# Patient Record
Sex: FEMALE | Race: BLACK OR AFRICAN AMERICAN
[De-identification: names, ages, dates, MRNs, and addresses within clinical notes are randomized per-mention and may not be internally consistent; named-entity substitution may affect disease eponyms.]

---

## 2017-01-17 ENCOUNTER — HOSPITAL ENCOUNTER (OUTPATIENT)
Dept: HOSPITAL 58 - ED | Age: 72
Setting detail: OBSERVATION
LOS: 1 days | Discharge: HOME | End: 2017-01-18
Attending: INTERNAL MEDICINE | Admitting: INTERNAL MEDICINE

## 2017-01-17 VITALS — BODY MASS INDEX: 31 KG/M2

## 2017-01-17 DIAGNOSIS — I10: ICD-10-CM

## 2017-01-17 DIAGNOSIS — I49.3: Primary | ICD-10-CM

## 2017-01-17 DIAGNOSIS — R06.02: ICD-10-CM

## 2017-01-17 DIAGNOSIS — E87.6: ICD-10-CM

## 2017-01-17 DIAGNOSIS — I42.0: ICD-10-CM

## 2017-01-17 DIAGNOSIS — J44.9: ICD-10-CM

## 2017-01-17 DIAGNOSIS — Z95.0: ICD-10-CM

## 2017-01-17 DIAGNOSIS — Z79.899: ICD-10-CM

## 2017-01-17 DIAGNOSIS — Z95.810: ICD-10-CM

## 2017-01-17 DIAGNOSIS — Z79.01: ICD-10-CM

## 2017-01-17 DIAGNOSIS — I25.10: ICD-10-CM

## 2017-01-17 DIAGNOSIS — I50.9: ICD-10-CM

## 2017-01-17 DIAGNOSIS — R06.09: ICD-10-CM

## 2017-01-17 LAB
ALBUMIN SERPL-MCNC: 4.1 G/DL (ref 3.4–5)
ALBUMIN/GLOB SERPL: 1.05 {RATIO}
ALP SERPL-CCNC: 62 U/L (ref 53–141)
ALT SERPL-CCNC: 19 U/L (ref 12–78)
ANION GAP SERPL CALC-SCNC: 16.4 MMOL/L
AST SERPL-CCNC: 25 U/L (ref 15–37)
BASOPHILS # BLD AUTO: 0 K/UL (ref 0–0.2)
BASOPHILS NFR BLD AUTO: 0.3 % (ref 0–3)
BILIRUB SERPL-MCNC: 1.01 MG/DL (ref 0–1.2)
BUN SERPL-MCNC: 16 MG/DL (ref 7–18)
BUN/CREAT SERPL: 17.97
CALCIUM SERPL-MCNC: 9.5 MG/DL (ref 8.2–10.2)
CHLORIDE SERPL-SCNC: 103 MMOL/L (ref 98–107)
CK SERPL-CCNC: 60 U/L
CO2 BLD-SCNC: 26 MMOL/L (ref 23–31)
CREAT SERPL-MCNC: 0.89 MG/DL (ref 0.6–1.3)
EOSINOPHIL # BLD AUTO: 0.1 K/UL (ref 0–0.7)
EOSINOPHIL NFR BLD AUTO: 0.7 % (ref 0–7)
GFR SERPLBLD BASED ON 1.73 SQ M-ARVRAT: 76 ML/MIN
GLOBULIN SER CALC-MCNC: 3.9 G/L
GLUCOSE SERPL-MCNC: 105 MG/DL (ref 82–115)
HCT VFR BLD AUTO: 35.9 % (ref 37–47)
HGB BLD-MCNC: 12.2 G/DL (ref 12–16)
IMM GRANULOCYTES NFR BLD AUTO: 0.3 % (ref 0–5)
LYMPHOCYTES # SPEC AUTO: 1 K/UL (ref 0.6–3.4)
LYMPHOCYTES NFR BLD AUTO: 9.9 % (ref 10–50)
MAGNESIUM SERPL-MCNC: 2.2 MG/DL (ref 1.7–2.2)
MCH RBC QN: 28.2 PG (ref 27–31)
MCHC RBC AUTO-ENTMCNC: 34 G/DL (ref 31.8–35.4)
MCV RBC: 82.9 FL (ref 81–99)
MONOCYTES # BLD AUTO: 0.4 K/UL (ref 0.4–2)
MONOCYTES NFR BLD AUTO: 3.9 % (ref 0–10)
NEUTROPHILS # BLD AUTO: 8.6 K/UL (ref 2–6.9)
NEUTROPHILS NFR BLD AUTO: 84.9 %
PLATELET # BLD AUTO: 259 10^3/UL (ref 140–440)
POTASSIUM SERPL-SCNC: 3.4 MMOL/L (ref 3.5–5.1)
PROT SERPL-MCNC: 8 G/DL (ref 5.8–8.1)
RBC # BLD AUTO: 4.33 10^6/UL (ref 4.2–5.4)
SODIUM SERPL-SCNC: 142 MMOL/L (ref 136–145)
TROPONIN I SERPL-MCNC: 0.07 NG/ML (ref 0–0.4)
WBC # BLD AUTO: 10.13 K/UL (ref 4.6–10.2)

## 2017-01-17 PROCEDURE — 96361 HYDRATE IV INFUSION ADD-ON: CPT

## 2017-01-17 PROCEDURE — 99284 EMERGENCY DEPT VISIT MOD MDM: CPT

## 2017-01-17 PROCEDURE — 93010 ELECTROCARDIOGRAM REPORT: CPT

## 2017-01-17 PROCEDURE — 36415 COLL VENOUS BLD VENIPUNCTURE: CPT

## 2017-01-17 PROCEDURE — 84484 ASSAY OF TROPONIN QUANT: CPT

## 2017-01-17 PROCEDURE — 80053 COMPREHEN METABOLIC PANEL: CPT

## 2017-01-17 PROCEDURE — 97802 MEDICAL NUTRITION INDIV IN: CPT

## 2017-01-17 PROCEDURE — 96372 THER/PROPH/DIAG INJ SC/IM: CPT

## 2017-01-17 PROCEDURE — 83880 ASSAY OF NATRIURETIC PEPTIDE: CPT

## 2017-01-17 PROCEDURE — 80048 BASIC METABOLIC PNL TOTAL CA: CPT

## 2017-01-17 PROCEDURE — 83735 ASSAY OF MAGNESIUM: CPT

## 2017-01-17 PROCEDURE — 82550 ASSAY OF CK (CPK): CPT

## 2017-01-17 PROCEDURE — 93005 ELECTROCARDIOGRAM TRACING: CPT

## 2017-01-17 PROCEDURE — 84132 ASSAY OF SERUM POTASSIUM: CPT

## 2017-01-17 PROCEDURE — 85025 COMPLETE CBC W/AUTO DIFF WBC: CPT

## 2017-01-17 PROCEDURE — 85379 FIBRIN DEGRADATION QUANT: CPT

## 2017-01-17 NOTE — ED.PDOC
General


ED Provider: 


Dr. ZENIA VOGT





Chief Complaint: Respiratory Complaint


Stated Complaint: Patient is a 71 year old femal who has a Pacemaker/ 

difibrilaltor who states that she  having some wheezing 3 days ago with some 

nausea and diaphoresis. She also  received a call from [place] today and was 

told her defibrilator fired on Saturday at 4 in the morning. when she was 

having these symptoms.  Pt stated she feels SOA when she walks, but no other 

symptoms.


Time Seen by Physician: 19:06


Mode of Arrival: Walk-In


Information Source: Patient


Exam Limitations: No limitations


Primary Care Provider: 


MARY WANG





Nursing and Triage Documentation Reviewed and Agree: Yes





Cardiovascular Complaint Exam





- Chest Pain Complaint/Exam


Onset: Sudden


Duration: 1 hour 


Symptoms Are: Resolved


Timing: Intermittent


Associated Signs and Symptoms: Reports: Diaphoresis, Nausea, Short of air (with 

waking occationally ).  Denies: Vomiting, Fever, Palpitations, Cough


Related Surgical History: Reports: Pacemaker


AMI/ACS Risk Factors: Reports: Hypertension


TAD Risk Factors: Reports: Hypertension


Prior Care for this Complaint: No


Recent Stress Test: No


Recent Echo/LV Function: No


JVD Present: No


Subcutaneous Emphysema Present: No


Diminshed Breath Sounds: No


Reproducible Chest Wall Pain: No


Bilateral Pulses Present: No


Unequal Pulses Noted: No


If Risk Factors for AMI/ACS Consider: EKG, Cardiac Enzymes


Differential Diagnoses: Other


Quality Indicator For Non-Traumatic Chest Pain/Syncope: EKG Performed





Review of Systems





- Review Of Systems


Constitutional: Reports: No symptoms


Eyes: Reports: No symptoms


Ears, Nose, Mouth, Throat: Reports: No symptoms


Respiratory: Reports: Short of air, Wheezing


Cardiac: Reports: No symptoms


GI: Reports: Nausea (3 days ago )


: Reports: No symptoms


Musculoskeletal: Reports: No symptoms


Skin: Reports: No symptoms


Neurological: Reports: No symptoms


Endocrine: Reports: No symptoms, Other (diaphroresis on 3 days ago )


Hematologic/Lymphatic: Reports: No symptoms


All Other Systems: Reviewed and Negative





Past Medical History





- Past Medical History


Endocrine: Reports: None


Cardiovascular: Reports: Hypertension


Respiratory: Reports: None


Hematological: Reports: Anemia


Gastrointestinal: Reports: GERD


Genitourinary: Reports: None


Neuro/Psych: Reports: TIA


Musculoskeletal: Reports: Arthritis


Cancer: Reports: None


Last Menstrual Period: N/A





- Surgical History


General Surgical History: Reports: Pacemaker (defibrilltor. )





- Family History


Family History: Reports: Heart





- Social History


Smoking Status: Former smoker


Hx Substance Use: No


Alcohol Screening: None





- Immunizations


Tetanus Shot up to Date: Yes





Physical Exam





- Physical Exam


Appearance: Well-appearing, No pain distress, Well-nourished


Eyes: KAYLA, EOMI, Conjunctiva clear


ENT: Oropharynx normal


Neck: Supple


Respiratory: Airway patent, Breath sounds clear, Breath sounds equal, 

Respirations nonlabored


Cardiovascular: RRR, Pulses normal, No rub, No murmur


GI/: Soft, Nontender, No masses, Bowel sounds normal, No Organomegaly


Musculoskeletal: Normal strength, ROM intact, No edema, No calf tenderness


Skin: Warm, Dry, Normal color


Neurological: Sensation intact, Motor intact, Reflexes intact, Cranial nerves 

intact, Alert, Oriented


Psychiatric: Affect appropriate, Mood appropriate





Interpretation





- Cardiac Monitor


Time of Cardiac Monitor Interpretation: 19:00


Ectopy: PVCs





- EKG Interpretation


Time of EKG #1: 19:16


Rate: Normal


Rhythm: Sinus


Ectopy: PVCs


Axis: Left


Interpretation: Multiple PVCS. 





Physician Notification





- Case Discussed


Physician Notified: Ana Rosa


Time of Notification: 21:15 (Ok to admits for observation. )





Critical Care Note





- Critical Care Note


Total Time (mins): 15





Course





- Course


Hematology/Chemistry: 


 01/17/17 19:14





 01/17/17 19:14


Orders, Labs, Meds: 


Lab Review











  01/17/17 01/17/17





  19:14 19:17


 


WBC  10.13 


 


RBC  4.33 


 


Hgb  12.2 


 


Hct  35.9 L 


 


MCV  82.9 


 


MCH  28.2 


 


MCHC  34.0 


 


RDW Coeff of Nori  15.6 H 


 


Plt Count  259 


 


Immature Gran % (Auto)  0.3 


 


Neut % (Auto)  84.9 


 


Lymph % (Auto)  9.9 L 


 


Mono % (Auto)  3.9 


 


Eos % (Auto)  0.7 


 


Baso % (Auto)  0.3 


 


Immature Gran # (Auto)  0.0 


 


Neut #  8.6 H 


 


Lymph #  1.0 


 


Mono #  0.4 


 


Eos #  0.1 


 


Baso #  0.0 


 


D-Dimer   0.72


 


Sodium  142 


 


Potassium  3.4 L 


 


Chloride  103 


 


Carbon Dioxide  26 


 


Anion Gap  16.4 


 


BUN  16 


 


Creatinine  0.89 


 


Estimated GFR (MDRD)  76.00 


 


BUN/Creatinine Ratio  17.97 


 


Glucose  105 


 


Calcium  9.5 


 


Magnesium  2.2 


 


Total Bilirubin  1.01 


 


AST  25 


 


ALT  19 


 


Alkaline Phosphatase  62 


 


Total Creatine Kinase  60 


 


Troponin I  0.0740 


 


B-Natriuretic Peptide  1452 H 


 


Total Protein  8.0 


 


Albumin  4.1 


 


Globulin  3.9 


 


Albumin/Globulin Ratio  1.05 








Orders











 Category Date Time Status


 


 PLACE PATIENT AS OBSERVATION .TO MEDSURG (MONITORED BED ADMISSION  01/17/17 21:

21 Active





 )   


 


 EKG-(ED ONLY) Stat CARDIO  01/17/17 19:06 Completed


 


 EKG-(IP & OP ONLY) Routine CARDIO  01/18/17 06:00 Ordered


 


 ACTIVITY .Up ad Abbie CARE  01/17/17 21:23 Active


 


 INTAKE & OUTPUT Q8HR CARE  01/17/17 21:22 Active


 


 TELEMETRY MONITORING TELE CARE  01/17/17 21:23 Active


 


 VITAL SIGNS Q8HR CARE  01/17/17 21:23 Active


 


 CARDIAC DIET DIETARY  01/17/17 Breakfast Ordered


 


 BASIC METABOLIC PANEL DAILY@0600 LAB  01/18/17 06:00 Ordered


 


 BASIC METABOLIC PANEL DAILY@0600 LAB  01/19/17 06:00 Ordered


 


 BASIC METABOLIC PANEL DAILY@0600 LAB  01/20/17 06:00 Ordered


 


 BASIC METABOLIC PANEL DAILY@0600 LAB  01/21/17 06:00 Ordered


 


 BASIC METABOLIC PANEL DAILY@0600 LAB  01/22/17 06:00 Ordered


 


 BASIC METABOLIC PANEL DAILY@0600 LAB  01/23/17 06:00 Ordered


 


 BASIC METABOLIC PANEL DAILY@0600 LAB  01/24/17 06:00 Ordered


 


 BASIC METABOLIC PANEL DAILY@0600 LAB  01/25/17 06:00 Ordered


 


 BASIC METABOLIC PANEL DAILY@0600 LAB  01/26/17 06:00 Ordered


 


 BASIC METABOLIC PANEL DAILY@0600 LAB  01/27/17 06:00 Ordered


 


 BASIC METABOLIC PANEL DAILY@0600 LAB  01/28/17 06:00 Ordered


 


 BASIC METABOLIC PANEL DAILY@0600 LAB  01/29/17 06:00 Ordered


 


 BASIC METABOLIC PANEL DAILY@0600 LAB  01/30/17 06:00 Ordered


 


 BASIC METABOLIC PANEL DAILY@0600 LAB  01/31/17 06:00 Ordered


 


 BASIC METABOLIC PANEL DAILY@0600 LAB  02/01/17 06:00 Ordered


 


 BASIC METABOLIC PANEL DAILY@0600 LAB  02/02/17 06:00 Ordered


 


 BASIC METABOLIC PANEL DAILY@0600 LAB  02/03/17 06:00 Ordered


 


 BASIC METABOLIC PANEL DAILY@0600 LAB  02/04/17 06:00 Ordered


 


 BASIC METABOLIC PANEL DAILY@0600 LAB  02/05/17 06:00 Ordered


 


 BASIC METABOLIC PANEL DAILY@0600 LAB  02/06/17 06:00 Ordered


 


 BNP [B-TYPE NATRIURETIC PEPTIDE] Stat LAB  01/17/17 19:14 Completed


 


 CBC W/ AUTO DIFF Stat LAB  01/17/17 19:14 Completed


 


 COMPREHENSIVE METABOLIC PANEL Stat LAB  01/17/17 19:14 Completed


 


 CREATINE KINASE Stat LAB  01/17/17 19:14 Completed


 


 D-DIMER Stat LAB  01/17/17 19:17 Completed


 


 MAGNESIUM Stat LAB  01/17/17 19:14 Completed


 


 TROPONIN I Stat LAB  01/17/17 19:14 Completed


 


 Albuterol Sulfate [Proair Hfa] MEDS  01/17/17 21:26 Ordered





 2 puff IH Q4-6H PRN   


 


 Clopidogrel Bisulfate [Plavix] MEDS  01/18/17 09:00 Ordered





 75 mg PO DAILY   


 


 Enoxaparin Sodium [Lovenox] MEDS  01/18/17 09:00 Ordered





 40 mg SUBCUT DAILY   


 


 Furosemide [Lasix Tab] MEDS  01/18/17 09:00 Ordered





 20 mg PO BID   


 


 L.acidoph,Paracasei, B.lactis [Probiotic] MEDS  01/18/17 09:00 Ordered





 1 cap PO DAILY   


 


 Metolazone [Zaroxolyn] MEDS  01/17/17 21:26 Ordered





 2.5 mg PO DAILY PRN   


 


 Multivits-Min/Iron/FA/Lutein [Centrum Silver Women MEDS  01/18/17 09:00 Ordered





 Tablet]   





 1 tab PO DAILY   


 


 Omega-3 Fatty Acids/Fish Oil [Fish Oil 1,000 mg Capsule MEDS  01/18/17 09:00 

Ordered





 ]   





 1 each PO DAILY   


 


 Ondansetron HCl/Pf [Zofran 4 mg/2 ml] MEDS  01/17/17 21:21 Ordered





 4 mg IVP Q6H PRN   


 


 Potassium Chloride [Potassium Chloride Premix Run] 20 MEDS  01/17/17 21:25 

Active





 meq   





 Premix 100 ml Water 1 bag   





 IV ONCE   


 


 Potassium Chloride [Potassium Chloride] MEDS  01/18/17 09:00 Ordered





 20 meq PO DAILY   


 


 Pravastatin Sodium [Pravachol] MEDS  01/18/17 09:00 Ordered





 20 mg PO DAILY   


 


 Prednisone MEDS  01/19/17 09:00 Ordered





 2.5 mg PO EVERY OTHER DAY   


 


 Sotalol HCl [Betapace] MEDS  01/18/17 09:00 Ordered





 40 mg PO BID   


 


 RESUSCITATION STATUS Routine OTHERS  01/17/17 21:21 Ordered


 


 CHEST, 2 VIEWS PA & LAT Stat RADS  01/17/17 20:11 Taken








Medications











Generic Name Dose Route Start Last Admin





  Trade Name Freq  PRN Reason Stop Dose Admin


 


Albuterol Sulfate  2 puff  01/17/17 21:26  





  Proair Hfa  IH   





  Q4-6H PRN   





  Shortness of breath    


 


Clopidogrel Bisulfate  75 mg  01/18/17 09:00  





  Plavix  PO   





  DAILY [facility]   


 


Enoxaparin Sodium  40 mg  01/18/17 09:00  





  Lovenox  SUBCUT   





  DAILY ANNIE   


 


Furosemide  20 mg  01/18/17 09:00  





  Lasix Tab  PO   





  BID ANNIE   


 


Potassium Chloride 20 meq/  100 mls @ 50 mls/hr  01/17/17 21:25  





  Sterile Water  IV  01/17/17 23:24  





  ONCE STA   


 


Metolazone  2.5 mg  01/17/17 21:26  





  Zaroxolyn  PO   





  DAILY PRN   





  Edema   


 


Non-Formulary Medication  1 cap  01/18/17 09:00  





  L.Acidoph,Paracasei, B.Lactis [Probiotic]  PO   





  DAILY ANNIE   


 


Non-Formulary Medication  1 tab  01/18/17 09:00  





  Multivits-Min/Iron/Fa/Lutein [Centrum Silver Women Tablet]  PO   





  DAILY ANNIE   


 


Non-Formulary Medication  1 each  01/18/17 09:00  





  Omega-3 Fatty Acids/Fish Oil [Fish Oil 1,000 Mg Capsule]  PO   





  DAILY ANNIE   


 


Non-Formulary Medication  20 meq  01/18/17 09:00  





  Potassium Chloride [Potassium Chloride]  PO   





  DAILY ANNIE   


 


Ondansetron HCl  4 mg  01/17/17 21:21  





  Zofran 4 Mg/2 Ml  IVP   





  Q6H PRN   





  Nausea / Vomiting   


 


Pravastatin Sodium  20 mg  01/18/17 09:00  





  Pravachol  PO   





  DAILY ANNIE   


 


Prednisone  2.5 mg  01/19/17 09:00  





  Prednisone  PO   





  EVERY OTHER DAY ANNIE   


 


Sotalol HCl  40 mg  01/18/17 09:00  





  Betapace  PO   





  BID [facility]   











Vital Signs: 


 











  Temp Pulse Resp BP Pulse Ox


 


 01/17/17 18:31  97.4 F L  81  20  116/71  94 L














GENNY Risk Score


GENNY Risk Score: 


Risk Score      Odds of death by 30D


      0                 0.1 (0.1-0.2)


      1                 0.3 (0.2-0.3)


      2                 0.4 (0.3-0.5)


      3                 0.7 (0.6-0.9)


      4                 1.2 (1.0-1.5)


      5                 2.2 (1.9-2.6)


      6                 3.0 (2.5-3.6)


      7                 4.8 (3.8-6.1)








Departure





- Departure


Time of Disposition: 21:31


Disposition: PLACED AS OBSERVATION


Discharge Problem: 


 Hypokalemia





Dyspnea


Qualifiers:


 Dyspnea type: dyspnea on exertion Qualifier Code: (R06.09) Other forms of 

dyspnea





Condition: Fair


Pt referred to PMD for follow-up: No (Admitted )


Allergies/Adverse Reactions: 


Allergies





aspirin Adverse Reaction (Verified 10/11/16 00:34)


 


cholecalciferol (vitamin D3) [From Vitamin D3] Adverse Reaction (Verified 10/11/

16 00:34)


 


ciprofloxacin Adverse Reaction (Verified 10/11/16 00:34)


 


codeine Adverse Reaction (Verified 10/11/16 00:34)


 


dexamethasone [From Decadron] Adverse Reaction (Verified 10/11/16 00:34)


 


dexamethasone sod phosphate [From Decadron] Adverse Reaction (Verified 10/11/16 

00:34)


 


ergocalciferol (vitamin D2) [From Vitamin D2] Adverse Reaction (Verified 10/11/

16 00:34)


 constipation


fluticasone propionate [From Advair Diskus] Adverse Reaction (Verified 10/11/16 

00:34)


 


hydrochlorothiazide Adverse Reaction (Verified 10/11/16 00:34)


 angioedema


salmeterol xinafoate [From Advair Diskus] Adverse Reaction (Verified 10/11/16 00

:34)


 


spironolactone Adverse Reaction (Verified 10/11/16 00:34)


 


Sulfa (Sulfonamide Antibiotics) Adverse Reaction (Verified 10/11/16 00:34)


 








Home Medications: 


Ambulatory Orders





Clopidogrel Bisulfate [Plavix] 75 mg PO DAILY 09/01/15 


Furosemide [Lasix] 20 mg PO BID 09/01/15 


Omega-3 Fatty Acids/Fish Oil [Fish Oil 1,000 mg Capsule] 1 each PO DAILY 09/01/

15 


Pravastatin Sodium [Pravachol] 20 mg PO DAILY 09/01/15 


Sotalol HCl [Sotalol] 40 mg PO BID 09/01/15 


Metolazone 2.5 mg PO DAILY PRN 10/11/16 


Prednisone 2.5 mg PO EVERY OTHER DAY 10/11/16 


Albuterol Sulfate [Proair Hfa] 2 puff INH Q4-6H PRN 01/17/17 


L.acidoph,Paracasei, B.lactis [Probiotic] 1 cap PO DAILY 01/17/17 


Multivits-Min/Iron/FA/Lutein [Centrum Silver Women Tablet] 1 tab PO DAILY 01/17/ 17 


Potassium Chloride 10 meq PO DAILY 01/17/17 








Disposition Discussed With: Patient, Family

## 2017-01-18 VITALS — SYSTOLIC BLOOD PRESSURE: 110 MMHG | DIASTOLIC BLOOD PRESSURE: 65 MMHG | TEMPERATURE: 98.3 F

## 2017-01-18 LAB
ANION GAP SERPL CALC-SCNC: 12.2 MMOL/L
BUN SERPL-MCNC: 17 MG/DL (ref 7–18)
BUN/CREAT SERPL: 17.89
CALCIUM SERPL-MCNC: 9 MG/DL (ref 8.2–10.2)
CHLORIDE SERPL-SCNC: 103 MMOL/L (ref 98–107)
CO2 BLD-SCNC: 29 MMOL/L (ref 23–31)
CREAT SERPL-MCNC: 0.95 MG/DL (ref 0.6–1.3)
GFR SERPLBLD BASED ON 1.73 SQ M-ARVRAT: 70 ML/MIN
GLUCOSE SERPL-MCNC: 108 MG/DL (ref 82–115)
POTASSIUM SERPL-SCNC: 3.2 MMOL/L (ref 3.5–5.1)
SODIUM SERPL-SCNC: 141 MMOL/L (ref 136–145)

## 2017-01-18 RX ADMIN — Medication SCH SYR: at 13:38

## 2017-01-18 RX ADMIN — Medication SCH SYR: at 04:40

## 2017-01-18 NOTE — DI
EXAM:  Chest, two views, 01/17/2017 

  

HISTORY:  Shortness of a air 

  

COMPARISON:  10/10/2016 

  

FINDINGS / IMPRESSION:  Cardiomediastinal contours appear enlarged.  Mediastinal contours appear sim
ilar to the prior study. 

  

Left-sided pacer device is in place. 

  

There is no focal pulmonary consolidation.  No pleural effusion or pneumothorax.

## 2017-01-18 NOTE — PCM.PROG
Attending Provider: 


ATTENDING PROVIDER:


Dr. BRENT WHEELER





DATE OF SERVICE:  01/18/17





SUBJECTIVE: 


The patient was admitted with palpitations. The patient has not had any 

palpitations since admission.  She states she gets minimal shortness of breath 

with exertion but this is normal for her.  The patient has no chest pain. No PND

, no orthopnea.  





REVIEW OF SYSTEMS:


CONSTITUTIONAL: No fever, no chills.


ENDOCRINE: No weight loss or weight gain.


HEENT: No sinus drainage, no sore throat.


CVS: No angina symptoms. No CHF symptoms.  No palpitations.  No atypical chest 

pain for CAD.  Shortness of breath on minimal exertion. No PND, no orthopnea. 


RESPIRATORY: No cough, no hemoptysis.


GI: No melena.  No abdominal pain.  No nausea, no vomiting.


: No hematuria.  No polyuria.


SKIN: No rash.  No wounds.


MUSCULOSKELETAL: No pain.


CNS: No blackout, no dizziness.  No headache.  No double vision.


PSYCHIATRIC: Not anxious; no depression.  No suicidal thoughts.  No homicidal 

thoughts.





PHYSICAL EXAMINATION:


GENERAL:  Sitting in bed in no distress.


VITAL SIGNS:  Temperature 97.7 F, Pulse 87, Respiratory Rate 16, /79, 

Pulse Ox 97%


HEENT:  Normocephalic, atraumatic.  Mucosa is dry, pallor positive.


NECK:  No JVP, no carotid bruit.  No lymphadenopathy.


CARDIAC:  S1, S2, no S3.  No murmur, gallop or regurgitation.


LUNGS:  Clear to auscultation.


ABDOMEN:  Soft, non-tender.  Bowel sounds active.  No rigidity, guarding or CVA 

tenderness.


EXTREMITIES:  No clubbing, cyanosis or edema.  


NEUROLOGIC: Awake, alert and oriented x3.


LYMPHATIC: No palpable lymph nodes


SKIN: Not dry.  Intact.


MUSCULOSKELETAL: No joint swelling.








LAB REVIEW:





 01/18/17 04:00 











01/18/17 04:00: Sodium 141, Potassium 3.2 L, Chloride 103, Carbon Dioxide 29, 

Anion Gap 12.2, BUN 17, Creatinine 0.95, Estimated GFR (MDRD) 70.00, BUN/

Creatinine Ratio 17.89, Glucose 108, Calcium 9.0





ASSESSMENT: 


1.  Palpitations 


2.  Hypokalemia


3.  CHF, stable


4.  AICD


5.  Hypertension


6.  Dyslipidemia





PLAN:


1.  Holter monitor


2.  Repeat potassium 60 mg p.o. and 20 mg IV by 2 p.m. one time dose (patient 

refuses the IV potassium) so will give 20 mg p.o.





Plan and coordination of the patient's care discussed in the presence of Case 

Manager and nurse.





CONDITION:  Stable











SCRIBED BY:


FANY SMITH  scribed while in presence of service performed by 

Dr. BRENT WHEELER on 01/18/17 (0805)

## 2017-01-19 ENCOUNTER — HOSPITAL ENCOUNTER (OUTPATIENT)
Dept: HOSPITAL 58 - CAR | Age: 72
Discharge: HOME | End: 2017-01-19
Attending: INTERNAL MEDICINE
Payer: COMMERCIAL

## 2017-01-19 VITALS — BODY MASS INDEX: 31 KG/M2

## 2017-01-19 DIAGNOSIS — R06.02: ICD-10-CM

## 2017-01-19 DIAGNOSIS — R00.2: Primary | ICD-10-CM

## 2017-01-20 NOTE — HOLTER
PATIENT INFORMATION AND COMMENTS



Indications:  PALPITATIONS, SOB



________________________________________________________________________________
__

Patient Medications:  PROAIR, PLAVIX, LOVENOX, ZAROXOLYN, K-DUR, PRAVACHOL, 
PREDNISONE, BETAPACE



________________________________________________________________________________
__

Pre-procedure Summary: 



Protocol:  Standard      Heart Rate         

Started: 1/18/17 0939       Minimum: 48/BPM   Weight: 181 LBS   

Ended: 1/19/17 0827      Maximum: 138/BPM   Height: 64"

Duration: 22 HRS 48 MIN      Average:  82/BPM

________________________________________________________________________________
_

INTERPRETATIONS/OBSERVATIONS:

1. BASIC RHYTHM: SINUS, RATE 50 /MINUTE, AVERAGE 80/MINUTE

2. FREQUENT PVC'S AND INFREQUENT PAC'S--3 TO 6 BEAT TACHYCARDIA NOTED

3. NO ST-T WAVE CHANGES FROM BASELINE

4. NO CORRELATION WITH ACTIVITY LOG

5. NO SUSTAINED TACHYCARDIA NOTED





MTDD

## 2017-01-20 NOTE — PN
DATE OF SERVICE:  01/18/17 AND 01/19/17 - SEEN BY DR. WANG



SUBJECTIVE:  

The patient was seen yesterday in the hospital 1/18/17. The patient was seen 
today during the time I performed her 2D 'M' Mode echo. 



DISCUSSION:

The patient had not been feeling good. She had some sweaty spells with 
shortness of breath, weakness. According to [facility] in [place], 
the patient was shocked and likely had V-fib the morning of Sunday. The patient 
was called and she was asked to be on Amiodarone. While she was in the hospital 
I saw her. She flatly refused to be on Amiodarone; under no circumstances she 
is going to use Amiodarone. She has read up on Amiodarone with side effects. I 
tried to convince her that a small dose of Amiodarone should be tried while she 
is in the hospital. I also explained to her that what she had was near death, 
which is V-fib and need to prevent this from happening even though the 
defibrillator may protect her. She is very intelligent. Two daughters were 
present in the room. The patient flatly refused. She was oriented to time, 
place and person. She doesn't have any symptoms of CHF. Otherwise, her 
cardiovascular status was stable while she was in the hospital. She wanted to 
go home and in fact, she made a statement that she is going home no matter 
what. She is feeling good so she was discharged by Dr. Oseguera.



This morning I saw the patient on 1/19/17, for 2D 'M' mode echo. The patient's 
ejection fraction is 25%. She was noted to have a few PVCs. Again, the patient 
was explained about the finding that she has dilated cardiomyopathy which makes 
her prone to arrhythmias and sudden death(V-fib is likely cause of death).  The 
patient had holter monitor put on which was removed and I have not read it yet. 
The patient was again advised to get on the medication which could help 
supraventricular tachyarrhythmias or V-tach, V-fib. The patient understands but 
she again declined. The daughter was present. She needs to be followed as an 
outpatient. The patient has declined to go to [facility] for 
followup. I strongly advised her to go to [facility] and discussed 
with them the same medication conditions she has a long with Amiodarone that 
they want her to be started on. The patient declined tht she is not going to go 
to [facility] and she doesn't want to discuss anything about any 
medication, that she is feeling fine. 



At the present time, she is up and about, has no symptoms of CHF. 



CONDITION: STABLE  



TIME SPENT:  More than 30 minutes. 



Plan and coordination of the patient's care discussed in the presence of nurse. 
YONATAN

## 2017-01-20 NOTE — ECHO2D
Date of Exam: 1/19/17    Ordering Physician: MARY WANG

Reason for Echo: PALPITATIONS, SOB, ETIOLOGY OF CARDIOMEGALY, CHF







M-Mode  Normal Adult Results LV Dimensions Normal Adult Results

 

AoV Opening excursions       >1.6  >1.6 LVEDD-base-     3.5-5.8  6.8

 

Ao root dimensions      2.0-3.7  2.5 LVESD-base-     3.1-4.6  

 

L. Atrium dimensions      1.9-3.8  4.6 Post. Wall thickness     0.8-1.1  1.2

 

IV septum (thickness)      0.7-1.2  1.2 Post. Wall excursion     0.72-1.3  0.4

 

Septal motion   0.4 Systolic motion   

 

R. Ventricular cavity     1.5-2.0  NORMAL LVEF       60%  28%

 

Paradoxical septal wall motion   NORMAL    



2-D :HYPOKINETIC LEFT VENTRICLE, NORMAL VALVES--ENLARGED LEFT VENTRICLE AND 
LEFT ATRIAL CAVITIES, TRACE TO MILD EFFUSION, NO THROMBUS





M-MODE:

MV:  NORMAL

AV:  NORMAL

TV:  NORMAL

PV:  

CHAMBER SIZE:  ENLARGED LEFT ATRIAL AND LEFT VENTRICLE CAVITIES

WALL MOTION:  HYPOKINETIC LEFT VENTRICLE

PERICARDIUM:  TRACE TO MILD PERICARDIAL EFFUSION



INTERPRETATION:  

1. BORDERLINE LEFT VENTRICULAR HYPERTROPHY WITH ENLARGED LET ATRIAL CAVITY

2. HYPOKINETIC LEFT VENTRICLE WITH LEFT VENTRICULAR EJECTION FRACTION 28%

3. TRACE TO MILD PERICARDIAL EFFUSION

4. ENLARGED LEFT ATRIAL CAVITY

MTDD

## 2017-01-20 NOTE — PN
DATE OF SERVICE:  01/17/17



CHIEF COMPLAINT:

Palpitations



SUBJECTIVE: 

The patient is a 71 year old female with history of dilated cardiomyopathy and 
AICD placement. The patient was having cough, congestion and shortness of 
breath but the patient felt like she had a shock for AICD and started having 
some palpitations so the patient got worried and came to the emergency room. 
The patient was seen by Dr. Savage and the evaluation showed the patient was 
mildly hypokalemic 3.4, BNP 1452 and her EKG showed lots of PVC's. Given her 
age and the multiple medical problems the patient was admitted for the 
observation for the palpitation and the hypokalemia. 



REVIEW OF SYSTEMS:  

CONSTITUTIONAL: No fever, no chills. Weakness and tiredness.  

HEENT:  Normal.

ENDOCRINE: No weight gain, no weight loss.

CVS:  No angina symptoms. No CHF symptoms. Palpitations.  No atypical chest 
pain for CAD.  Shortness of breath. No PND, no orthopnea. 

RESPIRATORY:  No cough, no hemoptysis. 

GI:  No nausea, no vomiting.  No abdominal pain. 

:  No hematuria. No polyuria. 

MUSCULOSKELETAL:.  No joint swelling. 

PSYCHIATRIC: Not anxious. No depression. No suicidal thoughts. No homicidal 
thoughts. 

SKIN: Intact. No rash. 



PHYSICAL EXAMINATION:  

V/S: Blood pressure 116/71, respiratory 20, heart rate 85 and saturation 95%.

HEENT: Normocephalic, atraumatic. Ears, eyes, nose and throat normal. Mucosa 
Dry. 

NECK:  Supple.  No JVD, no carotid bruit. No lymphadenopathy.

LUNGS: Decreased and clear to auscultation. No rales or rhonchi. 

HEART:  S1, S2 normal. No S3. No murmur, gallop or regurgitation. 

ABDOMEN: Soft, nontender. Bowel sounds active. No rigidity. No rebound or 
guarding. No CVA tenderness. 

EXTREMITIES: No clubbing, cyanosis or pedal edema. 

MUSCULOSKELETAL:  No joint swelling. 

NEUROLOGIC:  Awake, alert, oriented times three.  No focal deficit. 

LYMPHATIC: No lymph nodes palpable. 

SKIN: Intact.



LABS:

WBC 10.13, hgb 12.2, hct 35.9, plt count 259, D-dimer 0.72, sodium 142, 
potassium 3.4, chloride 103, bicarb 26, BUN 16, creatinine 0.89 and BNP 1452. 



ASSESSMENT: 

1.  Palpitations 

2.  Hypokalemia

3.  Acute on chronic CHF, ejection fraction 25 to 30

4.  AICD replacement 

5.  Coronary Artery Disease

6.  Congestive heart failure

7.  COPD

8.  Osteoarthritis

9.  Depression



PLAN:

1.  Admit patient to the observation

2.  20 IV potassium 

3.  Continue the home medications

4.  Lovenox for the DVT prophylaxis



Will follow the patient in daily rounds.  



TIME SPENT: More than 30 minutes
MTDD

## 2017-01-26 NOTE — SSS
DATE OF SERVICE: 01/18/17



REASON FOR CONSULTATION/ADMISSION: 

Hypokalemia and Palpitations 



HISTORY OF PRESENT ILLNESS:  

Three days ago with shortness of air, wheezing, nausea and diaphoresis. Call on 
01/16/17 from [place] that defibrillator  "fired" at 0400. When having 
symptoms. Shortness of air with activity. 



REVIEW OF SYSTEMS:

CONSTITUTIONAL:  No night sweats.  No fatigue, malaise, lethargy.  No fever or 
chills.

HEENT:  Eyes:  No visual changes.  No eye pain.  No eye discharge.  ENT:  No 
runny nose.  No epistaxis.  No sinus pain.  No sore throat.  No odynophagia.  
No ear pain.  No congestion.

RESPIRATORY:  No cough, no congestion.  No hemoptysis.  

CARDIOVASCULAR:  No angina symptoms. No CHF symptoms.  No atypical chest pain 
for CAD.  No palpitations. Shortness of breath with occasional wheezing.  

GASTROINTESTINAL:  No abdominal pain.  No nausea or diaphoresis currently, 
three days ago.  No diarrhea or constipation.  No hematemesis.  No 
hematochezia.  

GENITOURINARY:  No urgency.  No frequency.  No dysuria.  No hematuria.  No 
obstructive symptoms.  No discharge.  No pain.  No significant abnormal 
bleeding.

MUSCULOSKELETAL:  No musculoskeletal pain.  No joint swelling.  

NEUROLOGICAL:  Awake, alert, oriented to time, place and person. No headache. 
No neck pain. No syncope. No seizures.  No dizziness. 

PSYCHIATRIC:   Not anxious. No depression.  No suicidal thoughts. No homicidal 
thoughts. 

SKIN:  No rash.  No lesions.  No wounds.

ENDOCRINE:  No unexplained weight loss.  No weight gain. 

HEMATOLOGIC/LYMPHATIC:  No anemia.  No purpura.  No petechiae.  No prolonged or 
excessive bleeding.  No palpable lymph nodes. 



PAST HISTORY:

AICD/Pacemaker

Hypertension

Dyslipidemia

Congestive heart failure

COPD

Asthma

GERD

Osteoarthritis

Anemia 



PERSONAL/FAMILY HISTORY/SOCIAL HISTORY:

Previous smoker (stopped 45 years ago) No alcohol.  and lives alone. 
Independent with ADL's. Family history of Cancer. 



PHYSICAL EXAMINATION: 

GENERAL:  The patient is an  [place] female, age 71. Height 64in and 
weight 181 with BMI 31.1. Independent with ADL's.  No BME

VITAL SIGNS:  Temperature 98.1, pulse 87, respiratory 20, oxygen saturation 95% 
on room air and blood pressure 118/64.

HEENT:  Head normocephalic, atraumatic.  Eyes: Extraocular muscles are intact.  
Pupils are equal, round and reactive to light and accommodation.  Ears:  No 
lesions.  Nose appeared normal. Throat: No exudate or erythema.

NECK: Supple. No JVD, no carotid bruit.  No lymphadenopathy or thyromegaly. 

LUNGS: Bilaterally equal and Clear to auscultation.  Percussion note normal.  
Chest  symmetrical. 

HEART:  S1, S2, no S3. No murmurs.  No cyanosis or clubbing.  No ascites.  
Pulses: Dorsalis pedis and posterior tibial pulses +1 to +2 both sides. 

ABDOMEN:  Soft.  Nontender.  Bowel sounds active. No CVA tenderness. No mass 
felt. 

EXTREMITIES:  No edema.  Full range of motion of all extremities, equal. 

NEUROLOGIC:  No focal deficit. Cranial nerves II through XII are grossly 
intact.  No headache, no double vision or headache. Awake, alert and oriented 
times three. No motor deficit. 

SKIN: Not dry.  Intact.  Turgor - normal. 

LYMPHATIC:  No palpable lymph nodes/no lymphedema. 

MUSCULOSKELETAL:  Normal joints with no swelling. Muscle tone is normal.



Old/present records reviewed: Yes  Office records reviewed: Yes



EDUCATION CARRIED OUT:

No salt diet

Medication side effects. 



ALLERGIES: 

Aspirin

Cholecalciferol

Ciprofloxacin

Codeine

Dexamethasone

Dexamethasone Sod Phosphate

Ergocalciferol

Fluticasone propionate

Salmeterol Xinafoate

Spironolactone

Sulfa



MEDICATIONS:

Plavix

Lasix

Omega 3

Pravastatin

Sotalol

Metolazone

Prednisone

ProAir

Probiotic

MVI

KCL



LABS/EKG'S/X-RAY/ECHO/ABG:

WBC 10.13,Potassium 3.4, BNP 1452, EKG sinus with PVC's and Chest x-ray no 
focal consolidation. 



PROGRESS NOTES:  In chart. 



Case Discussed with Attending Physician: Yes

Case Discussed with Family: Yes



DIAGNOSES:

1.  Palpitations

2.  Hypokalemia

3.  Dilated Cardiomyopathy

4.  Ejection fraction 25%

5.  AICD/Pacemaker

6.  Dyslipidemia 

7.  COPD

8.  GERD



RECOMMENDATIONS/PLAN:

1.  Discharge Home

2.  Return 01/19/17 for Holter Monitor removal and echocardiogram

3.  Increase Potassium to 20mg daily 

4.  Continue all other medications

5.  Appointment 01/25/17 at 11am with Dr. Moser



TIME SPENT: More than 70 minutes. 
[facility]

## 2017-03-06 ENCOUNTER — HOSPITAL ENCOUNTER (OUTPATIENT)
Dept: HOSPITAL 58 - RAD | Age: 72
Discharge: HOME | End: 2017-03-06
Attending: INTERNAL MEDICINE

## 2017-03-06 VITALS — BODY MASS INDEX: 31 KG/M2

## 2017-03-06 DIAGNOSIS — J44.9: Primary | ICD-10-CM

## 2017-03-06 DIAGNOSIS — I50.9: ICD-10-CM

## 2017-03-06 DIAGNOSIS — J45.909: ICD-10-CM

## 2017-03-07 NOTE — DI
EXAM:  CHEST FRONTAL AND LATERAL VIEWS 

  

HISTORY:  Chronic obstructive pulmonary disease, shortness of breath. 

COMPARISON:  01/17/2017 

  

FINDINGS:  Stable cardiomegaly.  Pacemaker unit unchanged.  There is at least mild aortic atheroscle
rosis. No acute infiltrates are seen.  There is no consolidation, visible pleural fluid or pneumotho
rax. Bones reveal no acute fracture. 

  

IMPRESSION:   Cardiomegaly.  No acute cardiopulmonary process.

## 2018-01-01 ENCOUNTER — READMISSION MANAGEMENT (OUTPATIENT)
Dept: CALL CENTER | Facility: HOSPITAL | Age: 73
End: 2018-01-01

## 2018-01-01 ENCOUNTER — APPOINTMENT (OUTPATIENT)
Dept: GENERAL RADIOLOGY | Facility: HOSPITAL | Age: 73
End: 2018-01-01

## 2018-01-01 ENCOUNTER — HOSPITAL ENCOUNTER (INPATIENT)
Facility: HOSPITAL | Age: 73
LOS: 2 days | Discharge: HOME | End: 2018-07-28
Attending: SPECIALIST | Admitting: INTERNAL MEDICINE

## 2018-01-01 ENCOUNTER — HOSPITAL ENCOUNTER (OUTPATIENT)
Dept: GENERAL RADIOLOGY | Facility: HOSPITAL | Age: 73
Discharge: HOME | End: 2018-07-19
Admitting: SPECIALIST

## 2018-01-01 ENCOUNTER — OFFICE VISIT (OUTPATIENT)
Dept: CARDIOLOGY | Facility: CLINIC | Age: 73
End: 2018-01-01

## 2018-01-01 ENCOUNTER — TELEPHONE (OUTPATIENT)
Dept: CARDIOLOGY | Facility: CLINIC | Age: 73
End: 2018-01-01

## 2018-01-01 ENCOUNTER — ANESTHESIA EVENT (OUTPATIENT)
Dept: PERIOP | Facility: HOSPITAL | Age: 73
End: 2018-01-01

## 2018-01-01 ENCOUNTER — NURSE TRIAGE (OUTPATIENT)
Dept: CALL CENTER | Facility: HOSPITAL | Age: 73
End: 2018-01-01

## 2018-01-01 ENCOUNTER — ANESTHESIA (OUTPATIENT)
Dept: PERIOP | Facility: HOSPITAL | Age: 73
End: 2018-01-01

## 2018-01-01 ENCOUNTER — HOSPITAL ENCOUNTER (EMERGENCY)
Facility: HOSPITAL | Age: 73
Discharge: HOME | End: 2018-07-17
Attending: EMERGENCY MEDICINE | Admitting: EMERGENCY MEDICINE

## 2018-01-01 ENCOUNTER — APPOINTMENT (OUTPATIENT)
Dept: ULTRASOUND IMAGING | Facility: HOSPITAL | Age: 73
End: 2018-01-01

## 2018-01-01 ENCOUNTER — LAB (OUTPATIENT)
Dept: LAB | Facility: HOSPITAL | Age: 73
End: 2018-01-01
Attending: INTERNAL MEDICINE

## 2018-01-01 ENCOUNTER — APPOINTMENT (OUTPATIENT)
Dept: CT IMAGING | Facility: HOSPITAL | Age: 73
End: 2018-01-01

## 2018-01-01 ENCOUNTER — OFFICE VISIT (OUTPATIENT)
Dept: VASCULAR SURGERY | Facility: CLINIC | Age: 73
End: 2018-01-01

## 2018-01-01 ENCOUNTER — HOSPITAL ENCOUNTER (INPATIENT)
Facility: HOSPITAL | Age: 73
LOS: 1 days | Discharge: HOME | End: 2018-11-22
Attending: EMERGENCY MEDICINE | Admitting: INTERNAL MEDICINE

## 2018-01-01 ENCOUNTER — HOSPITAL ENCOUNTER (EMERGENCY)
Facility: HOSPITAL | Age: 73
Discharge: HOME | End: 2018-07-09
Attending: EMERGENCY MEDICINE | Admitting: EMERGENCY MEDICINE

## 2018-01-01 ENCOUNTER — APPOINTMENT (OUTPATIENT)
Dept: PREADMISSION TESTING | Facility: HOSPITAL | Age: 73
End: 2018-01-01

## 2018-01-01 ENCOUNTER — APPOINTMENT (OUTPATIENT)
Dept: INTERVENTIONAL RADIOLOGY/VASCULAR | Facility: HOSPITAL | Age: 73
End: 2018-01-01

## 2018-01-01 ENCOUNTER — HOSPITAL ENCOUNTER (EMERGENCY)
Facility: HOSPITAL | Age: 73
Discharge: HOME | End: 2018-09-08
Attending: EMERGENCY MEDICINE | Admitting: EMERGENCY MEDICINE

## 2018-01-01 ENCOUNTER — HOSPITAL ENCOUNTER (INPATIENT)
Facility: HOSPITAL | Age: 73
LOS: 11 days | Discharge: HOME | End: 2018-09-21
Attending: EMERGENCY MEDICINE | Admitting: FAMILY MEDICINE

## 2018-01-01 ENCOUNTER — HOSPITAL ENCOUNTER (INPATIENT)
Facility: HOSPITAL | Age: 73
LOS: 11 days | Discharge: HOME HEALTH | End: 2018-05-04
Attending: SPECIALIST | Admitting: FAMILY MEDICINE

## 2018-01-01 ENCOUNTER — HOSPITAL ENCOUNTER (EMERGENCY)
Facility: HOSPITAL | Age: 73
Discharge: HOME | End: 2018-12-02
Attending: EMERGENCY MEDICINE | Admitting: EMERGENCY MEDICINE

## 2018-01-01 ENCOUNTER — APPOINTMENT (OUTPATIENT)
Dept: CARDIOLOGY | Facility: HOSPITAL | Age: 73
End: 2018-01-01

## 2018-01-01 ENCOUNTER — HOSPITAL ENCOUNTER (INPATIENT)
Facility: HOSPITAL | Age: 73
LOS: 2 days | Discharge: HOME | End: 2018-06-28
Attending: EMERGENCY MEDICINE | Admitting: FAMILY MEDICINE

## 2018-01-01 VITALS
HEART RATE: 84 BPM | HEIGHT: 64 IN | DIASTOLIC BLOOD PRESSURE: 49 MMHG | BODY MASS INDEX: 30.56 KG/M2 | OXYGEN SATURATION: 96 % | WEIGHT: 179 LBS | SYSTOLIC BLOOD PRESSURE: 80 MMHG

## 2018-01-01 VITALS
OXYGEN SATURATION: 96 % | HEIGHT: 63 IN | TEMPERATURE: 97.5 F | HEART RATE: 98 BPM | RESPIRATION RATE: 18 BRPM | WEIGHT: 194 LBS | BODY MASS INDEX: 34.38 KG/M2 | SYSTOLIC BLOOD PRESSURE: 108 MMHG | DIASTOLIC BLOOD PRESSURE: 60 MMHG

## 2018-01-01 VITALS
RESPIRATION RATE: 20 BRPM | DIASTOLIC BLOOD PRESSURE: 62 MMHG | HEART RATE: 79 BPM | HEIGHT: 64 IN | SYSTOLIC BLOOD PRESSURE: 112 MMHG | OXYGEN SATURATION: 98 % | TEMPERATURE: 98.1 F | BODY MASS INDEX: 32.31 KG/M2 | WEIGHT: 189.25 LBS

## 2018-01-01 VITALS
TEMPERATURE: 97.7 F | HEART RATE: 73 BPM | DIASTOLIC BLOOD PRESSURE: 71 MMHG | OXYGEN SATURATION: 97 % | BODY MASS INDEX: 31.21 KG/M2 | RESPIRATION RATE: 20 BRPM | HEIGHT: 63 IN | SYSTOLIC BLOOD PRESSURE: 103 MMHG | WEIGHT: 176.13 LBS

## 2018-01-01 VITALS
OXYGEN SATURATION: 98 % | WEIGHT: 191 LBS | BODY MASS INDEX: 32.61 KG/M2 | SYSTOLIC BLOOD PRESSURE: 111 MMHG | DIASTOLIC BLOOD PRESSURE: 81 MMHG | RESPIRATION RATE: 29 BRPM | TEMPERATURE: 98.2 F | HEIGHT: 64 IN | HEART RATE: 89 BPM

## 2018-01-01 VITALS
OXYGEN SATURATION: 94 % | HEART RATE: 76 BPM | TEMPERATURE: 97 F | SYSTOLIC BLOOD PRESSURE: 116 MMHG | RESPIRATION RATE: 17 BRPM | DIASTOLIC BLOOD PRESSURE: 78 MMHG | HEIGHT: 63 IN | WEIGHT: 169.8 LBS | BODY MASS INDEX: 30.09 KG/M2

## 2018-01-01 VITALS
OXYGEN SATURATION: 94 % | TEMPERATURE: 97.9 F | SYSTOLIC BLOOD PRESSURE: 103 MMHG | HEART RATE: 88 BPM | HEIGHT: 64 IN | RESPIRATION RATE: 18 BRPM | DIASTOLIC BLOOD PRESSURE: 62 MMHG

## 2018-01-01 VITALS
TEMPERATURE: 97.8 F | HEIGHT: 64 IN | HEART RATE: 72 BPM | RESPIRATION RATE: 17 BRPM | WEIGHT: 183.2 LBS | OXYGEN SATURATION: 95 % | DIASTOLIC BLOOD PRESSURE: 56 MMHG | SYSTOLIC BLOOD PRESSURE: 99 MMHG | BODY MASS INDEX: 31.28 KG/M2

## 2018-01-01 VITALS
HEIGHT: 63 IN | SYSTOLIC BLOOD PRESSURE: 84 MMHG | HEART RATE: 79 BPM | DIASTOLIC BLOOD PRESSURE: 60 MMHG | BODY MASS INDEX: 30.26 KG/M2 | WEIGHT: 170.8 LBS

## 2018-01-01 VITALS
SYSTOLIC BLOOD PRESSURE: 124 MMHG | DIASTOLIC BLOOD PRESSURE: 68 MMHG | HEIGHT: 64 IN | BODY MASS INDEX: 31.07 KG/M2 | WEIGHT: 182 LBS | HEART RATE: 97 BPM

## 2018-01-01 VITALS
HEIGHT: 64 IN | HEART RATE: 95 BPM | DIASTOLIC BLOOD PRESSURE: 65 MMHG | BODY MASS INDEX: 30.94 KG/M2 | WEIGHT: 181.22 LBS | RESPIRATION RATE: 20 BRPM | OXYGEN SATURATION: 98 % | SYSTOLIC BLOOD PRESSURE: 115 MMHG

## 2018-01-01 VITALS
WEIGHT: 185 LBS | SYSTOLIC BLOOD PRESSURE: 130 MMHG | HEIGHT: 64 IN | RESPIRATION RATE: 18 BRPM | TEMPERATURE: 98.2 F | DIASTOLIC BLOOD PRESSURE: 78 MMHG | HEART RATE: 84 BPM | BODY MASS INDEX: 31.58 KG/M2 | OXYGEN SATURATION: 96 %

## 2018-01-01 VITALS
HEART RATE: 85 BPM | WEIGHT: 179 LBS | BODY MASS INDEX: 30.56 KG/M2 | HEIGHT: 64 IN | SYSTOLIC BLOOD PRESSURE: 110 MMHG | DIASTOLIC BLOOD PRESSURE: 58 MMHG

## 2018-01-01 VITALS
TEMPERATURE: 97.5 F | DIASTOLIC BLOOD PRESSURE: 55 MMHG | HEART RATE: 78 BPM | SYSTOLIC BLOOD PRESSURE: 123 MMHG | RESPIRATION RATE: 20 BRPM | HEIGHT: 64 IN | WEIGHT: 189.25 LBS | OXYGEN SATURATION: 95 % | BODY MASS INDEX: 32.31 KG/M2

## 2018-01-01 VITALS
WEIGHT: 169 LBS | DIASTOLIC BLOOD PRESSURE: 48 MMHG | BODY MASS INDEX: 29.95 KG/M2 | SYSTOLIC BLOOD PRESSURE: 81 MMHG | OXYGEN SATURATION: 98 % | HEIGHT: 63 IN | HEART RATE: 81 BPM

## 2018-01-01 VITALS — BODY MASS INDEX: 31.41 KG/M2 | WEIGHT: 183 LBS

## 2018-01-01 VITALS
HEIGHT: 63 IN | WEIGHT: 169 LBS | OXYGEN SATURATION: 98 % | DIASTOLIC BLOOD PRESSURE: 64 MMHG | BODY MASS INDEX: 29.95 KG/M2 | HEART RATE: 85 BPM | SYSTOLIC BLOOD PRESSURE: 102 MMHG

## 2018-01-01 DIAGNOSIS — N17.9 ACUTE KIDNEY INJURY (HCC): ICD-10-CM

## 2018-01-01 DIAGNOSIS — I47.29 NSVT (NONSUSTAINED VENTRICULAR TACHYCARDIA) (HCC): ICD-10-CM

## 2018-01-01 DIAGNOSIS — K80.20 CALCULUS OF GALLBLADDER WITHOUT CHOLECYSTITIS WITHOUT OBSTRUCTION: ICD-10-CM

## 2018-01-01 DIAGNOSIS — I49.3 VENTRICULAR ECTOPY: ICD-10-CM

## 2018-01-01 DIAGNOSIS — I15.1 HYPERTENSION SECONDARY TO OTHER RENAL DISORDERS: Primary | ICD-10-CM

## 2018-01-01 DIAGNOSIS — N39.0 ACUTE UTI: Primary | ICD-10-CM

## 2018-01-01 DIAGNOSIS — N28.89 HYPERTENSION SECONDARY TO OTHER RENAL DISORDERS: Primary | ICD-10-CM

## 2018-01-01 DIAGNOSIS — I42.0 DILATED CARDIOMYOPATHY (HCC): Chronic | ICD-10-CM

## 2018-01-01 DIAGNOSIS — N17.9 AKI (ACUTE KIDNEY INJURY) (HCC): Primary | ICD-10-CM

## 2018-01-01 DIAGNOSIS — R11.0 NAUSEA: ICD-10-CM

## 2018-01-01 DIAGNOSIS — E87.6 HYPOKALEMIA: ICD-10-CM

## 2018-01-01 DIAGNOSIS — I47.20 VENTRICULAR TACHYCARDIA (HCC): Primary | ICD-10-CM

## 2018-01-01 DIAGNOSIS — I42.0 DILATED CARDIOMYOPATHY (HCC): Primary | Chronic | ICD-10-CM

## 2018-01-01 DIAGNOSIS — R11.2 NON-INTRACTABLE VOMITING WITH NAUSEA, UNSPECIFIED VOMITING TYPE: ICD-10-CM

## 2018-01-01 DIAGNOSIS — Z99.2 CKD (CHRONIC KIDNEY DISEASE) REQUIRING CHRONIC DIALYSIS (HCC): Primary | ICD-10-CM

## 2018-01-01 DIAGNOSIS — R42 DIZZINESS: ICD-10-CM

## 2018-01-01 DIAGNOSIS — I10 ESSENTIAL HYPERTENSION: Chronic | ICD-10-CM

## 2018-01-01 DIAGNOSIS — Z74.09 IMPAIRED FUNCTIONAL MOBILITY AND ACTIVITY TOLERANCE: ICD-10-CM

## 2018-01-01 DIAGNOSIS — R74.01 TRANSAMINITIS: ICD-10-CM

## 2018-01-01 DIAGNOSIS — E78.2 MIXED HYPERLIPIDEMIA: Chronic | ICD-10-CM

## 2018-01-01 DIAGNOSIS — N39.0 ACUTE UTI (URINARY TRACT INFECTION): Primary | ICD-10-CM

## 2018-01-01 DIAGNOSIS — I47.20 VENTRICULAR TACHYCARDIA (HCC): ICD-10-CM

## 2018-01-01 DIAGNOSIS — R11.2 NAUSEA AND VOMITING, INTRACTABILITY OF VOMITING NOT SPECIFIED, UNSPECIFIED VOMITING TYPE: ICD-10-CM

## 2018-01-01 DIAGNOSIS — I42.9 CARDIOMYOPATHY, UNSPECIFIED TYPE (HCC): ICD-10-CM

## 2018-01-01 DIAGNOSIS — Z99.2 END STAGE RENAL DISEASE ON DIALYSIS (HCC): ICD-10-CM

## 2018-01-01 DIAGNOSIS — M10.9 ACUTE GOUT OF LEFT FOOT, UNSPECIFIED CAUSE: ICD-10-CM

## 2018-01-01 DIAGNOSIS — N18.6 END STAGE RENAL DISEASE ON DIALYSIS (HCC): ICD-10-CM

## 2018-01-01 DIAGNOSIS — R00.2 PALPITATIONS: Primary | ICD-10-CM

## 2018-01-01 DIAGNOSIS — K81.0 ACUTE CHOLECYSTITIS: ICD-10-CM

## 2018-01-01 DIAGNOSIS — K80.50 BILIARY COLIC: Primary | ICD-10-CM

## 2018-01-01 DIAGNOSIS — N18.6 CKD (CHRONIC KIDNEY DISEASE) REQUIRING CHRONIC DIALYSIS (HCC): Primary | ICD-10-CM

## 2018-01-01 DIAGNOSIS — I47.29 NON-SUSTAINED VENTRICULAR TACHYCARDIA (HCC): ICD-10-CM

## 2018-01-01 DIAGNOSIS — R52 PAIN: ICD-10-CM

## 2018-01-01 DIAGNOSIS — Z45.02 DEFIBRILLATOR DISCHARGE: Primary | ICD-10-CM

## 2018-01-01 DIAGNOSIS — M10.9 ACUTE GOUT OF RIGHT FOOT, UNSPECIFIED CAUSE: ICD-10-CM

## 2018-01-01 DIAGNOSIS — Z99.2 VASCULAR DIALYSIS CATHETER IN PLACE (HCC): ICD-10-CM

## 2018-01-01 DIAGNOSIS — I50.23 ACUTE ON CHRONIC SYSTOLIC CONGESTIVE HEART FAILURE (HCC): Primary | ICD-10-CM

## 2018-01-01 DIAGNOSIS — K21.9 GASTROESOPHAGEAL REFLUX DISEASE WITHOUT ESOPHAGITIS: Chronic | ICD-10-CM

## 2018-01-01 DIAGNOSIS — E87.20 METABOLIC ACIDOSIS: ICD-10-CM

## 2018-01-01 DIAGNOSIS — Z74.09 IMPAIRED FUNCTIONAL MOBILITY AND ENDURANCE: ICD-10-CM

## 2018-01-01 LAB
25(OH)D3 SERPL-MCNC: 28.9 NG/ML (ref 30–100)
ALBUMIN SERPL-MCNC: 2.9 G/DL (ref 3.5–5)
ALBUMIN SERPL-MCNC: 3 G/DL (ref 3.5–5)
ALBUMIN SERPL-MCNC: 3 G/DL (ref 3.5–5)
ALBUMIN SERPL-MCNC: 3.1 G/DL (ref 3.5–5)
ALBUMIN SERPL-MCNC: 3.2 G/DL (ref 3.5–5)
ALBUMIN SERPL-MCNC: 3.4 G/DL (ref 3.5–5)
ALBUMIN SERPL-MCNC: 3.4 G/DL (ref 3.5–5)
ALBUMIN SERPL-MCNC: 3.5 G/DL (ref 3.5–5)
ALBUMIN SERPL-MCNC: 3.6 G/DL (ref 3.5–5)
ALBUMIN SERPL-MCNC: 3.7 G/DL (ref 3.5–5)
ALBUMIN SERPL-MCNC: 3.8 G/DL (ref 3.5–5)
ALBUMIN SERPL-MCNC: 3.8 G/DL (ref 3.5–5)
ALBUMIN SERPL-MCNC: 3.9 G/DL (ref 3.5–5)
ALBUMIN SERPL-MCNC: 3.9 G/DL (ref 3.5–5)
ALBUMIN SERPL-MCNC: 4 G/DL (ref 3.5–5)
ALBUMIN SERPL-MCNC: 4.1 G/DL (ref 3.5–5)
ALBUMIN SERPL-MCNC: 4.1 G/DL (ref 3.5–5)
ALBUMIN SERPL-MCNC: 4.2 G/DL (ref 3.5–5)
ALBUMIN/GLOB SERPL: 0.9 G/DL (ref 1.1–2.5)
ALBUMIN/GLOB SERPL: 0.9 G/DL (ref 1.1–2.5)
ALBUMIN/GLOB SERPL: 1 G/DL (ref 1.1–2.5)
ALBUMIN/GLOB SERPL: 1.1 G/DL (ref 1.1–2.5)
ALBUMIN/GLOB SERPL: 1.2 G/DL (ref 1.1–2.5)
ALBUMIN/GLOB SERPL: 1.3 G/DL (ref 1.1–2.5)
ALBUMIN/GLOB SERPL: 1.4 G/DL (ref 1.1–2.5)
ALP SERPL-CCNC: 114 U/L (ref 24–120)
ALP SERPL-CCNC: 122 U/L (ref 24–120)
ALP SERPL-CCNC: 133 U/L (ref 24–120)
ALP SERPL-CCNC: 133 U/L (ref 24–120)
ALP SERPL-CCNC: 136 U/L (ref 24–120)
ALP SERPL-CCNC: 138 U/L (ref 24–120)
ALP SERPL-CCNC: 151 U/L (ref 24–120)
ALP SERPL-CCNC: 153 U/L (ref 24–120)
ALP SERPL-CCNC: 154 U/L (ref 24–120)
ALP SERPL-CCNC: 158 U/L (ref 24–120)
ALP SERPL-CCNC: 158 U/L (ref 24–120)
ALP SERPL-CCNC: 165 U/L (ref 24–120)
ALP SERPL-CCNC: 167 U/L (ref 24–120)
ALP SERPL-CCNC: 174 U/L (ref 24–120)
ALP SERPL-CCNC: 175 U/L (ref 24–120)
ALP SERPL-CCNC: 195 U/L (ref 24–120)
ALP SERPL-CCNC: 195 U/L (ref 24–120)
ALP SERPL-CCNC: 196 U/L (ref 24–120)
ALP SERPL-CCNC: 199 U/L (ref 24–120)
ALP SERPL-CCNC: 207 U/L (ref 24–120)
ALP SERPL-CCNC: 219 U/L (ref 24–120)
ALP SERPL-CCNC: 222 U/L (ref 24–120)
ALP SERPL-CCNC: 236 U/L (ref 24–120)
ALP SERPL-CCNC: 262 U/L (ref 24–120)
ALP SERPL-CCNC: 266 U/L (ref 24–120)
ALP SERPL-CCNC: 270 U/L (ref 24–120)
ALP SERPL-CCNC: 285 U/L (ref 24–120)
ALP SERPL-CCNC: 323 U/L (ref 24–120)
ALP SERPL-CCNC: 400 U/L (ref 24–120)
ALP SERPL-CCNC: 411 U/L (ref 24–120)
ALP SERPL-CCNC: 413 U/L (ref 24–120)
ALP SERPL-CCNC: 433 U/L (ref 24–120)
ALP SERPL-CCNC: 489 U/L (ref 24–120)
ALP SERPL-CCNC: 54 U/L (ref 24–120)
ALT SERPL W P-5'-P-CCNC: 105 U/L (ref 0–54)
ALT SERPL W P-5'-P-CCNC: 109 U/L (ref 0–54)
ALT SERPL W P-5'-P-CCNC: 109 U/L (ref 0–54)
ALT SERPL W P-5'-P-CCNC: 119 U/L (ref 0–54)
ALT SERPL W P-5'-P-CCNC: 20 U/L (ref 0–54)
ALT SERPL W P-5'-P-CCNC: 26 U/L (ref 0–54)
ALT SERPL W P-5'-P-CCNC: 27 U/L (ref 0–54)
ALT SERPL W P-5'-P-CCNC: 28 U/L (ref 0–54)
ALT SERPL W P-5'-P-CCNC: 30 U/L (ref 0–54)
ALT SERPL W P-5'-P-CCNC: 33 U/L (ref 0–54)
ALT SERPL W P-5'-P-CCNC: 37 U/L (ref 0–54)
ALT SERPL W P-5'-P-CCNC: 38 U/L (ref 0–54)
ALT SERPL W P-5'-P-CCNC: 39 U/L (ref 0–54)
ALT SERPL W P-5'-P-CCNC: 40 U/L (ref 0–54)
ALT SERPL W P-5'-P-CCNC: 43 U/L (ref 0–54)
ALT SERPL W P-5'-P-CCNC: 44 U/L (ref 0–54)
ALT SERPL W P-5'-P-CCNC: 44 U/L (ref 0–54)
ALT SERPL W P-5'-P-CCNC: 45 U/L (ref 0–54)
ALT SERPL W P-5'-P-CCNC: 47 U/L (ref 0–54)
ALT SERPL W P-5'-P-CCNC: 48 U/L (ref 0–54)
ALT SERPL W P-5'-P-CCNC: 48 U/L (ref 0–54)
ALT SERPL W P-5'-P-CCNC: 50 U/L (ref 0–54)
ALT SERPL W P-5'-P-CCNC: 53 U/L (ref 0–54)
ALT SERPL W P-5'-P-CCNC: 53 U/L (ref 0–54)
ALT SERPL W P-5'-P-CCNC: 55 U/L (ref 0–54)
ALT SERPL W P-5'-P-CCNC: 59 U/L (ref 0–54)
ALT SERPL W P-5'-P-CCNC: 62 U/L (ref 0–54)
ALT SERPL W P-5'-P-CCNC: 63 U/L (ref 0–54)
ALT SERPL W P-5'-P-CCNC: 65 U/L (ref 0–54)
ALT SERPL W P-5'-P-CCNC: 78 U/L (ref 0–54)
ALT SERPL W P-5'-P-CCNC: 85 U/L (ref 0–54)
ALT SERPL W P-5'-P-CCNC: 96 U/L (ref 0–54)
AMYLASE SERPL-CCNC: 35 U/L (ref 30–110)
AMYLASE SERPL-CCNC: 39 U/L (ref 30–110)
AMYLASE SERPL-CCNC: 39 U/L (ref 30–110)
AMYLASE SERPL-CCNC: 46 U/L (ref 30–110)
AMYLASE SERPL-CCNC: 49 U/L (ref 30–110)
AMYLASE SERPL-CCNC: 51 U/L (ref 30–110)
AMYLASE SERPL-CCNC: 55 U/L (ref 30–110)
ANION GAP SERPL CALCULATED.3IONS-SCNC: 10 MMOL/L (ref 4–13)
ANION GAP SERPL CALCULATED.3IONS-SCNC: 11 MMOL/L (ref 4–13)
ANION GAP SERPL CALCULATED.3IONS-SCNC: 12 MMOL/L (ref 4–13)
ANION GAP SERPL CALCULATED.3IONS-SCNC: 12 MMOL/L (ref 4–13)
ANION GAP SERPL CALCULATED.3IONS-SCNC: 13 MMOL/L (ref 4–13)
ANION GAP SERPL CALCULATED.3IONS-SCNC: 14 MMOL/L (ref 4–13)
ANION GAP SERPL CALCULATED.3IONS-SCNC: 15 MMOL/L (ref 4–13)
ANION GAP SERPL CALCULATED.3IONS-SCNC: 16 MMOL/L (ref 4–13)
ANION GAP SERPL CALCULATED.3IONS-SCNC: 17 MMOL/L (ref 4–13)
ANION GAP SERPL CALCULATED.3IONS-SCNC: 20 MMOL/L (ref 4–13)
ANION GAP SERPL CALCULATED.3IONS-SCNC: 8 MMOL/L (ref 4–13)
ANION GAP SERPL CALCULATED.3IONS-SCNC: 9 MMOL/L (ref 4–13)
ANION GAP SERPL CALCULATED.3IONS-SCNC: 9 MMOL/L (ref 4–13)
ANISOCYTOSIS BLD QL: ABNORMAL
APTT PPP: 30.6 SECONDS (ref 24.1–34.8)
APTT PPP: 32 SECONDS (ref 24.1–34.8)
ARTERIAL PATENCY WRIST A: POSITIVE
AST SERPL-CCNC: 114 U/L (ref 7–45)
AST SERPL-CCNC: 120 U/L (ref 7–45)
AST SERPL-CCNC: 127 U/L (ref 7–45)
AST SERPL-CCNC: 130 U/L (ref 7–45)
AST SERPL-CCNC: 142 U/L (ref 7–45)
AST SERPL-CCNC: 152 U/L (ref 7–45)
AST SERPL-CCNC: 174 U/L (ref 7–45)
AST SERPL-CCNC: 27 U/L (ref 7–45)
AST SERPL-CCNC: 27 U/L (ref 7–45)
AST SERPL-CCNC: 32 U/L (ref 7–45)
AST SERPL-CCNC: 32 U/L (ref 7–45)
AST SERPL-CCNC: 40 U/L (ref 7–45)
AST SERPL-CCNC: 41 U/L (ref 7–45)
AST SERPL-CCNC: 43 U/L (ref 7–45)
AST SERPL-CCNC: 45 U/L (ref 7–45)
AST SERPL-CCNC: 46 U/L (ref 7–45)
AST SERPL-CCNC: 46 U/L (ref 7–45)
AST SERPL-CCNC: 49 U/L (ref 7–45)
AST SERPL-CCNC: 51 U/L (ref 7–45)
AST SERPL-CCNC: 55 U/L (ref 7–45)
AST SERPL-CCNC: 56 U/L (ref 7–45)
AST SERPL-CCNC: 58 U/L (ref 7–45)
AST SERPL-CCNC: 59 U/L (ref 7–45)
AST SERPL-CCNC: 60 U/L (ref 7–45)
AST SERPL-CCNC: 63 U/L (ref 7–45)
AST SERPL-CCNC: 63 U/L (ref 7–45)
AST SERPL-CCNC: 65 U/L (ref 7–45)
AST SERPL-CCNC: 66 U/L (ref 7–45)
AST SERPL-CCNC: 66 U/L (ref 7–45)
AST SERPL-CCNC: 67 U/L (ref 7–45)
AST SERPL-CCNC: 68 U/L (ref 7–45)
AST SERPL-CCNC: 89 U/L (ref 7–45)
AST SERPL-CCNC: 94 U/L (ref 7–45)
AST SERPL-CCNC: 94 U/L (ref 7–45)
ATMOSPHERIC PRESS: 748 MMHG
ATMOSPHERIC PRESS: 748 MMHG
ATMOSPHERIC PRESS: 749 MMHG
ATMOSPHERIC PRESS: 759 MMHG
BACTERIA SPEC AEROBE CULT: NORMAL
BACTERIA UR QL AUTO: ABNORMAL /HPF
BASE EXCESS BLDA CALC-SCNC: -2.1 MMOL/L (ref 0–2)
BASE EXCESS BLDA CALC-SCNC: 0 MMOL/L (ref 0–2)
BASE EXCESS BLDA CALC-SCNC: 1.1 MMOL/L (ref 0–2)
BASE EXCESS BLDV CALC-SCNC: -0.7 MMOL/L (ref 0–2)
BASOPHILS # BLD AUTO: 0.01 10*3/MM3 (ref 0–0.2)
BASOPHILS # BLD AUTO: 0.02 10*3/MM3 (ref 0–0.2)
BASOPHILS # BLD AUTO: 0.02 10*3/MM3 (ref 0–0.2)
BASOPHILS # BLD AUTO: 0.03 10*3/MM3 (ref 0–0.2)
BASOPHILS # BLD AUTO: 0.04 10*3/MM3 (ref 0–0.2)
BASOPHILS # BLD AUTO: 0.05 10*3/MM3 (ref 0–0.2)
BASOPHILS # BLD AUTO: 0.06 10*3/MM3 (ref 0–0.2)
BASOPHILS # BLD AUTO: 0.07 10*3/MM3 (ref 0–0.2)
BASOPHILS NFR BLD AUTO: 0.1 % (ref 0–2)
BASOPHILS NFR BLD AUTO: 0.2 % (ref 0–2)
BASOPHILS NFR BLD AUTO: 0.3 % (ref 0–2)
BASOPHILS NFR BLD AUTO: 0.4 % (ref 0–2)
BASOPHILS NFR BLD AUTO: 0.4 % (ref 0–2)
BASOPHILS NFR BLD AUTO: 0.5 % (ref 0–2)
BASOPHILS NFR BLD AUTO: 0.6 % (ref 0–2)
BASOPHILS NFR BLD AUTO: 0.8 % (ref 0–2)
BASOPHILS NFR BLD AUTO: 0.9 % (ref 0–2)
BASOPHILS NFR BLD AUTO: 1 % (ref 0–2)
BDY SITE: ABNORMAL
BH CV ECHO MEAS - AO MAX PG (FULL): 7.4 MMHG
BH CV ECHO MEAS - AO MAX PG: 9.1 MMHG
BH CV ECHO MEAS - AO MEAN PG (FULL): 3 MMHG
BH CV ECHO MEAS - AO MEAN PG: 4 MMHG
BH CV ECHO MEAS - AO ROOT AREA (BSA CORRECTED): 1.5
BH CV ECHO MEAS - AO ROOT AREA: 5.7 CM^2
BH CV ECHO MEAS - AO ROOT DIAM: 2.7 CM
BH CV ECHO MEAS - AO V2 MAX: 151 CM/SEC
BH CV ECHO MEAS - AO V2 MEAN: 91.1 CM/SEC
BH CV ECHO MEAS - AO V2 VTI: 22.3 CM
BH CV ECHO MEAS - AVA(I,A): 1.4 CM^2
BH CV ECHO MEAS - AVA(I,D): 1.4 CM^2
BH CV ECHO MEAS - AVA(V,A): 1.4 CM^2
BH CV ECHO MEAS - AVA(V,D): 1.4 CM^2
BH CV ECHO MEAS - BSA(HAYCOCK): 1.9 M^2
BH CV ECHO MEAS - BSA: 1.8 M^2
BH CV ECHO MEAS - BZI_BMI: 29.9 KILOGRAMS/M^2
BH CV ECHO MEAS - BZI_METRIC_HEIGHT: 162.6 CM
BH CV ECHO MEAS - BZI_METRIC_WEIGHT: 78.9 KG
BH CV ECHO MEAS - CONTRAST EF 4CH: 20.6 ML/M^2
BH CV ECHO MEAS - EDV(CUBED): 353.4 ML
BH CV ECHO MEAS - EDV(MOD-SP4): 194 ML
BH CV ECHO MEAS - EDV(TEICH): 261.2 ML
BH CV ECHO MEAS - EF(CUBED): 23.4 %
BH CV ECHO MEAS - EF(MOD-SP4): 20.6 %
BH CV ECHO MEAS - EF(TEICH): 18.2 %
BH CV ECHO MEAS - ESV(CUBED): 270.8 ML
BH CV ECHO MEAS - ESV(MOD-SP4): 154 ML
BH CV ECHO MEAS - ESV(TEICH): 213.7 ML
BH CV ECHO MEAS - FS: 8.5 %
BH CV ECHO MEAS - IVS/LVPW: 0.8
BH CV ECHO MEAS - IVSD: 0.88 CM
BH CV ECHO MEAS - LA DIMENSION: 4.1 CM
BH CV ECHO MEAS - LA/AO: 1.5
BH CV ECHO MEAS - LAT PEAK E' VEL: 5 CM/SEC
BH CV ECHO MEAS - LV DIASTOLIC VOL/BSA (35-75): 105.2 ML/M^2
BH CV ECHO MEAS - LV MASS(C)D: 320.6 GRAMS
BH CV ECHO MEAS - LV MASS(C)DI: 173.9 GRAMS/M^2
BH CV ECHO MEAS - LV MAX PG: 1.8 MMHG
BH CV ECHO MEAS - LV MEAN PG: 1 MMHG
BH CV ECHO MEAS - LV SYSTOLIC VOL/BSA (12-30): 83.5 ML/M^2
BH CV ECHO MEAS - LV V1 MAX: 66.5 CM/SEC
BH CV ECHO MEAS - LV V1 MEAN: 43.9 CM/SEC
BH CV ECHO MEAS - LV V1 VTI: 9.8 CM
BH CV ECHO MEAS - LVIDD: 7.1 CM
BH CV ECHO MEAS - LVIDS: 6.5 CM
BH CV ECHO MEAS - LVLD AP4: 9.3 CM
BH CV ECHO MEAS - LVLS AP4: 9.1 CM
BH CV ECHO MEAS - LVOT AREA (M): 3.1 CM^2
BH CV ECHO MEAS - LVOT AREA: 3.1 CM^2
BH CV ECHO MEAS - LVOT DIAM: 2 CM
BH CV ECHO MEAS - LVPWD: 1.1 CM
BH CV ECHO MEAS - MR MAX PG: 82.8 MMHG
BH CV ECHO MEAS - MR MAX VEL: 455 CM/SEC
BH CV ECHO MEAS - MV A MAX VEL: 67.4 CM/SEC
BH CV ECHO MEAS - MV DEC TIME: 0.17 SEC
BH CV ECHO MEAS - MV E MAX VEL: 79.5 CM/SEC
BH CV ECHO MEAS - MV E/A: 1.2
BH CV ECHO MEAS - PI END-D VEL: 179 CM/SEC
BH CV ECHO MEAS - RAP SYSTOLE: 10 MMHG
BH CV ECHO MEAS - RVSP: 46 MMHG
BH CV ECHO MEAS - SI(AO): 69.3 ML/M^2
BH CV ECHO MEAS - SI(CUBED): 44.8 ML/M^2
BH CV ECHO MEAS - SI(LVOT): 16.7 ML/M^2
BH CV ECHO MEAS - SI(MOD-SP4): 21.7 ML/M^2
BH CV ECHO MEAS - SI(TEICH): 25.8 ML/M^2
BH CV ECHO MEAS - SV(AO): 127.7 ML
BH CV ECHO MEAS - SV(CUBED): 82.6 ML
BH CV ECHO MEAS - SV(LVOT): 30.9 ML
BH CV ECHO MEAS - SV(MOD-SP4): 40 ML
BH CV ECHO MEAS - SV(TEICH): 47.5 ML
BH CV ECHO MEAS - TR MAX VEL: 301 CM/SEC
BILIRUB CONJ SERPL-MCNC: 0 MG/DL (ref 0–0.3)
BILIRUB INDIRECT SERPL-MCNC: 0.4 MG/DL (ref 0–1.1)
BILIRUB SERPL-MCNC: 0.8 MG/DL (ref 0.1–1)
BILIRUB SERPL-MCNC: 0.8 MG/DL (ref 0.1–1)
BILIRUB SERPL-MCNC: 0.9 MG/DL (ref 0.1–1)
BILIRUB SERPL-MCNC: 1 MG/DL (ref 0.1–1)
BILIRUB SERPL-MCNC: 1.1 MG/DL (ref 0.1–1)
BILIRUB SERPL-MCNC: 1.2 MG/DL (ref 0.1–1)
BILIRUB SERPL-MCNC: 1.3 MG/DL (ref 0.1–1)
BILIRUB SERPL-MCNC: 1.3 MG/DL (ref 0.1–1)
BILIRUB SERPL-MCNC: 1.4 MG/DL (ref 0.1–1)
BILIRUB SERPL-MCNC: 1.4 MG/DL (ref 0.1–1)
BILIRUB SERPL-MCNC: 1.5 MG/DL (ref 0.1–1)
BILIRUB SERPL-MCNC: 1.6 MG/DL (ref 0.1–1)
BILIRUB SERPL-MCNC: 1.7 MG/DL (ref 0.1–1)
BILIRUB SERPL-MCNC: 1.7 MG/DL (ref 0.1–1)
BILIRUB SERPL-MCNC: 1.8 MG/DL (ref 0.1–1)
BILIRUB SERPL-MCNC: 1.8 MG/DL (ref 0.1–1)
BILIRUB SERPL-MCNC: 1.9 MG/DL (ref 0.1–1)
BILIRUB SERPL-MCNC: 2.1 MG/DL (ref 0.1–1)
BILIRUB SERPL-MCNC: 2.2 MG/DL (ref 0.1–1)
BILIRUB SERPL-MCNC: 2.3 MG/DL (ref 0.1–1)
BILIRUB SERPL-MCNC: 2.6 MG/DL (ref 0.1–1)
BILIRUB SERPL-MCNC: 3 MG/DL (ref 0.1–1)
BILIRUB SERPL-MCNC: 3.6 MG/DL (ref 0.1–1)
BILIRUB SERPL-MCNC: 5.4 MG/DL (ref 0.1–1)
BILIRUB UR QL STRIP: ABNORMAL
BILIRUB UR QL STRIP: NEGATIVE
BODY TEMPERATURE: 37 C
BUN BLD-MCNC: 12 MG/DL (ref 5–21)
BUN BLD-MCNC: 18 MG/DL (ref 5–21)
BUN BLD-MCNC: 19 MG/DL (ref 5–21)
BUN BLD-MCNC: 20 MG/DL (ref 5–21)
BUN BLD-MCNC: 20 MG/DL (ref 5–21)
BUN BLD-MCNC: 21 MG/DL (ref 5–21)
BUN BLD-MCNC: 22 MG/DL (ref 5–21)
BUN BLD-MCNC: 22 MG/DL (ref 5–21)
BUN BLD-MCNC: 23 MG/DL (ref 5–21)
BUN BLD-MCNC: 25 MG/DL (ref 5–21)
BUN BLD-MCNC: 26 MG/DL (ref 5–21)
BUN BLD-MCNC: 26 MG/DL (ref 5–21)
BUN BLD-MCNC: 28 MG/DL (ref 5–21)
BUN BLD-MCNC: 29 MG/DL (ref 5–21)
BUN BLD-MCNC: 33 MG/DL (ref 5–21)
BUN BLD-MCNC: 34 MG/DL (ref 5–21)
BUN BLD-MCNC: 35 MG/DL (ref 5–21)
BUN BLD-MCNC: 36 MG/DL (ref 5–21)
BUN BLD-MCNC: 36 MG/DL (ref 5–21)
BUN BLD-MCNC: 37 MG/DL (ref 5–21)
BUN BLD-MCNC: 43 MG/DL (ref 5–21)
BUN BLD-MCNC: 47 MG/DL (ref 5–21)
BUN BLD-MCNC: 48 MG/DL (ref 5–21)
BUN BLD-MCNC: 49 MG/DL (ref 5–21)
BUN BLD-MCNC: 51 MG/DL (ref 5–21)
BUN BLD-MCNC: 52 MG/DL (ref 5–21)
BUN BLD-MCNC: 52 MG/DL (ref 5–21)
BUN BLD-MCNC: 53 MG/DL (ref 5–21)
BUN BLD-MCNC: 54 MG/DL (ref 5–21)
BUN BLD-MCNC: 59 MG/DL (ref 5–21)
BUN BLD-MCNC: 60 MG/DL (ref 5–21)
BUN BLD-MCNC: 61 MG/DL (ref 5–21)
BUN BLD-MCNC: 62 MG/DL (ref 5–21)
BUN BLD-MCNC: 63 MG/DL (ref 5–21)
BUN BLD-MCNC: 69 MG/DL (ref 5–21)
BUN BLD-MCNC: 70 MG/DL (ref 5–21)
BUN BLD-MCNC: 73 MG/DL (ref 5–21)
BUN BLD-MCNC: 75 MG/DL (ref 5–21)
BUN BLD-MCNC: 79 MG/DL (ref 5–21)
BUN BLD-MCNC: 80 MG/DL (ref 5–21)
BUN/CREAT SERPL: 10.6 (ref 7–25)
BUN/CREAT SERPL: 11.4 (ref 7–25)
BUN/CREAT SERPL: 11.4 (ref 7–25)
BUN/CREAT SERPL: 11.6 (ref 7–25)
BUN/CREAT SERPL: 12.6 (ref 7–25)
BUN/CREAT SERPL: 13.7 (ref 7–25)
BUN/CREAT SERPL: 14.6 (ref 7–25)
BUN/CREAT SERPL: 14.9 (ref 7–25)
BUN/CREAT SERPL: 14.9 (ref 7–25)
BUN/CREAT SERPL: 15 (ref 7–25)
BUN/CREAT SERPL: 15.6 (ref 7–25)
BUN/CREAT SERPL: 15.9 (ref 7–25)
BUN/CREAT SERPL: 15.9 (ref 7–25)
BUN/CREAT SERPL: 16.2 (ref 7–25)
BUN/CREAT SERPL: 16.7 (ref 7–25)
BUN/CREAT SERPL: 17.6 (ref 7–25)
BUN/CREAT SERPL: 17.6 (ref 7–25)
BUN/CREAT SERPL: 18.4 (ref 7–25)
BUN/CREAT SERPL: 24.3 (ref 7–25)
BUN/CREAT SERPL: 25.2 (ref 7–25)
BUN/CREAT SERPL: 25.4 (ref 7–25)
BUN/CREAT SERPL: 26.2 (ref 7–25)
BUN/CREAT SERPL: 26.3 (ref 7–25)
BUN/CREAT SERPL: 27 (ref 7–25)
BUN/CREAT SERPL: 28.8 (ref 7–25)
BUN/CREAT SERPL: 28.9 (ref 7–25)
BUN/CREAT SERPL: 29 (ref 7–25)
BUN/CREAT SERPL: 29.1 (ref 7–25)
BUN/CREAT SERPL: 30.2 (ref 7–25)
BUN/CREAT SERPL: 30.4 (ref 7–25)
BUN/CREAT SERPL: 30.6 (ref 7–25)
BUN/CREAT SERPL: 31.3 (ref 7–25)
BUN/CREAT SERPL: 31.5 (ref 7–25)
BUN/CREAT SERPL: 32.1 (ref 7–25)
BUN/CREAT SERPL: 33.2 (ref 7–25)
BUN/CREAT SERPL: 34.7 (ref 7–25)
BUN/CREAT SERPL: 35.9 (ref 7–25)
BUN/CREAT SERPL: 9.5 (ref 7–25)
BUN/CREAT SERPL: 9.8 (ref 7–25)
BUN/CREAT SERPL: 9.8 (ref 7–25)
C-ANCA TITR SER IF: NORMAL TITER
C2 SERPL-MCNC: 2.2 MG/DL (ref 1.6–4)
C3 FRG RBC-MCNC: ABNORMAL
C3 SERPL-MCNC: 103 MG/DL (ref 82–167)
C4 SERPL-MCNC: 20 MG/DL (ref 14–44)
CALCIUM SPEC-SCNC: 7.8 MG/DL (ref 8.4–10.4)
CALCIUM SPEC-SCNC: 7.9 MG/DL (ref 8.4–10.4)
CALCIUM SPEC-SCNC: 8 MG/DL (ref 8.4–10.4)
CALCIUM SPEC-SCNC: 8.1 MG/DL (ref 8.4–10.4)
CALCIUM SPEC-SCNC: 8.1 MG/DL (ref 8.4–10.4)
CALCIUM SPEC-SCNC: 8.2 MG/DL (ref 8.4–10.4)
CALCIUM SPEC-SCNC: 8.3 MG/DL (ref 8.4–10.4)
CALCIUM SPEC-SCNC: 8.3 MG/DL (ref 8.4–10.4)
CALCIUM SPEC-SCNC: 8.4 MG/DL (ref 8.4–10.4)
CALCIUM SPEC-SCNC: 8.5 MG/DL (ref 8.4–10.4)
CALCIUM SPEC-SCNC: 8.6 MG/DL (ref 8.4–10.4)
CALCIUM SPEC-SCNC: 8.7 MG/DL (ref 8.4–10.4)
CALCIUM SPEC-SCNC: 8.8 MG/DL (ref 8.4–10.4)
CALCIUM SPEC-SCNC: 8.9 MG/DL (ref 8.4–10.4)
CALCIUM SPEC-SCNC: 9 MG/DL (ref 8.4–10.4)
CALCIUM SPEC-SCNC: 9 MG/DL (ref 8.4–10.4)
CALCIUM SPEC-SCNC: 9.1 MG/DL (ref 8.4–10.4)
CALCIUM SPEC-SCNC: 9.2 MG/DL (ref 8.4–10.4)
CALCIUM SPEC-SCNC: 9.3 MG/DL (ref 8.4–10.4)
CALCIUM SPEC-SCNC: 9.3 MG/DL (ref 8.4–10.4)
CALCIUM SPEC-SCNC: 9.5 MG/DL (ref 8.4–10.4)
CENTROMERE B AB SER-ACNC: <0.2 AI (ref 0–0.9)
CHLORIDE SERPL-SCNC: 100 MMOL/L (ref 98–110)
CHLORIDE SERPL-SCNC: 100 MMOL/L (ref 98–110)
CHLORIDE SERPL-SCNC: 101 MMOL/L (ref 98–110)
CHLORIDE SERPL-SCNC: 102 MMOL/L (ref 98–110)
CHLORIDE SERPL-SCNC: 89 MMOL/L (ref 98–110)
CHLORIDE SERPL-SCNC: 90 MMOL/L (ref 98–110)
CHLORIDE SERPL-SCNC: 90 MMOL/L (ref 98–110)
CHLORIDE SERPL-SCNC: 91 MMOL/L (ref 98–110)
CHLORIDE SERPL-SCNC: 92 MMOL/L (ref 98–110)
CHLORIDE SERPL-SCNC: 93 MMOL/L (ref 98–110)
CHLORIDE SERPL-SCNC: 94 MMOL/L (ref 98–110)
CHLORIDE SERPL-SCNC: 95 MMOL/L (ref 98–110)
CHLORIDE SERPL-SCNC: 96 MMOL/L (ref 98–110)
CHLORIDE SERPL-SCNC: 97 MMOL/L (ref 98–110)
CHLORIDE SERPL-SCNC: 98 MMOL/L (ref 98–110)
CHLORIDE SERPL-SCNC: 98 MMOL/L (ref 98–110)
CHLORIDE SERPL-SCNC: 99 MMOL/L (ref 98–110)
CHROMATIN AB SERPL-ACNC: <0.2 AI (ref 0–0.9)
CLARITY UR: ABNORMAL
CLARITY UR: ABNORMAL
CLARITY UR: CLEAR
CLUMPED PLATELETS: PRESENT
CO2 SERPL-SCNC: 19 MMOL/L (ref 24–31)
CO2 SERPL-SCNC: 19 MMOL/L (ref 24–31)
CO2 SERPL-SCNC: 20 MMOL/L (ref 24–31)
CO2 SERPL-SCNC: 22 MMOL/L (ref 24–31)
CO2 SERPL-SCNC: 23 MMOL/L (ref 24–31)
CO2 SERPL-SCNC: 23 MMOL/L (ref 24–31)
CO2 SERPL-SCNC: 24 MMOL/L (ref 24–31)
CO2 SERPL-SCNC: 26 MMOL/L (ref 24–31)
CO2 SERPL-SCNC: 27 MMOL/L (ref 24–31)
CO2 SERPL-SCNC: 28 MMOL/L (ref 24–31)
CO2 SERPL-SCNC: 29 MMOL/L (ref 24–31)
CO2 SERPL-SCNC: 30 MMOL/L (ref 24–31)
CO2 SERPL-SCNC: 31 MMOL/L (ref 24–31)
CO2 SERPL-SCNC: 33 MMOL/L (ref 24–31)
CO2 SERPL-SCNC: 33 MMOL/L (ref 24–31)
CO2 SERPL-SCNC: 34 MMOL/L (ref 24–31)
CO2 SERPL-SCNC: 34 MMOL/L (ref 24–31)
COHGB MFR BLD: 0.8 % (ref 0–5)
COLOR UR: ABNORMAL
COLOR UR: YELLOW
CREAT BLD-MCNC: 0.73 MG/DL (ref 0.5–1.4)
CREAT BLD-MCNC: 1.05 MG/DL (ref 0.5–1.4)
CREAT BLD-MCNC: 1.22 MG/DL (ref 0.5–1.4)
CREAT BLD-MCNC: 1.28 MG/DL (ref 0.5–1.4)
CREAT BLD-MCNC: 1.31 MG/DL (ref 0.5–1.4)
CREAT BLD-MCNC: 1.31 MG/DL (ref 0.5–1.4)
CREAT BLD-MCNC: 1.33 MG/DL (ref 0.5–1.4)
CREAT BLD-MCNC: 1.37 MG/DL (ref 0.5–1.4)
CREAT BLD-MCNC: 1.43 MG/DL (ref 0.5–1.4)
CREAT BLD-MCNC: 1.48 MG/DL (ref 0.5–1.4)
CREAT BLD-MCNC: 1.58 MG/DL (ref 0.5–1.4)
CREAT BLD-MCNC: 1.58 MG/DL (ref 0.5–1.4)
CREAT BLD-MCNC: 1.6 MG/DL (ref 0.5–1.4)
CREAT BLD-MCNC: 1.64 MG/DL (ref 0.5–1.4)
CREAT BLD-MCNC: 1.76 MG/DL (ref 0.5–1.4)
CREAT BLD-MCNC: 1.79 MG/DL (ref 0.5–1.4)
CREAT BLD-MCNC: 1.83 MG/DL (ref 0.5–1.4)
CREAT BLD-MCNC: 1.9 MG/DL (ref 0.5–1.4)
CREAT BLD-MCNC: 1.92 MG/DL (ref 0.5–1.4)
CREAT BLD-MCNC: 1.93 MG/DL (ref 0.5–1.4)
CREAT BLD-MCNC: 2.09 MG/DL (ref 0.5–1.4)
CREAT BLD-MCNC: 2.11 MG/DL (ref 0.5–1.4)
CREAT BLD-MCNC: 2.14 MG/DL (ref 0.5–1.4)
CREAT BLD-MCNC: 2.19 MG/DL (ref 0.5–1.4)
CREAT BLD-MCNC: 2.25 MG/DL (ref 0.5–1.4)
CREAT BLD-MCNC: 2.29 MG/DL (ref 0.5–1.4)
CREAT BLD-MCNC: 2.41 MG/DL (ref 0.5–1.4)
CREAT BLD-MCNC: 2.42 MG/DL (ref 0.5–1.4)
CREAT BLD-MCNC: 2.46 MG/DL (ref 0.5–1.4)
CREAT BLD-MCNC: 2.48 MG/DL (ref 0.5–1.4)
CREAT BLD-MCNC: 2.52 MG/DL (ref 0.5–1.4)
CREAT BLD-MCNC: 2.66 MG/DL (ref 0.5–1.4)
CREAT BLD-MCNC: 2.69 MG/DL (ref 0.5–1.4)
CREAT BLD-MCNC: 2.73 MG/DL (ref 0.5–1.4)
CREAT BLD-MCNC: 2.75 MG/DL (ref 0.5–1.4)
CREAT BLD-MCNC: 2.95 MG/DL (ref 0.5–1.4)
CREAT BLD-MCNC: 3.05 MG/DL (ref 0.5–1.4)
CREAT BLD-MCNC: 3.34 MG/DL (ref 0.5–1.4)
CREAT BLD-MCNC: 3.72 MG/DL (ref 0.5–1.4)
CREAT BLD-MCNC: 3.96 MG/DL (ref 0.5–1.4)
CREAT UR-MCNC: 136 MG/DL
CREAT UR-MCNC: 47.9 MG/DL
CYTO UR: NORMAL
D DIMER PPP FEU-MCNC: 0.95 MG/L (FEU) (ref 0–0.5)
D-LACTATE SERPL-SCNC: 1.7 MMOL/L (ref 0.5–2)
D-LACTATE SERPL-SCNC: 1.8 MMOL/L (ref 0.5–2)
D-LACTATE SERPL-SCNC: 2 MMOL/L (ref 0.5–2)
D-LACTATE SERPL-SCNC: 2 MMOL/L (ref 0.5–2)
D-LACTATE SERPL-SCNC: 2.1 MMOL/L (ref 0.5–2)
D-LACTATE SERPL-SCNC: 2.6 MMOL/L (ref 0.5–2)
D-LACTATE SERPL-SCNC: 2.9 MMOL/L (ref 0.5–2)
D-LACTATE SERPL-SCNC: 3 MMOL/L (ref 0.5–2)
DACRYOCYTES BLD QL SMEAR: ABNORMAL
DEPRECATED RDW RBC AUTO: 48.3 FL (ref 40–54)
DEPRECATED RDW RBC AUTO: 48.6 FL (ref 40–54)
DEPRECATED RDW RBC AUTO: 49.3 FL (ref 40–54)
DEPRECATED RDW RBC AUTO: 49.5 FL (ref 40–54)
DEPRECATED RDW RBC AUTO: 49.6 FL (ref 40–54)
DEPRECATED RDW RBC AUTO: 49.8 FL (ref 40–54)
DEPRECATED RDW RBC AUTO: 50 FL (ref 40–54)
DEPRECATED RDW RBC AUTO: 50.1 FL (ref 40–54)
DEPRECATED RDW RBC AUTO: 50.3 FL (ref 40–54)
DEPRECATED RDW RBC AUTO: 50.4 FL (ref 40–54)
DEPRECATED RDW RBC AUTO: 51 FL (ref 40–54)
DEPRECATED RDW RBC AUTO: 51.2 FL (ref 40–54)
DEPRECATED RDW RBC AUTO: 51.7 FL (ref 40–54)
DEPRECATED RDW RBC AUTO: 51.8 FL (ref 40–54)
DEPRECATED RDW RBC AUTO: 52 FL (ref 40–54)
DEPRECATED RDW RBC AUTO: 52.2 FL (ref 40–54)
DEPRECATED RDW RBC AUTO: 53.4 FL (ref 40–54)
DEPRECATED RDW RBC AUTO: 53.4 FL (ref 40–54)
DEPRECATED RDW RBC AUTO: 53.6 FL (ref 40–54)
DEPRECATED RDW RBC AUTO: 54.4 FL (ref 40–54)
DEPRECATED RDW RBC AUTO: 54.4 FL (ref 40–54)
DEPRECATED RDW RBC AUTO: 54.7 FL (ref 40–54)
DEPRECATED RDW RBC AUTO: 54.7 FL (ref 40–54)
DEPRECATED RDW RBC AUTO: 56.3 FL (ref 40–54)
DEPRECATED RDW RBC AUTO: 67.3 FL (ref 40–54)
DEPRECATED RDW RBC AUTO: 70 FL (ref 40–54)
DEPRECATED RDW RBC AUTO: 71.2 FL (ref 40–54)
DSDNA AB SER-ACNC: <1 IU/ML (ref 0–9)
ENA JO1 AB SER-ACNC: <0.2 AI (ref 0–0.9)
ENA RNP AB SER-ACNC: <0.2 AI (ref 0–0.9)
ENA SCL70 AB SER-ACNC: <0.2 AI (ref 0–0.9)
ENA SM AB SER-ACNC: 0.2 AI (ref 0–0.9)
ENA SS-A AB SER-ACNC: <0.2 AI (ref 0–0.9)
ENA SS-B AB SER-ACNC: <0.2 AI (ref 0–0.9)
EOSINOPHIL # BLD AUTO: 0 10*3/MM3 (ref 0–0.7)
EOSINOPHIL # BLD AUTO: 0.01 10*3/MM3 (ref 0–0.7)
EOSINOPHIL # BLD AUTO: 0.03 10*3/MM3 (ref 0–0.7)
EOSINOPHIL # BLD AUTO: 0.04 10*3/MM3 (ref 0–0.7)
EOSINOPHIL # BLD AUTO: 0.05 10*3/MM3 (ref 0–0.7)
EOSINOPHIL # BLD AUTO: 0.05 10*3/MM3 (ref 0–0.7)
EOSINOPHIL # BLD AUTO: 0.06 10*3/MM3 (ref 0–0.7)
EOSINOPHIL # BLD AUTO: 0.06 10*3/MM3 (ref 0–0.7)
EOSINOPHIL # BLD AUTO: 0.08 10*3/MM3 (ref 0–0.7)
EOSINOPHIL # BLD AUTO: 0.09 10*3/MM3 (ref 0–0.7)
EOSINOPHIL # BLD AUTO: 0.11 10*3/MM3 (ref 0–0.7)
EOSINOPHIL NFR BLD AUTO: 0 % (ref 0–4)
EOSINOPHIL NFR BLD AUTO: 0.1 % (ref 0–4)
EOSINOPHIL NFR BLD AUTO: 0.2 % (ref 0–4)
EOSINOPHIL NFR BLD AUTO: 0.3 % (ref 0–4)
EOSINOPHIL NFR BLD AUTO: 0.3 % (ref 0–4)
EOSINOPHIL NFR BLD AUTO: 0.4 % (ref 0–4)
EOSINOPHIL NFR BLD AUTO: 0.4 % (ref 0–4)
EOSINOPHIL NFR BLD AUTO: 0.5 % (ref 0–4)
EOSINOPHIL NFR BLD AUTO: 0.6 % (ref 0–4)
EOSINOPHIL NFR BLD AUTO: 0.7 % (ref 0–4)
EOSINOPHIL NFR BLD AUTO: 0.7 % (ref 0–4)
EOSINOPHIL NFR BLD AUTO: 0.8 % (ref 0–4)
EOSINOPHIL NFR BLD AUTO: 0.8 % (ref 0–4)
EOSINOPHIL NFR BLD AUTO: 1.2 % (ref 0–4)
EOSINOPHIL NFR BLD AUTO: 1.2 % (ref 0–4)
EOSINOPHIL NFR BLD AUTO: 1.3 % (ref 0–4)
ERYTHROCYTE [DISTWIDTH] IN BLOOD BY AUTOMATED COUNT: 16.5 % (ref 12–15)
ERYTHROCYTE [DISTWIDTH] IN BLOOD BY AUTOMATED COUNT: 16.7 % (ref 12–15)
ERYTHROCYTE [DISTWIDTH] IN BLOOD BY AUTOMATED COUNT: 16.7 % (ref 12–15)
ERYTHROCYTE [DISTWIDTH] IN BLOOD BY AUTOMATED COUNT: 16.9 % (ref 12–15)
ERYTHROCYTE [DISTWIDTH] IN BLOOD BY AUTOMATED COUNT: 16.9 % (ref 12–15)
ERYTHROCYTE [DISTWIDTH] IN BLOOD BY AUTOMATED COUNT: 17.1 % (ref 12–15)
ERYTHROCYTE [DISTWIDTH] IN BLOOD BY AUTOMATED COUNT: 17.2 % (ref 12–15)
ERYTHROCYTE [DISTWIDTH] IN BLOOD BY AUTOMATED COUNT: 17.3 % (ref 12–15)
ERYTHROCYTE [DISTWIDTH] IN BLOOD BY AUTOMATED COUNT: 17.3 % (ref 12–15)
ERYTHROCYTE [DISTWIDTH] IN BLOOD BY AUTOMATED COUNT: 17.4 % (ref 12–15)
ERYTHROCYTE [DISTWIDTH] IN BLOOD BY AUTOMATED COUNT: 17.4 % (ref 12–15)
ERYTHROCYTE [DISTWIDTH] IN BLOOD BY AUTOMATED COUNT: 17.5 % (ref 12–15)
ERYTHROCYTE [DISTWIDTH] IN BLOOD BY AUTOMATED COUNT: 17.7 % (ref 12–15)
ERYTHROCYTE [DISTWIDTH] IN BLOOD BY AUTOMATED COUNT: 17.9 % (ref 12–15)
ERYTHROCYTE [DISTWIDTH] IN BLOOD BY AUTOMATED COUNT: 18.5 % (ref 12–15)
ERYTHROCYTE [DISTWIDTH] IN BLOOD BY AUTOMATED COUNT: 18.6 % (ref 12–15)
ERYTHROCYTE [DISTWIDTH] IN BLOOD BY AUTOMATED COUNT: 18.6 % (ref 12–15)
ERYTHROCYTE [DISTWIDTH] IN BLOOD BY AUTOMATED COUNT: 18.7 % (ref 12–15)
ERYTHROCYTE [DISTWIDTH] IN BLOOD BY AUTOMATED COUNT: 18.8 % (ref 12–15)
ERYTHROCYTE [DISTWIDTH] IN BLOOD BY AUTOMATED COUNT: 18.8 % (ref 12–15)
ERYTHROCYTE [DISTWIDTH] IN BLOOD BY AUTOMATED COUNT: 22.3 % (ref 12–15)
ERYTHROCYTE [DISTWIDTH] IN BLOOD BY AUTOMATED COUNT: 23 % (ref 12–15)
ERYTHROCYTE [DISTWIDTH] IN BLOOD BY AUTOMATED COUNT: 23.2 % (ref 12–15)
GAS FLOW AIRWAY: 2 LPM
GBM IGG SER-ACNC: 5 UNITS (ref 0–20)
GFR SERPL CREATININE-BSD FRML MDRD: 11 ML/MIN/1.73
GFR SERPL CREATININE-BSD FRML MDRD: 12 ML/MIN/1.73
GFR SERPL CREATININE-BSD FRML MDRD: 14 ML/MIN/1.73
GFR SERPL CREATININE-BSD FRML MDRD: 15 ML/MIN/1.73
GFR SERPL CREATININE-BSD FRML MDRD: 16 ML/MIN/1.73
GFR SERPL CREATININE-BSD FRML MDRD: 17 ML/MIN/1.73
GFR SERPL CREATININE-BSD FRML MDRD: 18 ML/MIN/1.73
GFR SERPL CREATININE-BSD FRML MDRD: 19 ML/MIN/1.73
GFR SERPL CREATININE-BSD FRML MDRD: 20 ML/MIN/1.73
GFR SERPL CREATININE-BSD FRML MDRD: 20 ML/MIN/1.73
GFR SERPL CREATININE-BSD FRML MDRD: 21 ML/MIN/1.73
GFR SERPL CREATININE-BSD FRML MDRD: 21 ML/MIN/1.73
GFR SERPL CREATININE-BSD FRML MDRD: 22 ML/MIN/1.73
GFR SERPL CREATININE-BSD FRML MDRD: 23 ML/MIN/1.73
GFR SERPL CREATININE-BSD FRML MDRD: 26 ML/MIN/1.73
GFR SERPL CREATININE-BSD FRML MDRD: 27 ML/MIN/1.73
GFR SERPL CREATININE-BSD FRML MDRD: 28 ML/MIN/1.73
GFR SERPL CREATININE-BSD FRML MDRD: 28 ML/MIN/1.73
GFR SERPL CREATININE-BSD FRML MDRD: 31 ML/MIN/1.73
GFR SERPL CREATININE-BSD FRML MDRD: 32 ML/MIN/1.73
GFR SERPL CREATININE-BSD FRML MDRD: 35 ML/MIN/1.73
GFR SERPL CREATININE-BSD FRML MDRD: 36 ML/MIN/1.73
GFR SERPL CREATININE-BSD FRML MDRD: 38 ML/MIN/1.73
GFR SERPL CREATININE-BSD FRML MDRD: 39 ML/MIN/1.73
GFR SERPL CREATININE-BSD FRML MDRD: 40 ML/MIN/1.73
GFR SERPL CREATININE-BSD FRML MDRD: 40 ML/MIN/1.73
GFR SERPL CREATININE-BSD FRML MDRD: 41 ML/MIN/1.73
GFR SERPL CREATININE-BSD FRML MDRD: 43 ML/MIN/1.73
GFR SERPL CREATININE-BSD FRML MDRD: 51 ML/MIN/1.73
GFR SERPL CREATININE-BSD FRML MDRD: 78 ML/MIN/1.73
GIANT PLATELETS: ABNORMAL
GLOBULIN UR ELPH-MCNC: 2.7 GM/DL
GLOBULIN UR ELPH-MCNC: 2.7 GM/DL
GLOBULIN UR ELPH-MCNC: 2.8 GM/DL
GLOBULIN UR ELPH-MCNC: 2.8 GM/DL
GLOBULIN UR ELPH-MCNC: 2.9 GM/DL
GLOBULIN UR ELPH-MCNC: 3 GM/DL
GLOBULIN UR ELPH-MCNC: 3.1 GM/DL
GLOBULIN UR ELPH-MCNC: 3.2 GM/DL
GLOBULIN UR ELPH-MCNC: 3.3 GM/DL
GLOBULIN UR ELPH-MCNC: 3.4 GM/DL
GLOBULIN UR ELPH-MCNC: 3.5 GM/DL
GLOBULIN UR ELPH-MCNC: 3.7 GM/DL
GLOBULIN UR ELPH-MCNC: 3.7 GM/DL
GLOBULIN UR ELPH-MCNC: 4.2 GM/DL
GLUCOSE BLD-MCNC: 102 MG/DL (ref 70–100)
GLUCOSE BLD-MCNC: 103 MG/DL (ref 70–100)
GLUCOSE BLD-MCNC: 103 MG/DL (ref 70–100)
GLUCOSE BLD-MCNC: 104 MG/DL (ref 70–100)
GLUCOSE BLD-MCNC: 104 MG/DL (ref 70–100)
GLUCOSE BLD-MCNC: 107 MG/DL (ref 70–100)
GLUCOSE BLD-MCNC: 108 MG/DL (ref 70–100)
GLUCOSE BLD-MCNC: 109 MG/DL (ref 70–100)
GLUCOSE BLD-MCNC: 110 MG/DL (ref 70–100)
GLUCOSE BLD-MCNC: 113 MG/DL (ref 70–100)
GLUCOSE BLD-MCNC: 114 MG/DL (ref 70–100)
GLUCOSE BLD-MCNC: 116 MG/DL (ref 70–100)
GLUCOSE BLD-MCNC: 118 MG/DL (ref 70–100)
GLUCOSE BLD-MCNC: 121 MG/DL (ref 70–100)
GLUCOSE BLD-MCNC: 123 MG/DL (ref 70–100)
GLUCOSE BLD-MCNC: 124 MG/DL (ref 70–100)
GLUCOSE BLD-MCNC: 125 MG/DL (ref 70–100)
GLUCOSE BLD-MCNC: 130 MG/DL (ref 70–100)
GLUCOSE BLD-MCNC: 131 MG/DL (ref 70–100)
GLUCOSE BLD-MCNC: 140 MG/DL (ref 70–100)
GLUCOSE BLD-MCNC: 141 MG/DL (ref 70–100)
GLUCOSE BLD-MCNC: 144 MG/DL (ref 70–100)
GLUCOSE BLD-MCNC: 145 MG/DL (ref 70–100)
GLUCOSE BLD-MCNC: 145 MG/DL (ref 70–100)
GLUCOSE BLD-MCNC: 152 MG/DL (ref 70–100)
GLUCOSE BLD-MCNC: 153 MG/DL (ref 70–100)
GLUCOSE BLD-MCNC: 88 MG/DL (ref 70–100)
GLUCOSE BLD-MCNC: 89 MG/DL (ref 70–100)
GLUCOSE BLD-MCNC: 91 MG/DL (ref 70–100)
GLUCOSE BLD-MCNC: 93 MG/DL (ref 70–100)
GLUCOSE BLD-MCNC: 93 MG/DL (ref 70–100)
GLUCOSE BLD-MCNC: 94 MG/DL (ref 70–100)
GLUCOSE BLD-MCNC: 97 MG/DL (ref 70–100)
GLUCOSE BLD-MCNC: 99 MG/DL (ref 70–100)
GLUCOSE BLD-MCNC: 99 MG/DL (ref 70–100)
GLUCOSE BLDC GLUCOMTR-MCNC: 137 MG/DL (ref 70–130)
GLUCOSE BLDC GLUCOMTR-MCNC: 144 MG/DL (ref 70–130)
GLUCOSE UR STRIP-MCNC: NEGATIVE MG/DL
HAV IGM SERPL QL IA: NEGATIVE
HBV CORE IGM SERPL QL IA: NEGATIVE
HBV E AG SERPL QL IA: NEGATIVE
HBV SURFACE AB SER QL: <5
HBV SURFACE AB SER RIA-ACNC: ABNORMAL
HBV SURFACE AG SERPL QL IA: NEGATIVE
HCO3 BLDA-SCNC: 20.8 MMOL/L (ref 20–26)
HCO3 BLDA-SCNC: 22.5 MMOL/L (ref 20–26)
HCO3 BLDA-SCNC: 25.8 MMOL/L (ref 20–26)
HCO3 BLDV-SCNC: 27 MMOL/L (ref 22–28)
HCT VFR BLD AUTO: 28.2 % (ref 37–47)
HCT VFR BLD AUTO: 29.1 % (ref 37–47)
HCT VFR BLD AUTO: 29.4 % (ref 37–47)
HCT VFR BLD AUTO: 29.7 % (ref 37–47)
HCT VFR BLD AUTO: 29.8 % (ref 37–47)
HCT VFR BLD AUTO: 29.9 % (ref 37–47)
HCT VFR BLD AUTO: 30.2 % (ref 37–47)
HCT VFR BLD AUTO: 30.5 % (ref 37–47)
HCT VFR BLD AUTO: 30.8 % (ref 37–47)
HCT VFR BLD AUTO: 31.3 % (ref 37–47)
HCT VFR BLD AUTO: 31.6 % (ref 37–47)
HCT VFR BLD AUTO: 31.8 % (ref 37–47)
HCT VFR BLD AUTO: 32.8 % (ref 37–47)
HCT VFR BLD AUTO: 33 % (ref 37–47)
HCT VFR BLD AUTO: 33 % (ref 37–47)
HCT VFR BLD AUTO: 33.1 % (ref 37–47)
HCT VFR BLD AUTO: 33.6 % (ref 37–47)
HCT VFR BLD AUTO: 33.7 % (ref 37–47)
HCT VFR BLD AUTO: 34.4 % (ref 37–47)
HCT VFR BLD AUTO: 34.6 % (ref 37–47)
HCT VFR BLD AUTO: 34.7 % (ref 37–47)
HCT VFR BLD AUTO: 35.6 % (ref 37–47)
HCT VFR BLD AUTO: 35.7 % (ref 37–47)
HCT VFR BLD AUTO: 35.7 % (ref 37–47)
HCT VFR BLD AUTO: 36.3 % (ref 37–47)
HCT VFR BLD AUTO: 36.8 % (ref 37–47)
HCT VFR BLD AUTO: 36.9 % (ref 37–47)
HCT VFR BLD AUTO: 38 % (ref 37–47)
HCT VFR BLD AUTO: 39.6 % (ref 37–47)
HCT VFR BLD AUTO: 40.9 % (ref 37–47)
HCT VFR BLD AUTO: 42 % (ref 37–47)
HCT VFR BLD AUTO: 43.5 % (ref 37–47)
HCT VFR BLD AUTO: 44.8 % (ref 37–47)
HCV AB SER DONR QL: NEGATIVE
HCV S/C RATIO: 0.09 (ref 0–0.99)
HGB BLD-MCNC: 10 G/DL (ref 12–16)
HGB BLD-MCNC: 10 G/DL (ref 12–16)
HGB BLD-MCNC: 10.1 G/DL (ref 12–16)
HGB BLD-MCNC: 10.2 G/DL (ref 12–16)
HGB BLD-MCNC: 10.2 G/DL (ref 12–16)
HGB BLD-MCNC: 10.3 G/DL (ref 12–16)
HGB BLD-MCNC: 10.4 G/DL (ref 12–16)
HGB BLD-MCNC: 10.7 G/DL (ref 12–16)
HGB BLD-MCNC: 10.8 G/DL (ref 12–16)
HGB BLD-MCNC: 10.9 G/DL (ref 12–16)
HGB BLD-MCNC: 11 G/DL (ref 12–16)
HGB BLD-MCNC: 11 G/DL (ref 12–16)
HGB BLD-MCNC: 11.2 G/DL (ref 12–16)
HGB BLD-MCNC: 11.3 G/DL (ref 12–16)
HGB BLD-MCNC: 11.8 G/DL (ref 12–16)
HGB BLD-MCNC: 11.9 G/DL (ref 12–16)
HGB BLD-MCNC: 12.1 G/DL (ref 12–16)
HGB BLD-MCNC: 12.1 G/DL (ref 12–16)
HGB BLD-MCNC: 12.2 G/DL (ref 12–16)
HGB BLD-MCNC: 12.2 G/DL (ref 12–16)
HGB BLD-MCNC: 12.7 G/DL (ref 12–16)
HGB BLD-MCNC: 13 G/DL (ref 12–16)
HGB BLD-MCNC: 13.3 G/DL (ref 12–16)
HGB BLD-MCNC: 14.6 G/DL (ref 12–16)
HGB BLD-MCNC: 14.9 G/DL (ref 12–16)
HGB BLD-MCNC: 15.2 G/DL (ref 12–16)
HGB BLD-MCNC: 9.1 G/DL (ref 12–16)
HGB BLD-MCNC: 9.4 G/DL (ref 12–16)
HGB BLD-MCNC: 9.7 G/DL (ref 12–16)
HGB BLDA-MCNC: 14.1 G/DL (ref 13.5–17.5)
HGB UR QL STRIP.AUTO: ABNORMAL
HGB UR QL STRIP.AUTO: NEGATIVE
HOLD SPECIMEN: NORMAL
HYALINE CASTS UR QL AUTO: ABNORMAL /LPF
HYPOCHROMIA BLD QL: ABNORMAL
IGA SERPL-MCNC: 352 MG/DL (ref 64–422)
IGG SERPL-MCNC: 946 MG/DL (ref 700–1600)
IGM SERPL-MCNC: 115 MG/DL (ref 26–217)
IMM GRANULOCYTES # BLD: 0.02 10*3/MM3 (ref 0–0.03)
IMM GRANULOCYTES # BLD: 0.02 10*3/MM3 (ref 0–0.03)
IMM GRANULOCYTES # BLD: 0.03 10*3/MM3 (ref 0–0.03)
IMM GRANULOCYTES # BLD: 0.04 10*3/MM3 (ref 0–0.03)
IMM GRANULOCYTES # BLD: 0.05 10*3/MM3 (ref 0–0.03)
IMM GRANULOCYTES # BLD: 0.06 10*3/MM3 (ref 0–0.03)
IMM GRANULOCYTES # BLD: 0.07 10*3/MM3 (ref 0–0.03)
IMM GRANULOCYTES # BLD: 0.09 10*3/MM3 (ref 0–0.03)
IMM GRANULOCYTES # BLD: 0.09 10*3/MM3 (ref 0–0.03)
IMM GRANULOCYTES # BLD: 0.11 10*3/MM3 (ref 0–0.03)
IMM GRANULOCYTES # BLD: 0.14 10*3/MM3 (ref 0–0.03)
IMM GRANULOCYTES # BLD: 0.17 10*3/MM3 (ref 0–0.03)
IMM GRANULOCYTES # BLD: 0.21 10*3/MM3 (ref 0–0.03)
IMM GRANULOCYTES # BLD: 0.21 10*3/MM3 (ref 0–0.03)
IMM GRANULOCYTES # BLD: 0.23 10*3/MM3 (ref 0–0.03)
IMM GRANULOCYTES # BLD: 0.24 10*3/MM3 (ref 0–0.03)
IMM GRANULOCYTES # BLD: 0.27 10*3/MM3 (ref 0–0.03)
IMM GRANULOCYTES # BLD: 0.57 10*3/MM3 (ref 0–0.03)
IMM GRANULOCYTES NFR BLD: 0.3 % (ref 0–5)
IMM GRANULOCYTES NFR BLD: 0.4 % (ref 0–5)
IMM GRANULOCYTES NFR BLD: 0.5 % (ref 0–5)
IMM GRANULOCYTES NFR BLD: 0.6 % (ref 0–5)
IMM GRANULOCYTES NFR BLD: 0.7 % (ref 0–5)
IMM GRANULOCYTES NFR BLD: 0.8 % (ref 0–5)
IMM GRANULOCYTES NFR BLD: 0.9 % (ref 0–5)
IMM GRANULOCYTES NFR BLD: 1 % (ref 0–5)
IMM GRANULOCYTES NFR BLD: 1.2 % (ref 0–5)
IMM GRANULOCYTES NFR BLD: 1.2 % (ref 0–5)
IMM GRANULOCYTES NFR BLD: 1.3 % (ref 0–5)
IMM GRANULOCYTES NFR BLD: 1.4 % (ref 0–5)
IMM GRANULOCYTES NFR BLD: 1.6 % (ref 0–5)
IMM GRANULOCYTES NFR BLD: 1.7 % (ref 0–5)
IMM GRANULOCYTES NFR BLD: 1.7 % (ref 0–5)
IMM GRANULOCYTES NFR BLD: 3.4 % (ref 0–5)
INR PPP: 1 (ref 0.91–1.09)
INR PPP: 1.16 (ref 0.91–1.09)
INR PPP: 1.24 (ref 0.91–1.09)
IRON 24H UR-MRATE: 31 MCG/DL (ref 42–180)
IRON SATN MFR SERPL: 8 % (ref 20–45)
KETONES UR QL STRIP: ABNORMAL
KETONES UR QL STRIP: ABNORMAL
KETONES UR QL STRIP: NEGATIVE
LAB AP CASE REPORT: NORMAL
LEFT ATRIUM VOLUME INDEX: 61.4 ML/M2
LEUKOCYTE ESTERASE UR QL STRIP.AUTO: ABNORMAL
LIPASE SERPL-CCNC: 16 U/L (ref 23–203)
LIPASE SERPL-CCNC: 22 U/L (ref 23–203)
LIPASE SERPL-CCNC: 24 U/L (ref 23–203)
LIPASE SERPL-CCNC: 41 U/L (ref 23–203)
LIPASE SERPL-CCNC: 42 U/L (ref 23–203)
LIPASE SERPL-CCNC: 44 U/L (ref 23–203)
LIPASE SERPL-CCNC: 45 U/L (ref 23–203)
LIPASE SERPL-CCNC: 56 U/L (ref 23–203)
LIPASE SERPL-CCNC: 63 U/L (ref 23–203)
LIPASE SERPL-CCNC: 88 U/L (ref 23–203)
LYMPHOCYTES # BLD AUTO: 0.21 10*3/MM3 (ref 0.72–4.86)
LYMPHOCYTES # BLD AUTO: 0.32 10*3/MM3 (ref 0.72–4.86)
LYMPHOCYTES # BLD AUTO: 0.34 10*3/MM3 (ref 0.72–4.86)
LYMPHOCYTES # BLD AUTO: 0.35 10*3/MM3 (ref 0.72–4.86)
LYMPHOCYTES # BLD AUTO: 0.35 10*3/MM3 (ref 0.72–4.86)
LYMPHOCYTES # BLD AUTO: 0.36 10*3/MM3 (ref 0.72–4.86)
LYMPHOCYTES # BLD AUTO: 0.38 10*3/MM3 (ref 0.72–4.86)
LYMPHOCYTES # BLD AUTO: 0.38 10*3/MM3 (ref 0.72–4.86)
LYMPHOCYTES # BLD AUTO: 0.39 10*3/MM3 (ref 0.72–4.86)
LYMPHOCYTES # BLD AUTO: 0.41 10*3/MM3 (ref 0.72–4.86)
LYMPHOCYTES # BLD AUTO: 0.43 10*3/MM3 (ref 0.72–4.86)
LYMPHOCYTES # BLD AUTO: 0.45 10*3/MM3 (ref 0.72–4.86)
LYMPHOCYTES # BLD AUTO: 0.46 10*3/MM3 (ref 0.72–4.86)
LYMPHOCYTES # BLD AUTO: 0.46 10*3/MM3 (ref 0.72–4.86)
LYMPHOCYTES # BLD AUTO: 0.5 10*3/MM3 (ref 0.72–4.86)
LYMPHOCYTES # BLD AUTO: 0.51 10*3/MM3 (ref 0.72–4.86)
LYMPHOCYTES # BLD AUTO: 0.52 10*3/MM3 (ref 0.72–4.86)
LYMPHOCYTES # BLD AUTO: 0.53 10*3/MM3 (ref 0.72–4.86)
LYMPHOCYTES # BLD AUTO: 0.54 10*3/MM3 (ref 0.72–4.86)
LYMPHOCYTES # BLD AUTO: 0.55 10*3/MM3 (ref 0.72–4.86)
LYMPHOCYTES # BLD AUTO: 0.56 10*3/MM3 (ref 0.72–4.86)
LYMPHOCYTES # BLD AUTO: 0.57 10*3/MM3 (ref 0.72–4.86)
LYMPHOCYTES # BLD AUTO: 0.58 10*3/MM3 (ref 0.72–4.86)
LYMPHOCYTES # BLD AUTO: 0.66 10*3/MM3 (ref 0.72–4.86)
LYMPHOCYTES # BLD AUTO: 0.71 10*3/MM3 (ref 0.72–4.86)
LYMPHOCYTES # BLD AUTO: 0.73 10*3/MM3 (ref 0.72–4.86)
LYMPHOCYTES # BLD MANUAL: 0.17 10*3/MM3 (ref 0.72–4.86)
LYMPHOCYTES NFR BLD AUTO: 10.7 % (ref 15–45)
LYMPHOCYTES NFR BLD AUTO: 11 % (ref 15–45)
LYMPHOCYTES NFR BLD AUTO: 2 % (ref 15–45)
LYMPHOCYTES NFR BLD AUTO: 2.3 % (ref 15–45)
LYMPHOCYTES NFR BLD AUTO: 2.6 % (ref 15–45)
LYMPHOCYTES NFR BLD AUTO: 2.8 % (ref 15–45)
LYMPHOCYTES NFR BLD AUTO: 2.9 % (ref 15–45)
LYMPHOCYTES NFR BLD AUTO: 3.2 % (ref 15–45)
LYMPHOCYTES NFR BLD AUTO: 3.4 % (ref 15–45)
LYMPHOCYTES NFR BLD AUTO: 3.5 % (ref 15–45)
LYMPHOCYTES NFR BLD AUTO: 3.6 % (ref 15–45)
LYMPHOCYTES NFR BLD AUTO: 3.7 % (ref 15–45)
LYMPHOCYTES NFR BLD AUTO: 3.7 % (ref 15–45)
LYMPHOCYTES NFR BLD AUTO: 4 % (ref 15–45)
LYMPHOCYTES NFR BLD AUTO: 5 % (ref 15–45)
LYMPHOCYTES NFR BLD AUTO: 5.2 % (ref 15–45)
LYMPHOCYTES NFR BLD AUTO: 6.1 % (ref 15–45)
LYMPHOCYTES NFR BLD AUTO: 6.1 % (ref 15–45)
LYMPHOCYTES NFR BLD AUTO: 6.2 % (ref 15–45)
LYMPHOCYTES NFR BLD AUTO: 6.6 % (ref 15–45)
LYMPHOCYTES NFR BLD AUTO: 6.7 % (ref 15–45)
LYMPHOCYTES NFR BLD AUTO: 7 % (ref 15–45)
LYMPHOCYTES NFR BLD AUTO: 7.2 % (ref 15–45)
LYMPHOCYTES NFR BLD AUTO: 7.4 % (ref 15–45)
LYMPHOCYTES NFR BLD AUTO: 7.5 % (ref 15–45)
LYMPHOCYTES NFR BLD AUTO: 8.4 % (ref 15–45)
LYMPHOCYTES NFR BLD AUTO: 8.5 % (ref 15–45)
LYMPHOCYTES NFR BLD AUTO: 9.4 % (ref 15–45)
LYMPHOCYTES NFR BLD MANUAL: 3 % (ref 15–45)
LYMPHOCYTES NFR BLD MANUAL: 6 % (ref 4–12)
Lab: ABNORMAL
Lab: NORMAL
MAGNESIUM SERPL-MCNC: 1.9 MG/DL (ref 1.4–2.2)
MAGNESIUM SERPL-MCNC: 1.9 MG/DL (ref 1.4–2.2)
MAGNESIUM SERPL-MCNC: 2.1 MG/DL (ref 1.4–2.2)
MAGNESIUM SERPL-MCNC: 2.2 MG/DL (ref 1.4–2.2)
MAGNESIUM SERPL-MCNC: 2.2 MG/DL (ref 1.4–2.2)
MAGNESIUM SERPL-MCNC: 2.3 MG/DL (ref 1.4–2.2)
MAGNESIUM SERPL-MCNC: 2.4 MG/DL (ref 1.4–2.2)
MAGNESIUM SERPL-MCNC: 2.4 MG/DL (ref 1.4–2.2)
MAXIMAL PREDICTED HEART RATE: 148 BPM
MCH RBC QN AUTO: 26.1 PG (ref 28–32)
MCH RBC QN AUTO: 26.3 PG (ref 28–32)
MCH RBC QN AUTO: 26.7 PG (ref 28–32)
MCH RBC QN AUTO: 26.8 PG (ref 28–32)
MCH RBC QN AUTO: 27.1 PG (ref 28–32)
MCH RBC QN AUTO: 27.2 PG (ref 28–32)
MCH RBC QN AUTO: 27.2 PG (ref 28–32)
MCH RBC QN AUTO: 27.3 PG (ref 28–32)
MCH RBC QN AUTO: 27.4 PG (ref 28–32)
MCH RBC QN AUTO: 27.4 PG (ref 28–32)
MCH RBC QN AUTO: 27.5 PG (ref 28–32)
MCH RBC QN AUTO: 27.5 PG (ref 28–32)
MCH RBC QN AUTO: 27.6 PG (ref 28–32)
MCH RBC QN AUTO: 27.7 PG (ref 28–32)
MCH RBC QN AUTO: 27.8 PG (ref 28–32)
MCH RBC QN AUTO: 27.8 PG (ref 28–32)
MCH RBC QN AUTO: 27.9 PG (ref 28–32)
MCH RBC QN AUTO: 28 PG (ref 28–32)
MCH RBC QN AUTO: 28.1 PG (ref 28–32)
MCH RBC QN AUTO: 28.3 PG (ref 28–32)
MCH RBC QN AUTO: 28.3 PG (ref 28–32)
MCH RBC QN AUTO: 28.4 PG (ref 28–32)
MCH RBC QN AUTO: 28.6 PG (ref 28–32)
MCHC RBC AUTO-ENTMCNC: 32.3 G/DL (ref 33–36)
MCHC RBC AUTO-ENTMCNC: 32.3 G/DL (ref 33–36)
MCHC RBC AUTO-ENTMCNC: 32.5 G/DL (ref 33–36)
MCHC RBC AUTO-ENTMCNC: 32.8 G/DL (ref 33–36)
MCHC RBC AUTO-ENTMCNC: 33 G/DL (ref 33–36)
MCHC RBC AUTO-ENTMCNC: 33 G/DL (ref 33–36)
MCHC RBC AUTO-ENTMCNC: 33.2 G/DL (ref 33–36)
MCHC RBC AUTO-ENTMCNC: 33.3 G/DL (ref 33–36)
MCHC RBC AUTO-ENTMCNC: 33.4 G/DL (ref 33–36)
MCHC RBC AUTO-ENTMCNC: 33.6 G/DL (ref 33–36)
MCHC RBC AUTO-ENTMCNC: 33.7 G/DL (ref 33–36)
MCHC RBC AUTO-ENTMCNC: 33.8 G/DL (ref 33–36)
MCHC RBC AUTO-ENTMCNC: 33.8 G/DL (ref 33–36)
MCHC RBC AUTO-ENTMCNC: 33.9 G/DL (ref 33–36)
MCHC RBC AUTO-ENTMCNC: 34 G/DL (ref 33–36)
MCHC RBC AUTO-ENTMCNC: 34.1 G/DL (ref 33–36)
MCHC RBC AUTO-ENTMCNC: 34.2 G/DL (ref 33–36)
MCHC RBC AUTO-ENTMCNC: 34.3 G/DL (ref 33–36)
MCHC RBC AUTO-ENTMCNC: 34.3 G/DL (ref 33–36)
MCHC RBC AUTO-ENTMCNC: 34.5 G/DL (ref 33–36)
MCHC RBC AUTO-ENTMCNC: 34.5 G/DL (ref 33–36)
MCHC RBC AUTO-ENTMCNC: 34.8 G/DL (ref 33–36)
MCHC RBC AUTO-ENTMCNC: 35 G/DL (ref 33–36)
MCV RBC AUTO: 79.5 FL (ref 82–98)
MCV RBC AUTO: 80.3 FL (ref 82–98)
MCV RBC AUTO: 80.3 FL (ref 82–98)
MCV RBC AUTO: 80.8 FL (ref 82–98)
MCV RBC AUTO: 81 FL (ref 82–98)
MCV RBC AUTO: 81.2 FL (ref 82–98)
MCV RBC AUTO: 81.3 FL (ref 82–98)
MCV RBC AUTO: 81.3 FL (ref 82–98)
MCV RBC AUTO: 81.4 FL (ref 82–98)
MCV RBC AUTO: 81.5 FL (ref 82–98)
MCV RBC AUTO: 81.8 FL (ref 82–98)
MCV RBC AUTO: 81.9 FL (ref 82–98)
MCV RBC AUTO: 81.9 FL (ref 82–98)
MCV RBC AUTO: 82 FL (ref 82–98)
MCV RBC AUTO: 82 FL (ref 82–98)
MCV RBC AUTO: 82.2 FL (ref 82–98)
MCV RBC AUTO: 82.3 FL (ref 82–98)
MCV RBC AUTO: 82.4 FL (ref 82–98)
MCV RBC AUTO: 82.4 FL (ref 82–98)
MCV RBC AUTO: 82.5 FL (ref 82–98)
MCV RBC AUTO: 82.7 FL (ref 82–98)
MCV RBC AUTO: 82.8 FL (ref 82–98)
MCV RBC AUTO: 82.9 FL (ref 82–98)
MCV RBC AUTO: 83 FL (ref 82–98)
MCV RBC AUTO: 83 FL (ref 82–98)
MCV RBC AUTO: 83.2 FL (ref 82–98)
MCV RBC AUTO: 83.5 FL (ref 82–98)
MCV RBC AUTO: 83.6 FL (ref 82–98)
MCV RBC AUTO: 83.7 FL (ref 82–98)
MCV RBC AUTO: 85 FL (ref 82–98)
MCV RBC AUTO: 85.4 FL (ref 82–98)
METHGB BLD QL: 0.6 % (ref 0–3)
MICROCYTES BLD QL: ABNORMAL
MODALITY: ABNORMAL
MONOCYTES # BLD AUTO: 0.16 10*3/MM3 (ref 0.19–1.3)
MONOCYTES # BLD AUTO: 0.34 10*3/MM3 (ref 0.19–1.3)
MONOCYTES # BLD AUTO: 0.42 10*3/MM3 (ref 0.19–1.3)
MONOCYTES # BLD AUTO: 0.43 10*3/MM3 (ref 0.19–1.3)
MONOCYTES # BLD AUTO: 0.51 10*3/MM3 (ref 0.19–1.3)
MONOCYTES # BLD AUTO: 0.51 10*3/MM3 (ref 0.19–1.3)
MONOCYTES # BLD AUTO: 0.54 10*3/MM3 (ref 0.19–1.3)
MONOCYTES # BLD AUTO: 0.56 10*3/MM3 (ref 0.19–1.3)
MONOCYTES # BLD AUTO: 0.57 10*3/MM3 (ref 0.19–1.3)
MONOCYTES # BLD AUTO: 0.58 10*3/MM3 (ref 0.19–1.3)
MONOCYTES # BLD AUTO: 0.61 10*3/MM3 (ref 0.19–1.3)
MONOCYTES # BLD AUTO: 0.64 10*3/MM3 (ref 0.19–1.3)
MONOCYTES # BLD AUTO: 0.65 10*3/MM3 (ref 0.19–1.3)
MONOCYTES # BLD AUTO: 0.66 10*3/MM3 (ref 0.19–1.3)
MONOCYTES # BLD AUTO: 0.68 10*3/MM3 (ref 0.19–1.3)
MONOCYTES # BLD AUTO: 0.7 10*3/MM3 (ref 0.19–1.3)
MONOCYTES # BLD AUTO: 0.71 10*3/MM3 (ref 0.19–1.3)
MONOCYTES # BLD AUTO: 0.72 10*3/MM3 (ref 0.19–1.3)
MONOCYTES # BLD AUTO: 0.73 10*3/MM3 (ref 0.19–1.3)
MONOCYTES # BLD AUTO: 0.77 10*3/MM3 (ref 0.19–1.3)
MONOCYTES # BLD AUTO: 0.84 10*3/MM3 (ref 0.19–1.3)
MONOCYTES # BLD AUTO: 0.87 10*3/MM3 (ref 0.19–1.3)
MONOCYTES # BLD AUTO: 0.87 10*3/MM3 (ref 0.19–1.3)
MONOCYTES # BLD AUTO: 0.93 10*3/MM3 (ref 0.19–1.3)
MONOCYTES # BLD AUTO: 1.11 10*3/MM3 (ref 0.19–1.3)
MONOCYTES # BLD AUTO: 1.22 10*3/MM3 (ref 0.19–1.3)
MONOCYTES # BLD AUTO: 1.26 10*3/MM3 (ref 0.19–1.3)
MONOCYTES # BLD AUTO: 1.27 10*3/MM3 (ref 0.19–1.3)
MONOCYTES # BLD AUTO: 1.29 10*3/MM3 (ref 0.19–1.3)
MONOCYTES # BLD AUTO: 1.34 10*3/MM3 (ref 0.19–1.3)
MONOCYTES # BLD AUTO: 1.4 10*3/MM3 (ref 0.19–1.3)
MONOCYTES NFR BLD AUTO: 10 % (ref 4–12)
MONOCYTES NFR BLD AUTO: 10.7 % (ref 4–12)
MONOCYTES NFR BLD AUTO: 10.7 % (ref 4–12)
MONOCYTES NFR BLD AUTO: 12.3 % (ref 4–12)
MONOCYTES NFR BLD AUTO: 13.4 % (ref 4–12)
MONOCYTES NFR BLD AUTO: 13.7 % (ref 4–12)
MONOCYTES NFR BLD AUTO: 15.5 % (ref 4–12)
MONOCYTES NFR BLD AUTO: 16.4 % (ref 4–12)
MONOCYTES NFR BLD AUTO: 2 % (ref 4–12)
MONOCYTES NFR BLD AUTO: 4.1 % (ref 4–12)
MONOCYTES NFR BLD AUTO: 4.2 % (ref 4–12)
MONOCYTES NFR BLD AUTO: 5.5 % (ref 4–12)
MONOCYTES NFR BLD AUTO: 5.8 % (ref 4–12)
MONOCYTES NFR BLD AUTO: 6.3 % (ref 4–12)
MONOCYTES NFR BLD AUTO: 6.6 % (ref 4–12)
MONOCYTES NFR BLD AUTO: 6.7 % (ref 4–12)
MONOCYTES NFR BLD AUTO: 6.7 % (ref 4–12)
MONOCYTES NFR BLD AUTO: 6.8 % (ref 4–12)
MONOCYTES NFR BLD AUTO: 7.2 % (ref 4–12)
MONOCYTES NFR BLD AUTO: 7.9 % (ref 4–12)
MONOCYTES NFR BLD AUTO: 8.2 % (ref 4–12)
MONOCYTES NFR BLD AUTO: 8.2 % (ref 4–12)
MONOCYTES NFR BLD AUTO: 8.3 % (ref 4–12)
MONOCYTES NFR BLD AUTO: 8.4 % (ref 4–12)
MONOCYTES NFR BLD AUTO: 8.5 % (ref 4–12)
MONOCYTES NFR BLD AUTO: 8.7 % (ref 4–12)
MONOCYTES NFR BLD AUTO: 9.3 % (ref 4–12)
MONOCYTES NFR BLD AUTO: 9.4 % (ref 4–12)
MONOCYTES NFR BLD AUTO: 9.5 % (ref 4–12)
MONOCYTES NFR BLD AUTO: 9.7 % (ref 4–12)
NEUTROPHILS # BLD AUTO: 10.67 10*3/MM3 (ref 1.87–8.4)
NEUTROPHILS # BLD AUTO: 11.06 10*3/MM3 (ref 1.87–8.4)
NEUTROPHILS # BLD AUTO: 11.19 10*3/MM3 (ref 1.87–8.4)
NEUTROPHILS # BLD AUTO: 11.92 10*3/MM3 (ref 1.87–8.4)
NEUTROPHILS # BLD AUTO: 12.67 10*3/MM3 (ref 1.87–8.4)
NEUTROPHILS # BLD AUTO: 14.47 10*3/MM3 (ref 1.87–8.4)
NEUTROPHILS # BLD AUTO: 14.92 10*3/MM3 (ref 1.87–8.4)
NEUTROPHILS # BLD AUTO: 17.47 10*3/MM3 (ref 1.87–8.4)
NEUTROPHILS # BLD AUTO: 18.51 10*3/MM3 (ref 1.87–8.4)
NEUTROPHILS # BLD AUTO: 3.84 10*3/MM3 (ref 1.87–8.4)
NEUTROPHILS # BLD AUTO: 3.9 10*3/MM3 (ref 1.87–8.4)
NEUTROPHILS # BLD AUTO: 3.93 10*3/MM3 (ref 1.87–8.4)
NEUTROPHILS # BLD AUTO: 4.01 10*3/MM3 (ref 1.87–8.4)
NEUTROPHILS # BLD AUTO: 4.28 10*3/MM3 (ref 1.87–8.4)
NEUTROPHILS # BLD AUTO: 4.46 10*3/MM3 (ref 1.87–8.4)
NEUTROPHILS # BLD AUTO: 4.69 10*3/MM3 (ref 1.87–8.4)
NEUTROPHILS # BLD AUTO: 5.03 10*3/MM3 (ref 1.87–8.4)
NEUTROPHILS # BLD AUTO: 5.14 10*3/MM3 (ref 1.87–8.4)
NEUTROPHILS # BLD AUTO: 5.28 10*3/MM3 (ref 1.87–8.4)
NEUTROPHILS # BLD AUTO: 5.29 10*3/MM3 (ref 1.87–8.4)
NEUTROPHILS # BLD AUTO: 5.58 10*3/MM3 (ref 1.87–8.4)
NEUTROPHILS # BLD AUTO: 6.38 10*3/MM3 (ref 1.87–8.4)
NEUTROPHILS # BLD AUTO: 6.67 10*3/MM3 (ref 1.87–8.4)
NEUTROPHILS # BLD AUTO: 6.81 10*3/MM3 (ref 1.87–8.4)
NEUTROPHILS # BLD AUTO: 7.14 10*3/MM3 (ref 1.87–8.4)
NEUTROPHILS # BLD AUTO: 7.47 10*3/MM3 (ref 1.87–8.4)
NEUTROPHILS # BLD AUTO: 7.6 10*3/MM3 (ref 1.87–8.4)
NEUTROPHILS # BLD AUTO: 8.08 10*3/MM3 (ref 1.87–8.4)
NEUTROPHILS # BLD AUTO: 8.41 10*3/MM3 (ref 1.87–8.4)
NEUTROPHILS # BLD AUTO: 9.82 10*3/MM3 (ref 1.87–8.4)
NEUTROPHILS # BLD AUTO: 9.92 10*3/MM3 (ref 1.87–8.4)
NEUTROPHILS NFR BLD AUTO: 73.7 % (ref 39–78)
NEUTROPHILS NFR BLD AUTO: 73.7 % (ref 39–78)
NEUTROPHILS NFR BLD AUTO: 73.9 % (ref 39–78)
NEUTROPHILS NFR BLD AUTO: 77.4 % (ref 39–78)
NEUTROPHILS NFR BLD AUTO: 78.8 % (ref 39–78)
NEUTROPHILS NFR BLD AUTO: 79.2 % (ref 39–78)
NEUTROPHILS NFR BLD AUTO: 80.8 % (ref 39–78)
NEUTROPHILS NFR BLD AUTO: 81.2 % (ref 39–78)
NEUTROPHILS NFR BLD AUTO: 81.3 % (ref 39–78)
NEUTROPHILS NFR BLD AUTO: 81.4 % (ref 39–78)
NEUTROPHILS NFR BLD AUTO: 82 % (ref 39–78)
NEUTROPHILS NFR BLD AUTO: 82.7 % (ref 39–78)
NEUTROPHILS NFR BLD AUTO: 83.2 % (ref 39–78)
NEUTROPHILS NFR BLD AUTO: 83.7 % (ref 39–78)
NEUTROPHILS NFR BLD AUTO: 83.8 % (ref 39–78)
NEUTROPHILS NFR BLD AUTO: 83.8 % (ref 39–78)
NEUTROPHILS NFR BLD AUTO: 84.2 % (ref 39–78)
NEUTROPHILS NFR BLD AUTO: 84.6 % (ref 39–78)
NEUTROPHILS NFR BLD AUTO: 84.8 % (ref 39–78)
NEUTROPHILS NFR BLD AUTO: 85.9 % (ref 39–78)
NEUTROPHILS NFR BLD AUTO: 85.9 % (ref 39–78)
NEUTROPHILS NFR BLD AUTO: 86.1 % (ref 39–78)
NEUTROPHILS NFR BLD AUTO: 88.3 % (ref 39–78)
NEUTROPHILS NFR BLD AUTO: 88.8 % (ref 39–78)
NEUTROPHILS NFR BLD AUTO: 89 % (ref 39–78)
NEUTROPHILS NFR BLD AUTO: 89.2 % (ref 39–78)
NEUTROPHILS NFR BLD AUTO: 89.4 % (ref 39–78)
NEUTROPHILS NFR BLD AUTO: 91 % (ref 39–78)
NEUTROPHILS NFR BLD AUTO: 92.4 % (ref 39–78)
NEUTROPHILS NFR BLD AUTO: 93.2 % (ref 39–78)
NEUTROPHILS NFR BLD MANUAL: 91 % (ref 39–78)
NITRITE UR QL STRIP: NEGATIVE
NOTIFIED BY: ABNORMAL
NOTIFIED WHO: ABNORMAL
NRBC BLD MANUAL-RTO: 0 /100 WBC (ref 0–0)
NRBC BLD MANUAL-RTO: 0.2 /100 WBC (ref 0–0)
NRBC BLD MANUAL-RTO: 0.3 /100 WBC (ref 0–0)
NRBC BLD MANUAL-RTO: 0.3 /100 WBC (ref 0–0)
NRBC BLD MANUAL-RTO: 0.4 /100 WBC (ref 0–0)
NRBC BLD MANUAL-RTO: 0.5 /100 WBC (ref 0–0)
NRBC BLD MANUAL-RTO: 0.5 /100 WBC (ref 0–0)
NRBC BLD MANUAL-RTO: 1.7 /100 WBC (ref 0–0)
NRBC BLD MANUAL-RTO: 2.2 /100 WBC (ref 0–0)
NRBC BLD MANUAL-RTO: 3.7 /100 WBC (ref 0–0)
NRBC BLD MANUAL-RTO: 5.1 /100 WBC (ref 0–0)
NRBC SPEC MANUAL: 5 /100 WBC (ref 0–0)
NT-PROBNP SERPL-MCNC: 6150 PG/ML (ref 0–900)
NT-PROBNP SERPL-MCNC: 8790 PG/ML (ref 0–900)
NT-PROBNP SERPL-MCNC: ABNORMAL PG/ML (ref 0–900)
OXYHGB MFR BLDV: 15 % (ref 45–75)
P-ANCA ATYPICAL TITR SER IF: NORMAL TITER
P-ANCA TITR SER IF: NORMAL TITER
PATH REPORT.FINAL DX SPEC: NORMAL
PATH REPORT.GROSS SPEC: NORMAL
PCO2 BLDA: 29 MM HG (ref 35–45)
PCO2 BLDA: 29.7 MM HG (ref 35–45)
PCO2 BLDA: 40.6 MM HG (ref 35–45)
PCO2 BLDV: 56.2 MM HG (ref 41–51)
PH BLDA: 7.41 PH UNITS (ref 7.35–7.45)
PH BLDA: 7.46 PH UNITS (ref 7.35–7.45)
PH BLDA: 7.49 PH UNITS (ref 7.35–7.45)
PH BLDV: 7.29 PH UNITS (ref 7.32–7.42)
PH UR STRIP.AUTO: 6 [PH] (ref 5–8)
PH UR STRIP.AUTO: 6 [PH] (ref 5–8)
PH UR STRIP.AUTO: <=5 [PH] (ref 5–8)
PHOSPHATE SERPL-MCNC: 6.3 MG/DL (ref 2.5–4.5)
PHOSPHATE SERPL-MCNC: 6.3 MG/DL (ref 2.5–4.5)
PLATELET # BLD AUTO: 167 10*3/MM3 (ref 130–400)
PLATELET # BLD AUTO: 174 10*3/MM3 (ref 130–400)
PLATELET # BLD AUTO: 180 10*3/MM3 (ref 130–400)
PLATELET # BLD AUTO: 202 10*3/MM3 (ref 130–400)
PLATELET # BLD AUTO: 218 10*3/MM3 (ref 130–400)
PLATELET # BLD AUTO: 223 10*3/MM3 (ref 130–400)
PLATELET # BLD AUTO: 226 10*3/MM3 (ref 130–400)
PLATELET # BLD AUTO: 227 10*3/MM3 (ref 130–400)
PLATELET # BLD AUTO: 229 10*3/MM3 (ref 130–400)
PLATELET # BLD AUTO: 231 10*3/MM3 (ref 130–400)
PLATELET # BLD AUTO: 232 10*3/MM3 (ref 130–400)
PLATELET # BLD AUTO: 248 10*3/MM3 (ref 130–400)
PLATELET # BLD AUTO: 250 10*3/MM3 (ref 130–400)
PLATELET # BLD AUTO: 251 10*3/MM3 (ref 130–400)
PLATELET # BLD AUTO: 252 10*3/MM3 (ref 130–400)
PLATELET # BLD AUTO: 253 10*3/MM3 (ref 130–400)
PLATELET # BLD AUTO: 254 10*3/MM3 (ref 130–400)
PLATELET # BLD AUTO: 254 10*3/MM3 (ref 130–400)
PLATELET # BLD AUTO: 257 10*3/MM3 (ref 130–400)
PLATELET # BLD AUTO: 270 10*3/MM3 (ref 130–400)
PLATELET # BLD AUTO: 271 10*3/MM3 (ref 130–400)
PLATELET # BLD AUTO: 274 10*3/MM3 (ref 130–400)
PLATELET # BLD AUTO: 276 10*3/MM3 (ref 130–400)
PLATELET # BLD AUTO: 280 10*3/MM3 (ref 130–400)
PLATELET # BLD AUTO: 284 10*3/MM3 (ref 130–400)
PLATELET # BLD AUTO: 297 10*3/MM3 (ref 130–400)
PLATELET # BLD AUTO: 309 10*3/MM3 (ref 130–400)
PLATELET # BLD AUTO: 313 10*3/MM3 (ref 130–400)
PLATELET # BLD AUTO: 320 10*3/MM3 (ref 130–400)
PLATELET # BLD AUTO: 325 10*3/MM3 (ref 130–400)
PLATELET # BLD AUTO: 339 10*3/MM3 (ref 130–400)
PLATELET # BLD AUTO: 356 10*3/MM3 (ref 130–400)
PLATELET # BLD AUTO: 366 10*3/MM3 (ref 130–400)
PMV BLD AUTO: 10.1 FL (ref 6–12)
PMV BLD AUTO: 10.2 FL (ref 6–12)
PMV BLD AUTO: 10.2 FL (ref 6–12)
PMV BLD AUTO: 10.4 FL (ref 6–12)
PMV BLD AUTO: 10.4 FL (ref 6–12)
PMV BLD AUTO: 10.5 FL (ref 6–12)
PMV BLD AUTO: 10.5 FL (ref 6–12)
PMV BLD AUTO: 10.6 FL (ref 6–12)
PMV BLD AUTO: 10.6 FL (ref 6–12)
PMV BLD AUTO: 10.7 FL (ref 6–12)
PMV BLD AUTO: 11 FL (ref 6–12)
PMV BLD AUTO: 11.9 FL (ref 6–12)
PMV BLD AUTO: 9 FL (ref 6–12)
PMV BLD AUTO: 9.2 FL (ref 6–12)
PMV BLD AUTO: 9.3 FL (ref 6–12)
PMV BLD AUTO: 9.3 FL (ref 6–12)
PMV BLD AUTO: 9.4 FL (ref 6–12)
PMV BLD AUTO: 9.4 FL (ref 6–12)
PMV BLD AUTO: 9.5 FL (ref 6–12)
PMV BLD AUTO: 9.5 FL (ref 6–12)
PMV BLD AUTO: 9.6 FL (ref 6–12)
PMV BLD AUTO: 9.7 FL (ref 6–12)
PMV BLD AUTO: 9.8 FL (ref 6–12)
PMV BLD AUTO: 9.9 FL (ref 6–12)
PO2 BLDA: 68.7 MM HG (ref 83–108)
PO2 BLDA: 81 MM HG (ref 83–108)
PO2 BLDA: 84.2 MM HG (ref 83–108)
PO2 BLDV: 18.8 MM HG (ref 27–53)
POIKILOCYTOSIS BLD QL SMEAR: ABNORMAL
POLYCHROMASIA BLD QL SMEAR: ABNORMAL
POTASSIUM BLD-SCNC: 2.8 MMOL/L (ref 3.5–5.3)
POTASSIUM BLD-SCNC: 3 MMOL/L (ref 3.5–5.3)
POTASSIUM BLD-SCNC: 3.1 MMOL/L (ref 3.5–5.3)
POTASSIUM BLD-SCNC: 3.2 MMOL/L (ref 3.5–5.3)
POTASSIUM BLD-SCNC: 3.3 MMOL/L (ref 3.5–5.3)
POTASSIUM BLD-SCNC: 3.4 MMOL/L (ref 3.5–5.3)
POTASSIUM BLD-SCNC: 3.5 MMOL/L (ref 3.5–5.3)
POTASSIUM BLD-SCNC: 3.6 MMOL/L (ref 3.5–5.3)
POTASSIUM BLD-SCNC: 3.7 MMOL/L (ref 3.5–5.3)
POTASSIUM BLD-SCNC: 3.8 MMOL/L (ref 3.5–5.3)
POTASSIUM BLD-SCNC: 3.9 MMOL/L (ref 3.5–5.3)
POTASSIUM BLD-SCNC: 4 MMOL/L (ref 3.5–5.3)
POTASSIUM BLD-SCNC: 4.1 MMOL/L (ref 3.5–5.3)
POTASSIUM BLD-SCNC: 4.1 MMOL/L (ref 3.5–5.3)
POTASSIUM BLD-SCNC: 4.3 MMOL/L (ref 3.5–5.3)
POTASSIUM BLD-SCNC: 4.4 MMOL/L (ref 3.5–5.3)
POTASSIUM BLD-SCNC: 4.5 MMOL/L (ref 3.5–5.3)
POTASSIUM BLD-SCNC: 4.5 MMOL/L (ref 3.5–5.3)
POTASSIUM BLD-SCNC: 4.6 MMOL/L (ref 3.5–5.3)
POTASSIUM BLD-SCNC: 4.8 MMOL/L (ref 3.5–5.3)
POTASSIUM BLDV-SCNC: 5.1 MMOL/L
PROCALCITONIN SERPL-MCNC: 0.27 NG/ML
PROCALCITONIN SERPL-MCNC: 0.79 NG/ML
PROT PATTERN SERPL IFE-IMP: NORMAL
PROT SERPL-MCNC: 5.7 G/DL (ref 6.3–8.7)
PROT SERPL-MCNC: 5.9 G/DL (ref 6.3–8.7)
PROT SERPL-MCNC: 5.9 G/DL (ref 6.3–8.7)
PROT SERPL-MCNC: 6 G/DL (ref 6.3–8.7)
PROT SERPL-MCNC: 6.2 G/DL (ref 6.3–8.7)
PROT SERPL-MCNC: 6.3 G/DL (ref 6.3–8.7)
PROT SERPL-MCNC: 6.4 G/DL (ref 6.3–8.7)
PROT SERPL-MCNC: 6.5 G/DL (ref 6.3–8.7)
PROT SERPL-MCNC: 6.5 G/DL (ref 6.3–8.7)
PROT SERPL-MCNC: 6.6 G/DL (ref 6.3–8.7)
PROT SERPL-MCNC: 6.8 G/DL (ref 6.3–8.7)
PROT SERPL-MCNC: 6.8 G/DL (ref 6.3–8.7)
PROT SERPL-MCNC: 6.9 G/DL (ref 6.3–8.7)
PROT SERPL-MCNC: 7 G/DL (ref 6.3–8.7)
PROT SERPL-MCNC: 7.1 G/DL (ref 6.3–8.7)
PROT SERPL-MCNC: 7.2 G/DL (ref 6.3–8.7)
PROT SERPL-MCNC: 7.4 G/DL (ref 6.3–8.7)
PROT SERPL-MCNC: 7.5 G/DL (ref 6.3–8.7)
PROT SERPL-MCNC: 7.5 G/DL (ref 6.3–8.7)
PROT SERPL-MCNC: 7.6 G/DL (ref 6.3–8.7)
PROT SERPL-MCNC: 7.7 G/DL (ref 6.3–8.7)
PROT SERPL-MCNC: 7.9 G/DL (ref 6.3–8.7)
PROT SERPL-MCNC: 8.4 G/DL (ref 6.3–8.7)
PROT UR QL STRIP: ABNORMAL
PROT UR QL STRIP: NEGATIVE
PROT UR QL STRIP: NEGATIVE
PROT UR-MCNC: 20 MG/DL (ref 0–13.5)
PROT UR-MCNC: 53 MG/DL (ref 0–13.5)
PROTHROMBIN TIME: 13.5 SECONDS (ref 11.9–14.6)
PROTHROMBIN TIME: 15.2 SECONDS (ref 11.9–14.6)
PROTHROMBIN TIME: 16 SECONDS (ref 11.9–14.6)
PTH-INTACT SERPL-MCNC: 182.1 PG/ML (ref 7.5–53.5)
PTH-INTACT SERPL-MCNC: 250.7 PG/ML (ref 7.5–53.5)
PTH-INTACT SERPL-MCNC: 97.3 PG/ML (ref 7.5–53.5)
RBC # BLD AUTO: 3.49 10*6/MM3 (ref 4.2–5.4)
RBC # BLD AUTO: 3.58 10*6/MM3 (ref 4.2–5.4)
RBC # BLD AUTO: 3.58 10*6/MM3 (ref 4.2–5.4)
RBC # BLD AUTO: 3.62 10*6/MM3 (ref 4.2–5.4)
RBC # BLD AUTO: 3.63 10*6/MM3 (ref 4.2–5.4)
RBC # BLD AUTO: 3.71 10*6/MM3 (ref 4.2–5.4)
RBC # BLD AUTO: 3.72 10*6/MM3 (ref 4.2–5.4)
RBC # BLD AUTO: 3.75 10*6/MM3 (ref 4.2–5.4)
RBC # BLD AUTO: 3.76 10*6/MM3 (ref 4.2–5.4)
RBC # BLD AUTO: 3.82 10*6/MM3 (ref 4.2–5.4)
RBC # BLD AUTO: 3.82 10*6/MM3 (ref 4.2–5.4)
RBC # BLD AUTO: 3.86 10*6/MM3 (ref 4.2–5.4)
RBC # BLD AUTO: 3.95 10*6/MM3 (ref 4.2–5.4)
RBC # BLD AUTO: 4 10*6/MM3 (ref 4.2–5.4)
RBC # BLD AUTO: 4.02 10*6/MM3 (ref 4.2–5.4)
RBC # BLD AUTO: 4.03 10*6/MM3 (ref 4.2–5.4)
RBC # BLD AUTO: 4.04 10*6/MM3 (ref 4.2–5.4)
RBC # BLD AUTO: 4.11 10*6/MM3 (ref 4.2–5.4)
RBC # BLD AUTO: 4.15 10*6/MM3 (ref 4.2–5.4)
RBC # BLD AUTO: 4.21 10*6/MM3 (ref 4.2–5.4)
RBC # BLD AUTO: 4.27 10*6/MM3 (ref 4.2–5.4)
RBC # BLD AUTO: 4.36 10*6/MM3 (ref 4.2–5.4)
RBC # BLD AUTO: 4.37 10*6/MM3 (ref 4.2–5.4)
RBC # BLD AUTO: 4.41 10*6/MM3 (ref 4.2–5.4)
RBC # BLD AUTO: 4.41 10*6/MM3 (ref 4.2–5.4)
RBC # BLD AUTO: 4.49 10*6/MM3 (ref 4.2–5.4)
RBC # BLD AUTO: 4.52 10*6/MM3 (ref 4.2–5.4)
RBC # BLD AUTO: 4.58 10*6/MM3 (ref 4.2–5.4)
RBC # BLD AUTO: 4.66 10*6/MM3 (ref 4.2–5.4)
RBC # BLD AUTO: 4.79 10*6/MM3 (ref 4.2–5.4)
RBC # BLD AUTO: 5.11 10*6/MM3 (ref 4.2–5.4)
RBC # BLD AUTO: 5.32 10*6/MM3 (ref 4.2–5.4)
RBC # BLD AUTO: 5.36 10*6/MM3 (ref 4.2–5.4)
RBC # UR: ABNORMAL /HPF
REF LAB TEST METHOD: ABNORMAL
RENAL EPI CELLS #/AREA URNS HPF: ABNORMAL /HPF
SAO2 % BLDCOA: 93.3 % (ref 94–99)
SAO2 % BLDCOA: 95.8 % (ref 94–99)
SAO2 % BLDCOA: 97.1 % (ref 94–99)
SAO2 % BLDCOV: 15.2 % (ref 45–75)
SODIUM BLD-SCNC: 131 MMOL/L (ref 135–145)
SODIUM BLD-SCNC: 132 MMOL/L (ref 135–145)
SODIUM BLD-SCNC: 132 MMOL/L (ref 135–145)
SODIUM BLD-SCNC: 133 MMOL/L (ref 135–145)
SODIUM BLD-SCNC: 134 MMOL/L (ref 135–145)
SODIUM BLD-SCNC: 135 MMOL/L (ref 135–145)
SODIUM BLD-SCNC: 136 MMOL/L (ref 135–145)
SODIUM BLD-SCNC: 137 MMOL/L (ref 135–145)
SODIUM BLD-SCNC: 138 MMOL/L (ref 135–145)
SODIUM BLD-SCNC: 139 MMOL/L (ref 135–145)
SODIUM BLD-SCNC: 143 MMOL/L (ref 135–145)
SODIUM BLDV-SCNC: 134 MMOL/L
SODIUM UR-SCNC: 43 MMOL/L (ref 30–90)
SODIUM UR-SCNC: <5 MMOL/L (ref 30–90)
SODIUM UR-SCNC: <5 MMOL/L (ref 30–90)
SP GR UR STRIP: 1.01 (ref 1–1.03)
SP GR UR STRIP: 1.01 (ref 1–1.03)
SP GR UR STRIP: 1.02 (ref 1–1.03)
SP GR UR STRIP: 1.03 (ref 1–1.03)
SQUAMOUS #/AREA URNS HPF: ABNORMAL /HPF
STRESS TARGET HR: 126 BPM
TARGETS BLD QL SMEAR: ABNORMAL
TIBC SERPL-MCNC: 399 MCG/DL (ref 225–420)
TROPONIN I SERPL-MCNC: 0.03 NG/ML (ref 0–0.03)
TROPONIN I SERPL-MCNC: 0.03 NG/ML (ref 0–0.03)
TROPONIN I SERPL-MCNC: 0.04 NG/ML (ref 0–0.03)
TROPONIN I SERPL-MCNC: 0.05 NG/ML (ref 0–0.03)
TROPONIN I SERPL-MCNC: 0.06 NG/ML (ref 0–0.03)
TROPONIN I SERPL-MCNC: <0.012 NG/ML (ref 0–0.03)
TSH SERPL DL<=0.05 MIU/L-ACNC: 2.51 MIU/ML (ref 0.47–4.68)
URATE SERPL-MCNC: 7.4 MG/DL (ref 2.7–7.5)
URATE SERPL-MCNC: 7.9 MG/DL (ref 2.7–7.5)
UROBILINOGEN UR QL STRIP: ABNORMAL
UUN 24H UR-MCNC: 319 MG/DL
VENTILATOR MODE: ABNORMAL
WBC MORPH BLD: NORMAL
WBC NRBC COR # BLD: 10.53 10*3/MM3 (ref 4.8–10.8)
WBC NRBC COR # BLD: 11.78 10*3/MM3 (ref 4.8–10.8)
WBC NRBC COR # BLD: 12.15 10*3/MM3 (ref 4.8–10.8)
WBC NRBC COR # BLD: 12.5 10*3/MM3 (ref 4.8–10.8)
WBC NRBC COR # BLD: 12.73 10*3/MM3 (ref 4.8–10.8)
WBC NRBC COR # BLD: 14.1 10*3/MM3 (ref 4.8–10.8)
WBC NRBC COR # BLD: 14.76 10*3/MM3 (ref 4.8–10.8)
WBC NRBC COR # BLD: 16.3 10*3/MM3 (ref 4.8–10.8)
WBC NRBC COR # BLD: 16.9 10*3/MM3 (ref 4.8–10.8)
WBC NRBC COR # BLD: 19.6 10*3/MM3 (ref 4.8–10.8)
WBC NRBC COR # BLD: 20.76 10*3/MM3 (ref 4.8–10.8)
WBC NRBC COR # BLD: 4.81 10*3/MM3 (ref 4.8–10.8)
WBC NRBC COR # BLD: 5.19 10*3/MM3 (ref 4.8–10.8)
WBC NRBC COR # BLD: 5.32 10*3/MM3 (ref 4.8–10.8)
WBC NRBC COR # BLD: 5.43 10*3/MM3 (ref 4.8–10.8)
WBC NRBC COR # BLD: 5.43 10*3/MM3 (ref 4.8–10.8)
WBC NRBC COR # BLD: 5.63 10*3/MM3 (ref 4.8–10.8)
WBC NRBC COR # BLD: 5.65 10*3/MM3 (ref 4.8–10.8)
WBC NRBC COR # BLD: 5.72 10*3/MM3 (ref 4.8–10.8)
WBC NRBC COR # BLD: 5.77 10*3/MM3 (ref 4.8–10.8)
WBC NRBC COR # BLD: 6.19 10*3/MM3 (ref 4.8–10.8)
WBC NRBC COR # BLD: 6.29 10*3/MM3 (ref 4.8–10.8)
WBC NRBC COR # BLD: 6.38 10*3/MM3 (ref 4.8–10.8)
WBC NRBC COR # BLD: 6.83 10*3/MM3 (ref 4.8–10.8)
WBC NRBC COR # BLD: 6.91 10*3/MM3 (ref 4.8–10.8)
WBC NRBC COR # BLD: 7.61 10*3/MM3 (ref 4.8–10.8)
WBC NRBC COR # BLD: 7.96 10*3/MM3 (ref 4.8–10.8)
WBC NRBC COR # BLD: 8.08 10*3/MM3 (ref 4.8–10.8)
WBC NRBC COR # BLD: 8.36 10*3/MM3 (ref 4.8–10.8)
WBC NRBC COR # BLD: 8.59 10*3/MM3 (ref 4.8–10.8)
WBC NRBC COR # BLD: 8.83 10*3/MM3 (ref 4.8–10.8)
WBC NRBC COR # BLD: 9.4 10*3/MM3 (ref 4.8–10.8)
WBC NRBC COR # BLD: 9.92 10*3/MM3 (ref 4.8–10.8)
WBC UR QL AUTO: ABNORMAL /HPF
WHOLE BLOOD HOLD SPECIMEN: NORMAL

## 2018-01-01 PROCEDURE — 83880 ASSAY OF NATRIURETIC PEPTIDE: CPT | Performed by: INTERNAL MEDICINE

## 2018-01-01 PROCEDURE — 83605 ASSAY OF LACTIC ACID: CPT | Performed by: EMERGENCY MEDICINE

## 2018-01-01 PROCEDURE — 25010000002 ENOXAPARIN PER 10 MG: Performed by: SPECIALIST

## 2018-01-01 PROCEDURE — 71046 X-RAY EXAM CHEST 2 VIEWS: CPT

## 2018-01-01 PROCEDURE — 25010000002 ONDANSETRON PER 1 MG: Performed by: SPECIALIST

## 2018-01-01 PROCEDURE — 94760 N-INVAS EAR/PLS OXIMETRY 1: CPT

## 2018-01-01 PROCEDURE — 85025 COMPLETE CBC W/AUTO DIFF WBC: CPT | Performed by: SPECIALIST

## 2018-01-01 PROCEDURE — 25010000002 HEPARIN (PORCINE) PER 1000 UNITS: Performed by: INTERNAL MEDICINE

## 2018-01-01 PROCEDURE — 84300 ASSAY OF URINE SODIUM: CPT | Performed by: INTERNAL MEDICINE

## 2018-01-01 PROCEDURE — 25010000002 PROMETHAZINE PER 50 MG: Performed by: INTERNAL MEDICINE

## 2018-01-01 PROCEDURE — 81001 URINALYSIS AUTO W/SCOPE: CPT | Performed by: EMERGENCY MEDICINE

## 2018-01-01 PROCEDURE — G8979 MOBILITY GOAL STATUS: HCPCS

## 2018-01-01 PROCEDURE — 82570 ASSAY OF URINE CREATININE: CPT | Performed by: EMERGENCY MEDICINE

## 2018-01-01 PROCEDURE — 80048 BASIC METABOLIC PNL TOTAL CA: CPT | Performed by: NURSE PRACTITIONER

## 2018-01-01 PROCEDURE — 93005 ELECTROCARDIOGRAM TRACING: CPT

## 2018-01-01 PROCEDURE — 76775 US EXAM ABDO BACK WALL LIM: CPT

## 2018-01-01 PROCEDURE — 25010000002 PIPERACILLIN SOD-TAZOBACTAM PER 1 G: Performed by: SPECIALIST

## 2018-01-01 PROCEDURE — 80053 COMPREHEN METABOLIC PANEL: CPT | Performed by: EMERGENCY MEDICINE

## 2018-01-01 PROCEDURE — 80053 COMPREHEN METABOLIC PANEL: CPT | Performed by: SPECIALIST

## 2018-01-01 PROCEDURE — 85025 COMPLETE CBC W/AUTO DIFF WBC: CPT | Performed by: EMERGENCY MEDICINE

## 2018-01-01 PROCEDURE — 93010 ELECTROCARDIOGRAM REPORT: CPT | Performed by: INTERNAL MEDICINE

## 2018-01-01 PROCEDURE — 25010000002 MORPHINE SULFATE (PF) 2 MG/ML SOLUTION: Performed by: SPECIALIST

## 2018-01-01 PROCEDURE — 82570 ASSAY OF URINE CREATININE: CPT | Performed by: NURSE PRACTITIONER

## 2018-01-01 PROCEDURE — 74176 CT ABD & PELVIS W/O CONTRAST: CPT

## 2018-01-01 PROCEDURE — 25010000002 ONDANSETRON PER 1 MG: Performed by: NURSE PRACTITIONER

## 2018-01-01 PROCEDURE — 94799 UNLISTED PULMONARY SVC/PX: CPT

## 2018-01-01 PROCEDURE — 83690 ASSAY OF LIPASE: CPT | Performed by: SPECIALIST

## 2018-01-01 PROCEDURE — 83516 IMMUNOASSAY NONANTIBODY: CPT | Performed by: INTERNAL MEDICINE

## 2018-01-01 PROCEDURE — 25010000002 ENOXAPARIN PER 10 MG: Performed by: NURSE PRACTITIONER

## 2018-01-01 PROCEDURE — 71045 X-RAY EXAM CHEST 1 VIEW: CPT

## 2018-01-01 PROCEDURE — 87040 BLOOD CULTURE FOR BACTERIA: CPT | Performed by: EMERGENCY MEDICINE

## 2018-01-01 PROCEDURE — 25010000002 ONDANSETRON PER 1 MG: Performed by: FAMILY MEDICINE

## 2018-01-01 PROCEDURE — 36415 COLL VENOUS BLD VENIPUNCTURE: CPT

## 2018-01-01 PROCEDURE — 87040 BLOOD CULTURE FOR BACTERIA: CPT | Performed by: FAMILY MEDICINE

## 2018-01-01 PROCEDURE — 96375 TX/PRO/DX INJ NEW DRUG ADDON: CPT

## 2018-01-01 PROCEDURE — 86334 IMMUNOFIX E-PHORESIS SERUM: CPT | Performed by: INTERNAL MEDICINE

## 2018-01-01 PROCEDURE — 99232 SBSQ HOSP IP/OBS MODERATE 35: CPT | Performed by: INTERNAL MEDICINE

## 2018-01-01 PROCEDURE — 93005 ELECTROCARDIOGRAM TRACING: CPT | Performed by: EMERGENCY MEDICINE

## 2018-01-01 PROCEDURE — 05HM33Z INSERTION OF INFUSION DEVICE INTO RIGHT INTERNAL JUGULAR VEIN, PERCUTANEOUS APPROACH: ICD-10-PCS | Performed by: SURGERY

## 2018-01-01 PROCEDURE — 99284 EMERGENCY DEPT VISIT MOD MDM: CPT

## 2018-01-01 PROCEDURE — 99152 MOD SED SAME PHYS/QHP 5/>YRS: CPT | Performed by: INTERNAL MEDICINE

## 2018-01-01 PROCEDURE — 0 IOPAMIDOL PER 1 ML: Performed by: EMERGENCY MEDICINE

## 2018-01-01 PROCEDURE — 93005 ELECTROCARDIOGRAM TRACING: CPT | Performed by: INTERNAL MEDICINE

## 2018-01-01 PROCEDURE — 99214 OFFICE O/P EST MOD 30 MIN: CPT | Performed by: INTERNAL MEDICINE

## 2018-01-01 PROCEDURE — 25010000002 CEFTRIAXONE PER 250 MG: Performed by: NURSE PRACTITIONER

## 2018-01-01 PROCEDURE — 5A1D70Z PERFORMANCE OF URINARY FILTRATION, INTERMITTENT, LESS THAN 6 HOURS PER DAY: ICD-10-PCS | Performed by: INTERNAL MEDICINE

## 2018-01-01 PROCEDURE — 25010000002 MORPHINE SULFATE (PF) 2 MG/ML SOLUTION: Performed by: ANESTHESIOLOGY

## 2018-01-01 PROCEDURE — 99233 SBSQ HOSP IP/OBS HIGH 50: CPT | Performed by: INTERNAL MEDICINE

## 2018-01-01 PROCEDURE — 25010000002 AMIODARONE IN DEXTROSE 5% 360-4.14 MG/200ML-% SOLUTION: Performed by: EMERGENCY MEDICINE

## 2018-01-01 PROCEDURE — 25010000002 CEFTRIAXONE PER 250 MG: Performed by: EMERGENCY MEDICINE

## 2018-01-01 PROCEDURE — 99223 1ST HOSP IP/OBS HIGH 75: CPT | Performed by: INTERNAL MEDICINE

## 2018-01-01 PROCEDURE — 87086 URINE CULTURE/COLONY COUNT: CPT | Performed by: EMERGENCY MEDICINE

## 2018-01-01 PROCEDURE — 25010000002 PROPOFOL 10 MG/ML EMULSION: Performed by: NURSE ANESTHETIST, CERTIFIED REGISTERED

## 2018-01-01 PROCEDURE — 86235 NUCLEAR ANTIGEN ANTIBODY: CPT | Performed by: INTERNAL MEDICINE

## 2018-01-01 PROCEDURE — 87086 URINE CULTURE/COLONY COUNT: CPT | Performed by: INTERNAL MEDICINE

## 2018-01-01 PROCEDURE — 97161 PT EVAL LOW COMPLEX 20 MIN: CPT

## 2018-01-01 PROCEDURE — 82803 BLOOD GASES ANY COMBINATION: CPT

## 2018-01-01 PROCEDURE — 83605 ASSAY OF LACTIC ACID: CPT | Performed by: INTERNAL MEDICINE

## 2018-01-01 PROCEDURE — 85025 COMPLETE CBC W/AUTO DIFF WBC: CPT | Performed by: INTERNAL MEDICINE

## 2018-01-01 PROCEDURE — 84484 ASSAY OF TROPONIN QUANT: CPT | Performed by: EMERGENCY MEDICINE

## 2018-01-01 PROCEDURE — 80053 COMPREHEN METABOLIC PANEL: CPT | Performed by: INTERNAL MEDICINE

## 2018-01-01 PROCEDURE — 83540 ASSAY OF IRON: CPT | Performed by: INTERNAL MEDICINE

## 2018-01-01 PROCEDURE — 83690 ASSAY OF LIPASE: CPT | Performed by: EMERGENCY MEDICINE

## 2018-01-01 PROCEDURE — 84156 ASSAY OF PROTEIN URINE: CPT | Performed by: NURSE PRACTITIONER

## 2018-01-01 PROCEDURE — 93000 ELECTROCARDIOGRAM COMPLETE: CPT | Performed by: INTERNAL MEDICINE

## 2018-01-01 PROCEDURE — 85025 COMPLETE CBC W/AUTO DIFF WBC: CPT | Performed by: FAMILY MEDICINE

## 2018-01-01 PROCEDURE — 81001 URINALYSIS AUTO W/SCOPE: CPT | Performed by: FAMILY MEDICINE

## 2018-01-01 PROCEDURE — 86256 FLUORESCENT ANTIBODY TITER: CPT | Performed by: INTERNAL MEDICINE

## 2018-01-01 PROCEDURE — 25010000002 MIDAZOLAM PER 1 MG: Performed by: ANESTHESIOLOGY

## 2018-01-01 PROCEDURE — 80076 HEPATIC FUNCTION PANEL: CPT | Performed by: INTERNAL MEDICINE

## 2018-01-01 PROCEDURE — 85610 PROTHROMBIN TIME: CPT | Performed by: EMERGENCY MEDICINE

## 2018-01-01 PROCEDURE — G0378 HOSPITAL OBSERVATION PER HR: HCPCS

## 2018-01-01 PROCEDURE — 25010000002 DEXAMETHASONE PER 1 MG: Performed by: FAMILY MEDICINE

## 2018-01-01 PROCEDURE — 83550 IRON BINDING TEST: CPT | Performed by: INTERNAL MEDICINE

## 2018-01-01 PROCEDURE — 80074 ACUTE HEPATITIS PANEL: CPT | Performed by: INTERNAL MEDICINE

## 2018-01-01 PROCEDURE — 99213 OFFICE O/P EST LOW 20 MIN: CPT | Performed by: SURGERY

## 2018-01-01 PROCEDURE — 99222 1ST HOSP IP/OBS MODERATE 55: CPT | Performed by: SURGERY

## 2018-01-01 PROCEDURE — 84145 PROCALCITONIN (PCT): CPT | Performed by: EMERGENCY MEDICINE

## 2018-01-01 PROCEDURE — 86160 COMPLEMENT ANTIGEN: CPT | Performed by: INTERNAL MEDICINE

## 2018-01-01 PROCEDURE — 97110 THERAPEUTIC EXERCISES: CPT

## 2018-01-01 PROCEDURE — 85027 COMPLETE CBC AUTOMATED: CPT | Performed by: INTERNAL MEDICINE

## 2018-01-01 PROCEDURE — 25010000002 FUROSEMIDE PER 20 MG: Performed by: INTERNAL MEDICINE

## 2018-01-01 PROCEDURE — 83735 ASSAY OF MAGNESIUM: CPT | Performed by: NURSE PRACTITIONER

## 2018-01-01 PROCEDURE — 97116 GAIT TRAINING THERAPY: CPT

## 2018-01-01 PROCEDURE — 25010000002 ONDANSETRON PER 1 MG: Performed by: ANESTHESIOLOGY

## 2018-01-01 PROCEDURE — 25010000002 LORAZEPAM PER 2 MG: Performed by: EMERGENCY MEDICINE

## 2018-01-01 PROCEDURE — 36600 WITHDRAWAL OF ARTERIAL BLOOD: CPT

## 2018-01-01 PROCEDURE — 81001 URINALYSIS AUTO W/SCOPE: CPT | Performed by: NURSE PRACTITIONER

## 2018-01-01 PROCEDURE — 93451 RIGHT HEART CATH: CPT | Performed by: INTERNAL MEDICINE

## 2018-01-01 PROCEDURE — 4A133B3 MONITORING OF ARTERIAL PRESSURE, PULMONARY, PERCUTANEOUS APPROACH: ICD-10-PCS | Performed by: INTERNAL MEDICINE

## 2018-01-01 PROCEDURE — 4A1239Z MONITORING OF CARDIAC OUTPUT, PERCUTANEOUS APPROACH: ICD-10-PCS | Performed by: INTERNAL MEDICINE

## 2018-01-01 PROCEDURE — 82150 ASSAY OF AMYLASE: CPT | Performed by: SPECIALIST

## 2018-01-01 PROCEDURE — 84550 ASSAY OF BLOOD/URIC ACID: CPT | Performed by: INTERNAL MEDICINE

## 2018-01-01 PROCEDURE — 25010000002 MIDAZOLAM PER 1 MG: Performed by: INTERNAL MEDICINE

## 2018-01-01 PROCEDURE — 83880 ASSAY OF NATRIURETIC PEPTIDE: CPT | Performed by: NURSE PRACTITIONER

## 2018-01-01 PROCEDURE — 25010000002 ENOXAPARIN PER 10 MG: Performed by: FAMILY MEDICINE

## 2018-01-01 PROCEDURE — 80048 BASIC METABOLIC PNL TOTAL CA: CPT | Performed by: ANESTHESIOLOGY

## 2018-01-01 PROCEDURE — 82306 VITAMIN D 25 HYDROXY: CPT | Performed by: INTERNAL MEDICINE

## 2018-01-01 PROCEDURE — 25010000002 PIPERACILLIN SOD-TAZOBACTAM PER 1 G: Performed by: EMERGENCY MEDICINE

## 2018-01-01 PROCEDURE — 93306 TTE W/DOPPLER COMPLETE: CPT | Performed by: INTERNAL MEDICINE

## 2018-01-01 PROCEDURE — 85379 FIBRIN DEGRADATION QUANT: CPT | Performed by: EMERGENCY MEDICINE

## 2018-01-01 PROCEDURE — 82150 ASSAY OF AMYLASE: CPT | Performed by: EMERGENCY MEDICINE

## 2018-01-01 PROCEDURE — 63710000001 PROMETHAZINE PER 25 MG: Performed by: INTERNAL MEDICINE

## 2018-01-01 PROCEDURE — 99232 SBSQ HOSP IP/OBS MODERATE 35: CPT | Performed by: NURSE PRACTITIONER

## 2018-01-01 PROCEDURE — 25010000002 HEPARIN (PORCINE) PER 1000 UNITS: Performed by: NURSE PRACTITIONER

## 2018-01-01 PROCEDURE — 99024 POSTOP FOLLOW-UP VISIT: CPT | Performed by: SURGERY

## 2018-01-01 PROCEDURE — 84132 ASSAY OF SERUM POTASSIUM: CPT | Performed by: SPECIALIST

## 2018-01-01 PROCEDURE — 82962 GLUCOSE BLOOD TEST: CPT

## 2018-01-01 PROCEDURE — 76705 ECHO EXAM OF ABDOMEN: CPT

## 2018-01-01 PROCEDURE — 25010000002 MIDAZOLAM PER 1 MG: Performed by: NURSE ANESTHETIST, CERTIFIED REGISTERED

## 2018-01-01 PROCEDURE — 83970 ASSAY OF PARATHORMONE: CPT | Performed by: INTERNAL MEDICINE

## 2018-01-01 PROCEDURE — 84300 ASSAY OF URINE SODIUM: CPT | Performed by: EMERGENCY MEDICINE

## 2018-01-01 PROCEDURE — 25010000002 LIDOCAINE PER 10 MG: Performed by: EMERGENCY MEDICINE

## 2018-01-01 PROCEDURE — 25010000003 POTASSIUM CHLORIDE 10 MEQ/100ML SOLUTION: Performed by: INTERNAL MEDICINE

## 2018-01-01 PROCEDURE — 82820 HEMOGLOBIN-OXYGEN AFFINITY: CPT

## 2018-01-01 PROCEDURE — 25010000002 FENTANYL CITRATE (PF) 100 MCG/2ML SOLUTION: Performed by: INTERNAL MEDICINE

## 2018-01-01 PROCEDURE — 84100 ASSAY OF PHOSPHORUS: CPT | Performed by: SPECIALIST

## 2018-01-01 PROCEDURE — 93503 INSERT/PLACE HEART CATHETER: CPT | Performed by: INTERNAL MEDICINE

## 2018-01-01 PROCEDURE — 25010000002 DEXAMETHASONE PER 1 MG: Performed by: NURSE ANESTHETIST, CERTIFIED REGISTERED

## 2018-01-01 PROCEDURE — 82805 BLOOD GASES W/O2 SATURATION: CPT

## 2018-01-01 PROCEDURE — 86706 HEP B SURFACE ANTIBODY: CPT | Performed by: INTERNAL MEDICINE

## 2018-01-01 PROCEDURE — 0JH63XZ INSERTION OF TUNNELED VASCULAR ACCESS DEVICE INTO CHEST SUBCUTANEOUS TISSUE AND FASCIA, PERCUTANEOUS APPROACH: ICD-10-PCS | Performed by: SURGERY

## 2018-01-01 PROCEDURE — 99283 EMERGENCY DEPT VISIT LOW MDM: CPT

## 2018-01-01 PROCEDURE — 36558 INSERT TUNNELED CV CATH: CPT | Performed by: SURGERY

## 2018-01-01 PROCEDURE — 87086 URINE CULTURE/COLONY COUNT: CPT | Performed by: FAMILY MEDICINE

## 2018-01-01 PROCEDURE — 83735 ASSAY OF MAGNESIUM: CPT | Performed by: INTERNAL MEDICINE

## 2018-01-01 PROCEDURE — 25010000002 ONDANSETRON PER 1 MG: Performed by: EMERGENCY MEDICINE

## 2018-01-01 PROCEDURE — 83880 ASSAY OF NATRIURETIC PEPTIDE: CPT | Performed by: EMERGENCY MEDICINE

## 2018-01-01 PROCEDURE — 25010000003 POTASSIUM CHLORIDE 10 MEQ/100ML SOLUTION: Performed by: EMERGENCY MEDICINE

## 2018-01-01 PROCEDURE — 25010000002 AMIODARONE IN DEXTROSE 5% 360-4.14 MG/200ML-% SOLUTION: Performed by: INTERNAL MEDICINE

## 2018-01-01 PROCEDURE — 0FT44ZZ RESECTION OF GALLBLADDER, PERCUTANEOUS ENDOSCOPIC APPROACH: ICD-10-PCS | Performed by: SPECIALIST

## 2018-01-01 PROCEDURE — 02HP32Z INSERTION OF MONITORING DEVICE INTO PULMONARY TRUNK, PERCUTANEOUS APPROACH: ICD-10-PCS | Performed by: INTERNAL MEDICINE

## 2018-01-01 PROCEDURE — 84132 ASSAY OF SERUM POTASSIUM: CPT | Performed by: FAMILY MEDICINE

## 2018-01-01 PROCEDURE — 99285 EMERGENCY DEPT VISIT HI MDM: CPT

## 2018-01-01 PROCEDURE — 36558 INSERT TUNNELED CV CATH: CPT

## 2018-01-01 PROCEDURE — 86225 DNA ANTIBODY NATIVE: CPT | Performed by: INTERNAL MEDICINE

## 2018-01-01 PROCEDURE — 25010000002 IOPAMIDOL 61 % SOLUTION: Performed by: SPECIALIST

## 2018-01-01 PROCEDURE — C1894 INTRO/SHEATH, NON-LASER: HCPCS | Performed by: SURGERY

## 2018-01-01 PROCEDURE — 80053 COMPREHEN METABOLIC PANEL: CPT | Performed by: NURSE PRACTITIONER

## 2018-01-01 PROCEDURE — 97162 PT EVAL MOD COMPLEX 30 MIN: CPT

## 2018-01-01 PROCEDURE — 83605 ASSAY OF LACTIC ACID: CPT | Performed by: SPECIALIST

## 2018-01-01 PROCEDURE — 84443 ASSAY THYROID STIM HORMONE: CPT | Performed by: EMERGENCY MEDICINE

## 2018-01-01 PROCEDURE — 88304 TISSUE EXAM BY PATHOLOGIST: CPT | Performed by: SPECIALIST

## 2018-01-01 PROCEDURE — 97530 THERAPEUTIC ACTIVITIES: CPT

## 2018-01-01 PROCEDURE — 25010000002 FENTANYL CITRATE (PF) 100 MCG/2ML SOLUTION: Performed by: NURSE ANESTHETIST, CERTIFIED REGISTERED

## 2018-01-01 PROCEDURE — 84100 ASSAY OF PHOSPHORUS: CPT | Performed by: INTERNAL MEDICINE

## 2018-01-01 PROCEDURE — 84540 ASSAY OF URINE/UREA-N: CPT | Performed by: NURSE PRACTITIONER

## 2018-01-01 PROCEDURE — 94762 N-INVAS EAR/PLS OXIMTRY CONT: CPT

## 2018-01-01 PROCEDURE — 85025 COMPLETE CBC W/AUTO DIFF WBC: CPT

## 2018-01-01 PROCEDURE — 76000 FLUOROSCOPY <1 HR PHYS/QHP: CPT

## 2018-01-01 PROCEDURE — G8978 MOBILITY CURRENT STATUS: HCPCS

## 2018-01-01 PROCEDURE — 80053 COMPREHEN METABOLIC PANEL: CPT | Performed by: FAMILY MEDICINE

## 2018-01-01 PROCEDURE — 76937 US GUIDE VASCULAR ACCESS: CPT | Performed by: SURGERY

## 2018-01-01 PROCEDURE — C1894 INTRO/SHEATH, NON-LASER: HCPCS | Performed by: SPECIALIST

## 2018-01-01 PROCEDURE — 82784 ASSAY IGA/IGD/IGG/IGM EACH: CPT | Performed by: INTERNAL MEDICINE

## 2018-01-01 PROCEDURE — 85730 THROMBOPLASTIN TIME PARTIAL: CPT | Performed by: EMERGENCY MEDICINE

## 2018-01-01 PROCEDURE — 25010000002 NEOSTIGMINE PER 0.5 MG: Performed by: NURSE ANESTHETIST, CERTIFIED REGISTERED

## 2018-01-01 PROCEDURE — 25010000002 AMIODARONE IN DEXTROSE 5% 150-4.21 MG/100ML-% SOLUTION: Performed by: EMERGENCY MEDICINE

## 2018-01-01 PROCEDURE — 25010000002 FUROSEMIDE PER 20 MG: Performed by: SPECIALIST

## 2018-01-01 PROCEDURE — 99231 SBSQ HOSP IP/OBS SF/LOW 25: CPT | Performed by: INTERNAL MEDICINE

## 2018-01-01 PROCEDURE — 85007 BL SMEAR W/DIFF WBC COUNT: CPT | Performed by: INTERNAL MEDICINE

## 2018-01-01 PROCEDURE — 96376 TX/PRO/DX INJ SAME DRUG ADON: CPT

## 2018-01-01 PROCEDURE — 25010000002 NALOXONE PER 1 MG: Performed by: NURSE ANESTHETIST, CERTIFIED REGISTERED

## 2018-01-01 PROCEDURE — 85025 COMPLETE CBC W/AUTO DIFF WBC: CPT | Performed by: NURSE PRACTITIONER

## 2018-01-01 PROCEDURE — 25010000002 FUROSEMIDE PER 20 MG: Performed by: EMERGENCY MEDICINE

## 2018-01-01 PROCEDURE — 84300 ASSAY OF URINE SODIUM: CPT | Performed by: NURSE PRACTITIONER

## 2018-01-01 PROCEDURE — 25010000002 FUROSEMIDE PER 20 MG: Performed by: NURSE PRACTITIONER

## 2018-01-01 PROCEDURE — 93306 TTE W/DOPPLER COMPLETE: CPT

## 2018-01-01 PROCEDURE — 83735 ASSAY OF MAGNESIUM: CPT | Performed by: FAMILY MEDICINE

## 2018-01-01 PROCEDURE — 25010000002 AMIODARONE IN DEXTROSE 5% 150-4.21 MG/100ML-% SOLUTION: Performed by: INTERNAL MEDICINE

## 2018-01-01 PROCEDURE — 80048 BASIC METABOLIC PNL TOTAL CA: CPT | Performed by: INTERNAL MEDICINE

## 2018-01-01 PROCEDURE — 25010000002 HEPARIN (PORCINE) PER 1000 UNITS: Performed by: SURGERY

## 2018-01-01 PROCEDURE — 87350 HEPATITIS BE AG IA: CPT | Performed by: INTERNAL MEDICINE

## 2018-01-01 PROCEDURE — 71275 CT ANGIOGRAPHY CHEST: CPT

## 2018-01-01 PROCEDURE — 83735 ASSAY OF MAGNESIUM: CPT | Performed by: EMERGENCY MEDICINE

## 2018-01-01 PROCEDURE — 96365 THER/PROPH/DIAG IV INF INIT: CPT

## 2018-01-01 PROCEDURE — 96374 THER/PROPH/DIAG INJ IV PUSH: CPT

## 2018-01-01 PROCEDURE — 83735 ASSAY OF MAGNESIUM: CPT | Performed by: SPECIALIST

## 2018-01-01 PROCEDURE — C1750 CATH, HEMODIALYSIS,LONG-TERM: HCPCS | Performed by: SURGERY

## 2018-01-01 RX ORDER — SODIUM CHLORIDE 0.9 % (FLUSH) 0.9 %
3 SYRINGE (ML) INJECTION AS NEEDED
Status: DISCONTINUED | OUTPATIENT
Start: 2018-01-01 | End: 2018-01-01 | Stop reason: HOSPADM

## 2018-01-01 RX ORDER — ISOSORBIDE DINITRATE 20 MG/1
20 TABLET ORAL EVERY 8 HOURS SCHEDULED
Status: DISCONTINUED | OUTPATIENT
Start: 2018-01-01 | End: 2018-01-01

## 2018-01-01 RX ORDER — MORPHINE SULFATE 4 MG/ML
4 INJECTION, SOLUTION INTRAMUSCULAR; INTRAVENOUS
Status: DISCONTINUED | OUTPATIENT
Start: 2018-01-01 | End: 2018-01-01

## 2018-01-01 RX ORDER — ONDANSETRON 4 MG/1
4 TABLET, FILM COATED ORAL EVERY 6 HOURS PRN
Status: DISCONTINUED | OUTPATIENT
Start: 2018-01-01 | End: 2018-01-01 | Stop reason: HOSPADM

## 2018-01-01 RX ORDER — AMIODARONE HYDROCHLORIDE 200 MG/1
200 TABLET ORAL EVERY 12 HOURS SCHEDULED
Status: DISCONTINUED | OUTPATIENT
Start: 2018-01-01 | End: 2018-01-01 | Stop reason: HOSPADM

## 2018-01-01 RX ORDER — IBUPROFEN 600 MG/1
600 TABLET ORAL ONCE AS NEEDED
Status: CANCELLED | OUTPATIENT
Start: 2018-01-01

## 2018-01-01 RX ORDER — AMIODARONE HYDROCHLORIDE 200 MG/1
200 TABLET ORAL
Status: DISCONTINUED | OUTPATIENT
Start: 2018-01-01 | End: 2018-01-01 | Stop reason: HOSPADM

## 2018-01-01 RX ORDER — POTASSIUM CHLORIDE 750 MG/1
20 TABLET, FILM COATED, EXTENDED RELEASE ORAL 2 TIMES DAILY
COMMUNITY
End: 2018-01-01

## 2018-01-01 RX ORDER — HYDROCODONE BITARTRATE AND ACETAMINOPHEN 7.5; 325 MG/1; MG/1
1 TABLET ORAL EVERY 6 HOURS PRN
Status: DISCONTINUED | OUTPATIENT
Start: 2018-01-01 | End: 2018-01-01 | Stop reason: HOSPADM

## 2018-01-01 RX ORDER — SODIUM CHLORIDE 9 MG/ML
75 INJECTION, SOLUTION INTRAVENOUS CONTINUOUS
Status: DISCONTINUED | OUTPATIENT
Start: 2018-01-01 | End: 2018-01-01

## 2018-01-01 RX ORDER — ONDANSETRON 2 MG/ML
4 INJECTION INTRAMUSCULAR; INTRAVENOUS ONCE
Status: COMPLETED | OUTPATIENT
Start: 2018-01-01 | End: 2018-01-01

## 2018-01-01 RX ORDER — DOBUTAMINE HYDROCHLORIDE 100 MG/100ML
INJECTION INTRAVENOUS
Status: DISPENSED
Start: 2018-01-01 | End: 2018-01-01

## 2018-01-01 RX ORDER — HYDROCODONE BITARTRATE AND ACETAMINOPHEN 5; 325 MG/1; MG/1
1 TABLET ORAL ONCE
Status: COMPLETED | OUTPATIENT
Start: 2018-01-01 | End: 2018-01-01

## 2018-01-01 RX ORDER — FAMOTIDINE 20 MG/1
20 TABLET, FILM COATED ORAL NIGHTLY
Status: DISCONTINUED | OUTPATIENT
Start: 2018-01-01 | End: 2018-01-01

## 2018-01-01 RX ORDER — FAMOTIDINE 20 MG/1
20 TABLET, FILM COATED ORAL 2 TIMES DAILY
Status: DISCONTINUED | OUTPATIENT
Start: 2018-01-01 | End: 2018-01-01

## 2018-01-01 RX ORDER — MAGNESIUM SULFATE HEPTAHYDRATE 40 MG/ML
2 INJECTION, SOLUTION INTRAVENOUS AS NEEDED
Status: DISCONTINUED | OUTPATIENT
Start: 2018-01-01 | End: 2018-01-01 | Stop reason: HOSPADM

## 2018-01-01 RX ORDER — METOLAZONE 2.5 MG/1
2.5 TABLET ORAL DAILY PRN
COMMUNITY
End: 2018-01-01 | Stop reason: HOSPADM

## 2018-01-01 RX ORDER — MORPHINE SULFATE 2 MG/ML
2 INJECTION, SOLUTION INTRAMUSCULAR; INTRAVENOUS
Status: DISCONTINUED | OUTPATIENT
Start: 2018-01-01 | End: 2018-01-01 | Stop reason: HOSPADM

## 2018-01-01 RX ORDER — LABETALOL HYDROCHLORIDE 5 MG/ML
5 INJECTION, SOLUTION INTRAVENOUS
Status: DISCONTINUED | OUTPATIENT
Start: 2018-01-01 | End: 2018-01-01

## 2018-01-01 RX ORDER — HYDRALAZINE HYDROCHLORIDE 10 MG/1
10 TABLET, FILM COATED ORAL EVERY 8 HOURS SCHEDULED
Status: DISCONTINUED | OUTPATIENT
Start: 2018-01-01 | End: 2018-01-01

## 2018-01-01 RX ORDER — AMIODARONE HYDROCHLORIDE 200 MG/1
200 TABLET ORAL DAILY
Qty: 30 TABLET | Refills: 11 | Status: ON HOLD | OUTPATIENT
Start: 2018-01-01 | End: 2018-01-01

## 2018-01-01 RX ORDER — LIDOCAINE HYDROCHLORIDE 20 MG/ML
INJECTION, SOLUTION INFILTRATION; PERINEURAL AS NEEDED
Status: DISCONTINUED | OUTPATIENT
Start: 2018-01-01 | End: 2018-01-01 | Stop reason: SURG

## 2018-01-01 RX ORDER — FENTANYL CITRATE 50 UG/ML
25 INJECTION, SOLUTION INTRAMUSCULAR; INTRAVENOUS AS NEEDED
Status: CANCELLED | OUTPATIENT
Start: 2018-01-01

## 2018-01-01 RX ORDER — ONDANSETRON 4 MG/1
4 TABLET, ORALLY DISINTEGRATING ORAL EVERY 4 HOURS PRN
Qty: 10 TABLET | Refills: 0 | Status: SHIPPED | OUTPATIENT
Start: 2018-01-01 | End: 2018-01-01

## 2018-01-01 RX ORDER — POTASSIUM CHLORIDE 750 MG/1
40 CAPSULE, EXTENDED RELEASE ORAL ONCE
Status: COMPLETED | OUTPATIENT
Start: 2018-01-01 | End: 2018-01-01

## 2018-01-01 RX ORDER — ALLOPURINOL 100 MG/1
100 TABLET ORAL DAILY
Status: ON HOLD | COMMUNITY
End: 2018-01-01

## 2018-01-01 RX ORDER — ONDANSETRON 4 MG/1
4 TABLET, ORALLY DISINTEGRATING ORAL EVERY 6 HOURS PRN
Status: DISCONTINUED | OUTPATIENT
Start: 2018-01-01 | End: 2018-01-01 | Stop reason: HOSPADM

## 2018-01-01 RX ORDER — ACETAMINOPHEN 500 MG
1000 TABLET ORAL ONCE
Status: COMPLETED | OUTPATIENT
Start: 2018-01-01 | End: 2018-01-01

## 2018-01-01 RX ORDER — FENTANYL CITRATE 50 UG/ML
25 INJECTION, SOLUTION INTRAMUSCULAR; INTRAVENOUS
Status: DISCONTINUED | OUTPATIENT
Start: 2018-01-01 | End: 2018-01-01 | Stop reason: HOSPADM

## 2018-01-01 RX ORDER — SODIUM CHLORIDE 0.9 % (FLUSH) 0.9 %
1-10 SYRINGE (ML) INJECTION AS NEEDED
Status: DISCONTINUED | OUTPATIENT
Start: 2018-01-01 | End: 2018-01-01 | Stop reason: HOSPADM

## 2018-01-01 RX ORDER — NALOXONE HCL 0.4 MG/ML
0.4 VIAL (ML) INJECTION AS NEEDED
Status: DISCONTINUED | OUTPATIENT
Start: 2018-01-01 | End: 2018-01-01

## 2018-01-01 RX ORDER — FUROSEMIDE 40 MG/1
40 TABLET ORAL DAILY
Qty: 30 TABLET | Refills: 11 | Status: ON HOLD | OUTPATIENT
Start: 2018-01-01 | End: 2018-01-01

## 2018-01-01 RX ORDER — FAMOTIDINE 10 MG/ML
20 INJECTION, SOLUTION INTRAVENOUS EVERY 12 HOURS SCHEDULED
Status: DISCONTINUED | OUTPATIENT
Start: 2018-01-01 | End: 2018-01-01

## 2018-01-01 RX ORDER — ONDANSETRON 2 MG/ML
4 INJECTION INTRAMUSCULAR; INTRAVENOUS ONCE AS NEEDED
Status: DISCONTINUED | OUTPATIENT
Start: 2018-01-01 | End: 2018-01-01

## 2018-01-01 RX ORDER — DEXAMETHASONE SODIUM PHOSPHATE 4 MG/ML
4 INJECTION, SOLUTION INTRA-ARTICULAR; INTRALESIONAL; INTRAMUSCULAR; INTRAVENOUS; SOFT TISSUE ONCE
Status: COMPLETED | OUTPATIENT
Start: 2018-01-01 | End: 2018-01-01

## 2018-01-01 RX ORDER — METOLAZONE 2.5 MG/1
2.5 TABLET ORAL DAILY PRN
Status: DISCONTINUED | OUTPATIENT
Start: 2018-01-01 | End: 2018-01-01

## 2018-01-01 RX ORDER — METOLAZONE 2.5 MG/1
2.5 TABLET ORAL
Qty: 30 TABLET | Refills: 11 | Status: SHIPPED | OUTPATIENT
Start: 2018-01-01 | End: 2018-01-01 | Stop reason: HOSPADM

## 2018-01-01 RX ORDER — FUROSEMIDE 40 MG/1
40 TABLET ORAL DAILY PRN
Qty: 30 TABLET | Refills: 11 | Status: SHIPPED | OUTPATIENT
Start: 2018-01-01 | End: 2018-01-01

## 2018-01-01 RX ORDER — ONDANSETRON 2 MG/ML
4 INJECTION INTRAMUSCULAR; INTRAVENOUS EVERY 6 HOURS PRN
Status: DISCONTINUED | OUTPATIENT
Start: 2018-01-01 | End: 2018-01-01 | Stop reason: HOSPADM

## 2018-01-01 RX ORDER — LIDOCAINE HYDROCHLORIDE ANHYDROUS AND DEXTROSE MONOHYDRATE 5; 400 G/100ML; MG/100ML
2 INJECTION, SOLUTION INTRAVENOUS CONTINUOUS
Status: DISCONTINUED | OUTPATIENT
Start: 2018-01-01 | End: 2018-01-01 | Stop reason: HOSPADM

## 2018-01-01 RX ORDER — BUMETANIDE 1 MG/1
1 TABLET ORAL EVERY 12 HOURS PRN
Status: DISCONTINUED | OUTPATIENT
Start: 2018-01-01 | End: 2018-01-01

## 2018-01-01 RX ORDER — MIDAZOLAM HYDROCHLORIDE 1 MG/ML
INJECTION INTRAMUSCULAR; INTRAVENOUS AS NEEDED
Status: DISCONTINUED | OUTPATIENT
Start: 2018-01-01 | End: 2018-01-01 | Stop reason: HOSPADM

## 2018-01-01 RX ORDER — BUMETANIDE 0.25 MG/ML
1 INJECTION INTRAMUSCULAR; INTRAVENOUS 2 TIMES DAILY
Status: DISCONTINUED | OUTPATIENT
Start: 2018-01-01 | End: 2018-01-01

## 2018-01-01 RX ORDER — ALLOPURINOL 100 MG/1
100 TABLET ORAL DAILY
COMMUNITY
End: 2018-01-01 | Stop reason: HOSPADM

## 2018-01-01 RX ORDER — HYDRALAZINE HYDROCHLORIDE 25 MG/1
25 TABLET, FILM COATED ORAL EVERY 8 HOURS SCHEDULED
Status: DISCONTINUED | OUTPATIENT
Start: 2018-01-01 | End: 2018-01-01

## 2018-01-01 RX ORDER — SODIUM CHLORIDE 9 MG/ML
50 INJECTION, SOLUTION INTRAVENOUS CONTINUOUS
Status: DISCONTINUED | OUTPATIENT
Start: 2018-01-01 | End: 2018-01-01

## 2018-01-01 RX ORDER — POTASSIUM CHLORIDE 750 MG/1
40 CAPSULE, EXTENDED RELEASE ORAL EVERY 6 HOURS
Status: DISPENSED | OUTPATIENT
Start: 2018-01-01 | End: 2018-01-01

## 2018-01-01 RX ORDER — DOCUSATE SODIUM 100 MG/1
100 CAPSULE, LIQUID FILLED ORAL 2 TIMES DAILY
Status: DISCONTINUED | OUTPATIENT
Start: 2018-01-01 | End: 2018-01-01 | Stop reason: HOSPADM

## 2018-01-01 RX ORDER — FAMOTIDINE 20 MG/1
20 TABLET, FILM COATED ORAL DAILY
Status: DISCONTINUED | OUTPATIENT
Start: 2018-01-01 | End: 2018-01-01

## 2018-01-01 RX ORDER — DIPHENHYDRAMINE HYDROCHLORIDE 50 MG/ML
12.5 INJECTION INTRAMUSCULAR; INTRAVENOUS
Status: DISCONTINUED | OUTPATIENT
Start: 2018-01-01 | End: 2018-01-01 | Stop reason: HOSPADM

## 2018-01-01 RX ORDER — AMOXICILLIN AND CLAVULANATE POTASSIUM 875; 125 MG/1; MG/1
1 TABLET, FILM COATED ORAL EVERY 12 HOURS SCHEDULED
Qty: 14 TABLET | Refills: 0 | Status: SHIPPED | OUTPATIENT
Start: 2018-01-01 | End: 2018-01-01

## 2018-01-01 RX ORDER — IBUPROFEN 600 MG/1
600 TABLET ORAL ONCE AS NEEDED
Status: DISCONTINUED | OUTPATIENT
Start: 2018-01-01 | End: 2018-01-01

## 2018-01-01 RX ORDER — ACETAMINOPHEN 325 MG/1
650 TABLET ORAL EVERY 4 HOURS PRN
Status: DISCONTINUED | OUTPATIENT
Start: 2018-01-01 | End: 2018-01-01 | Stop reason: HOSPADM

## 2018-01-01 RX ORDER — CEPHALEXIN 500 MG/1
500 CAPSULE ORAL 3 TIMES DAILY
Qty: 30 CAPSULE | Refills: 0 | Status: SHIPPED | OUTPATIENT
Start: 2018-01-01 | End: 2018-01-01

## 2018-01-01 RX ORDER — MEPERIDINE HYDROCHLORIDE 25 MG/ML
12.5 INJECTION INTRAMUSCULAR; INTRAVENOUS; SUBCUTANEOUS
Status: DISCONTINUED | OUTPATIENT
Start: 2018-01-01 | End: 2018-01-01

## 2018-01-01 RX ORDER — MORPHINE SULFATE 2 MG/ML
2 INJECTION, SOLUTION INTRAMUSCULAR; INTRAVENOUS
Status: DISCONTINUED | OUTPATIENT
Start: 2018-01-01 | End: 2018-01-01

## 2018-01-01 RX ORDER — HYDRALAZINE HYDROCHLORIDE 25 MG/1
75 TABLET, FILM COATED ORAL EVERY 8 HOURS SCHEDULED
Qty: 90 TABLET | Refills: 11 | Status: SHIPPED | OUTPATIENT
Start: 2018-01-01 | End: 2018-01-01 | Stop reason: SDUPTHER

## 2018-01-01 RX ORDER — MEPERIDINE HYDROCHLORIDE 50 MG/ML
12.5 INJECTION INTRAMUSCULAR; INTRAVENOUS; SUBCUTANEOUS
Status: CANCELLED | OUTPATIENT
Start: 2018-01-01 | End: 2018-01-01

## 2018-01-01 RX ORDER — SODIUM CHLORIDE 0.9 % (FLUSH) 0.9 %
10 SYRINGE (ML) INJECTION AS NEEDED
Status: DISCONTINUED | OUTPATIENT
Start: 2018-01-01 | End: 2018-01-01 | Stop reason: HOSPADM

## 2018-01-01 RX ORDER — ONDANSETRON 2 MG/ML
4 INJECTION INTRAMUSCULAR; INTRAVENOUS EVERY 4 HOURS PRN
Status: DISCONTINUED | OUTPATIENT
Start: 2018-01-01 | End: 2018-01-01 | Stop reason: HOSPADM

## 2018-01-01 RX ORDER — AMIODARONE HYDROCHLORIDE 200 MG/1
200 TABLET ORAL 2 TIMES DAILY
Qty: 30 TABLET | Refills: 11 | Status: SHIPPED | OUTPATIENT
Start: 2018-01-01 | End: 2019-01-01 | Stop reason: HOSPADM

## 2018-01-01 RX ORDER — HEPARIN SODIUM 5000 [USP'U]/ML
5000 INJECTION, SOLUTION INTRAVENOUS; SUBCUTANEOUS EVERY 8 HOURS SCHEDULED
Status: DISCONTINUED | OUTPATIENT
Start: 2018-01-01 | End: 2018-01-01 | Stop reason: HOSPADM

## 2018-01-01 RX ORDER — ONDANSETRON 2 MG/ML
4 INJECTION INTRAMUSCULAR; INTRAVENOUS ONCE AS NEEDED
Status: COMPLETED | OUTPATIENT
Start: 2018-01-01 | End: 2018-01-01

## 2018-01-01 RX ORDER — ONDANSETRON 4 MG/1
4 TABLET, FILM COATED ORAL EVERY 6 HOURS PRN
Status: DISCONTINUED | OUTPATIENT
Start: 2018-01-01 | End: 2018-01-01

## 2018-01-01 RX ORDER — ISOSORBIDE DINITRATE 20 MG/1
20 TABLET ORAL
Status: DISCONTINUED | OUTPATIENT
Start: 2018-01-01 | End: 2018-01-01

## 2018-01-01 RX ORDER — POTASSIUM CHLORIDE 750 MG/1
40 CAPSULE, EXTENDED RELEASE ORAL AS NEEDED
Status: DISCONTINUED | OUTPATIENT
Start: 2018-01-01 | End: 2018-01-01 | Stop reason: HOSPADM

## 2018-01-01 RX ORDER — VASOPRESSIN 20 U/ML
INJECTION PARENTERAL AS NEEDED
Status: DISCONTINUED | OUTPATIENT
Start: 2018-01-01 | End: 2018-01-01 | Stop reason: SURG

## 2018-01-01 RX ORDER — DEXTROSE MONOHYDRATE 25 G/50ML
12.5 INJECTION, SOLUTION INTRAVENOUS AS NEEDED
Status: DISCONTINUED | OUTPATIENT
Start: 2018-01-01 | End: 2018-01-01 | Stop reason: HOSPADM

## 2018-01-01 RX ORDER — ISOSORBIDE MONONITRATE 120 MG/1
120 TABLET, EXTENDED RELEASE ORAL DAILY
Qty: 30 TABLET | Refills: 11 | Status: SHIPPED | OUTPATIENT
Start: 2018-01-01 | End: 2018-01-01 | Stop reason: HOSPADM

## 2018-01-01 RX ORDER — METOPROLOL TARTRATE 5 MG/5ML
2.5 INJECTION INTRAVENOUS
Status: DISCONTINUED | OUTPATIENT
Start: 2018-01-01 | End: 2018-01-01 | Stop reason: HOSPADM

## 2018-01-01 RX ORDER — BUMETANIDE 1 MG/1
1 TABLET ORAL 2 TIMES DAILY
Status: DISCONTINUED | OUTPATIENT
Start: 2018-01-01 | End: 2018-01-01

## 2018-01-01 RX ORDER — POTASSIUM CHLORIDE 750 MG/1
20 CAPSULE, EXTENDED RELEASE ORAL 2 TIMES DAILY
Status: DISCONTINUED | OUTPATIENT
Start: 2018-01-01 | End: 2018-01-01 | Stop reason: HOSPADM

## 2018-01-01 RX ORDER — SOTALOL HYDROCHLORIDE 80 MG/1
40 TABLET ORAL 2 TIMES DAILY
COMMUNITY
End: 2018-01-01 | Stop reason: HOSPADM

## 2018-01-01 RX ORDER — ISOSORBIDE MONONITRATE 120 MG/1
120 TABLET, EXTENDED RELEASE ORAL DAILY
Status: DISCONTINUED | OUTPATIENT
Start: 2018-01-01 | End: 2018-01-01 | Stop reason: HOSPADM

## 2018-01-01 RX ORDER — BUMETANIDE 1 MG/1
1 TABLET ORAL EVERY 12 HOURS PRN
Qty: 60 TABLET | Refills: 11 | Status: SHIPPED | OUTPATIENT
Start: 2018-01-01 | End: 2018-01-01 | Stop reason: HOSPADM

## 2018-01-01 RX ORDER — OXYCODONE AND ACETAMINOPHEN 7.5; 325 MG/1; MG/1
2 TABLET ORAL ONCE AS NEEDED
Status: CANCELLED | OUTPATIENT
Start: 2018-01-01

## 2018-01-01 RX ORDER — SODIUM CHLORIDE, SODIUM LACTATE, POTASSIUM CHLORIDE, CALCIUM CHLORIDE 600; 310; 30; 20 MG/100ML; MG/100ML; MG/100ML; MG/100ML
9 INJECTION, SOLUTION INTRAVENOUS CONTINUOUS
Status: DISCONTINUED | OUTPATIENT
Start: 2018-01-01 | End: 2018-01-01 | Stop reason: HOSPADM

## 2018-01-01 RX ORDER — SOTALOL HYDROCHLORIDE 80 MG/1
40 TABLET ORAL
Status: DISCONTINUED | OUTPATIENT
Start: 2018-01-01 | End: 2018-01-01

## 2018-01-01 RX ORDER — LORAZEPAM 2 MG/ML
1 INJECTION INTRAMUSCULAR ONCE
Status: COMPLETED | OUTPATIENT
Start: 2018-01-01 | End: 2018-01-01

## 2018-01-01 RX ORDER — LIDOCAINE HYDROCHLORIDE 20 MG/ML
INJECTION, SOLUTION INFILTRATION; PERINEURAL AS NEEDED
Status: DISCONTINUED | OUTPATIENT
Start: 2018-01-01 | End: 2018-01-01 | Stop reason: HOSPADM

## 2018-01-01 RX ORDER — POTASSIUM CHLORIDE 1.5 G/1.77G
40 POWDER, FOR SOLUTION ORAL AS NEEDED
Status: DISCONTINUED | OUTPATIENT
Start: 2018-01-01 | End: 2018-01-01

## 2018-01-01 RX ORDER — FUROSEMIDE 10 MG/ML
20 INJECTION INTRAMUSCULAR; INTRAVENOUS ONCE
Status: COMPLETED | OUTPATIENT
Start: 2018-01-01 | End: 2018-01-01

## 2018-01-01 RX ORDER — METOLAZONE 5 MG/1
5 TABLET ORAL ONCE
Status: COMPLETED | OUTPATIENT
Start: 2018-01-01 | End: 2018-01-01

## 2018-01-01 RX ORDER — POTASSIUM CHLORIDE 750 MG/1
20 TABLET, FILM COATED, EXTENDED RELEASE ORAL DAILY
Qty: 5 TABLET | Refills: 0 | Status: ON HOLD | OUTPATIENT
Start: 2018-01-01 | End: 2018-01-01

## 2018-01-01 RX ORDER — PREDNISONE 2.5 MG
2.5 TABLET ORAL EVERY OTHER DAY
COMMUNITY
End: 2018-01-01 | Stop reason: HOSPADM

## 2018-01-01 RX ORDER — HYDRALAZINE HYDROCHLORIDE 50 MG/1
50 TABLET, FILM COATED ORAL EVERY 8 HOURS SCHEDULED
Status: DISCONTINUED | OUTPATIENT
Start: 2018-01-01 | End: 2018-01-01

## 2018-01-01 RX ORDER — HEPARIN SODIUM 1000 [USP'U]/ML
1800 INJECTION, SOLUTION INTRAVENOUS; SUBCUTANEOUS AS NEEDED
Status: DISCONTINUED | OUTPATIENT
Start: 2018-01-01 | End: 2018-01-01 | Stop reason: HOSPADM

## 2018-01-01 RX ORDER — POTASSIUM CHLORIDE 7.45 MG/ML
10 INJECTION INTRAVENOUS
Status: DISCONTINUED | OUTPATIENT
Start: 2018-01-01 | End: 2018-01-01 | Stop reason: HOSPADM

## 2018-01-01 RX ORDER — FAMOTIDINE 10 MG/ML
20 INJECTION, SOLUTION INTRAVENOUS
Status: DISCONTINUED | OUTPATIENT
Start: 2018-01-01 | End: 2018-01-01 | Stop reason: HOSPADM

## 2018-01-01 RX ORDER — POTASSIUM CHLORIDE 750 MG/1
20 CAPSULE, EXTENDED RELEASE ORAL 2 TIMES DAILY
Qty: 120 CAPSULE | Refills: 0 | Status: SHIPPED | OUTPATIENT
Start: 2018-01-01 | End: 2018-01-01

## 2018-01-01 RX ORDER — HYDROCODONE BITARTRATE AND ACETAMINOPHEN 7.5; 325 MG/1; MG/1
1 TABLET ORAL EVERY 4 HOURS PRN
Status: DISCONTINUED | OUTPATIENT
Start: 2018-01-01 | End: 2018-01-01 | Stop reason: HOSPADM

## 2018-01-01 RX ORDER — ALLOPURINOL 100 MG/1
100 TABLET ORAL DAILY
Status: DISCONTINUED | OUTPATIENT
Start: 2018-01-01 | End: 2018-01-01 | Stop reason: HOSPADM

## 2018-01-01 RX ORDER — LACTULOSE 20 G/30ML
20 SOLUTION ORAL DAILY
Status: DISCONTINUED | OUTPATIENT
Start: 2018-01-01 | End: 2018-01-01 | Stop reason: HOSPADM

## 2018-01-01 RX ORDER — COLCHICINE 0.6 MG/1
0.6 TABLET ORAL DAILY
Status: COMPLETED | OUTPATIENT
Start: 2018-01-01 | End: 2018-01-01

## 2018-01-01 RX ORDER — POTASSIUM CHLORIDE 750 MG/1
40 CAPSULE, EXTENDED RELEASE ORAL DAILY
Status: DISCONTINUED | OUTPATIENT
Start: 2018-01-01 | End: 2018-01-01

## 2018-01-01 RX ORDER — SODIUM CHLORIDE, SODIUM LACTATE, POTASSIUM CHLORIDE, CALCIUM CHLORIDE 600; 310; 30; 20 MG/100ML; MG/100ML; MG/100ML; MG/100ML
100 INJECTION, SOLUTION INTRAVENOUS CONTINUOUS
Status: DISCONTINUED | OUTPATIENT
Start: 2018-01-01 | End: 2018-01-01

## 2018-01-01 RX ORDER — ALBUTEROL SULFATE 90 UG/1
2 AEROSOL, METERED RESPIRATORY (INHALATION) EVERY 4 HOURS PRN
Status: DISCONTINUED | OUTPATIENT
Start: 2018-01-01 | End: 2018-01-01 | Stop reason: CLARIF

## 2018-01-01 RX ORDER — IPRATROPIUM BROMIDE AND ALBUTEROL SULFATE 2.5; .5 MG/3ML; MG/3ML
3 SOLUTION RESPIRATORY (INHALATION) ONCE AS NEEDED
Status: CANCELLED | OUTPATIENT
Start: 2018-01-01

## 2018-01-01 RX ORDER — FUROSEMIDE 10 MG/ML
40 INJECTION INTRAMUSCULAR; INTRAVENOUS ONCE
Status: COMPLETED | OUTPATIENT
Start: 2018-01-01 | End: 2018-01-01

## 2018-01-01 RX ORDER — CLOPIDOGREL BISULFATE 75 MG/1
75 TABLET ORAL DAILY
COMMUNITY
End: 2018-01-01

## 2018-01-01 RX ORDER — ALBUMIN (HUMAN) 12.5 G/50ML
12.5 SOLUTION INTRAVENOUS AS NEEDED
Status: CANCELLED | OUTPATIENT
Start: 2018-01-01 | End: 2018-01-01

## 2018-01-01 RX ORDER — FUROSEMIDE 20 MG/1
20 TABLET ORAL DAILY
COMMUNITY
End: 2018-01-01 | Stop reason: SDUPTHER

## 2018-01-01 RX ORDER — DEXTROSE, SODIUM CHLORIDE, AND POTASSIUM CHLORIDE 5; .45; .15 G/100ML; G/100ML; G/100ML
75 INJECTION INTRAVENOUS CONTINUOUS
Status: DISCONTINUED | OUTPATIENT
Start: 2018-01-01 | End: 2018-01-01

## 2018-01-01 RX ORDER — GLYCOPYRROLATE 0.2 MG/ML
INJECTION INTRAMUSCULAR; INTRAVENOUS AS NEEDED
Status: DISCONTINUED | OUTPATIENT
Start: 2018-01-01 | End: 2018-01-01 | Stop reason: SURG

## 2018-01-01 RX ORDER — FENTANYL CITRATE 50 UG/ML
INJECTION, SOLUTION INTRAMUSCULAR; INTRAVENOUS AS NEEDED
Status: DISCONTINUED | OUTPATIENT
Start: 2018-01-01 | End: 2018-01-01 | Stop reason: HOSPADM

## 2018-01-01 RX ORDER — NALOXONE HCL 0.4 MG/ML
0.4 VIAL (ML) INJECTION
Status: DISCONTINUED | OUTPATIENT
Start: 2018-01-01 | End: 2018-01-01 | Stop reason: HOSPADM

## 2018-01-01 RX ORDER — PROPOFOL 10 MG/ML
VIAL (ML) INTRAVENOUS AS NEEDED
Status: DISCONTINUED | OUTPATIENT
Start: 2018-01-01 | End: 2018-01-01 | Stop reason: SURG

## 2018-01-01 RX ORDER — POTASSIUM CHLORIDE 7.45 MG/ML
10 INJECTION INTRAVENOUS ONCE
Status: COMPLETED | OUTPATIENT
Start: 2018-01-01 | End: 2018-01-01

## 2018-01-01 RX ORDER — FUROSEMIDE 20 MG/1
10 TABLET ORAL TAKE AS DIRECTED
COMMUNITY
End: 2018-01-01 | Stop reason: HOSPADM

## 2018-01-01 RX ORDER — MIDAZOLAM HYDROCHLORIDE 1 MG/ML
2 INJECTION INTRAMUSCULAR; INTRAVENOUS
Status: DISCONTINUED | OUTPATIENT
Start: 2018-01-01 | End: 2018-01-01 | Stop reason: HOSPADM

## 2018-01-01 RX ORDER — ONDANSETRON 2 MG/ML
4 INJECTION INTRAMUSCULAR; INTRAVENOUS EVERY 6 HOURS PRN
Status: DISCONTINUED | OUTPATIENT
Start: 2018-01-01 | End: 2018-01-01

## 2018-01-01 RX ORDER — NALOXONE HCL 0.4 MG/ML
0.4 VIAL (ML) INJECTION AS NEEDED
Status: CANCELLED | OUTPATIENT
Start: 2018-01-01

## 2018-01-01 RX ORDER — ALBUTEROL SULFATE 2.5 MG/3ML
2.5 SOLUTION RESPIRATORY (INHALATION) EVERY 6 HOURS PRN
Status: DISCONTINUED | OUTPATIENT
Start: 2018-01-01 | End: 2018-01-01 | Stop reason: HOSPADM

## 2018-01-01 RX ORDER — HYDROCODONE BITARTRATE AND ACETAMINOPHEN 7.5; 325 MG/1; MG/1
1 TABLET ORAL EVERY 4 HOURS PRN
Status: DISCONTINUED | OUTPATIENT
Start: 2018-01-01 | End: 2018-01-01 | Stop reason: SDUPTHER

## 2018-01-01 RX ORDER — FAMOTIDINE 10 MG/ML
20 INJECTION, SOLUTION INTRAVENOUS 2 TIMES DAILY
Status: DISCONTINUED | OUTPATIENT
Start: 2018-01-01 | End: 2018-01-01 | Stop reason: DRUGHIGH

## 2018-01-01 RX ORDER — MILRINONE LACTATE 0.2 MG/ML
0.38 INJECTION, SOLUTION INTRAVENOUS
Status: DISCONTINUED | OUTPATIENT
Start: 2018-01-01 | End: 2018-01-01

## 2018-01-01 RX ORDER — NALOXONE HYDROCHLORIDE 0.4 MG/ML
INJECTION, SOLUTION INTRAMUSCULAR; INTRAVENOUS; SUBCUTANEOUS AS NEEDED
Status: DISCONTINUED | OUTPATIENT
Start: 2018-01-01 | End: 2018-01-01 | Stop reason: SURG

## 2018-01-01 RX ORDER — FUROSEMIDE 10 MG/ML
80 INJECTION INTRAMUSCULAR; INTRAVENOUS EVERY 12 HOURS SCHEDULED
Status: DISCONTINUED | OUTPATIENT
Start: 2018-01-01 | End: 2018-01-01

## 2018-01-01 RX ORDER — FAMOTIDINE 20 MG/1
20 TABLET, FILM COATED ORAL DAILY
Status: DISCONTINUED | OUTPATIENT
Start: 2018-01-01 | End: 2018-01-01 | Stop reason: HOSPADM

## 2018-01-01 RX ORDER — ALBUTEROL SULFATE 2.5 MG/3ML
2.5 SOLUTION RESPIRATORY (INHALATION) EVERY 4 HOURS PRN
COMMUNITY
End: 2018-01-01

## 2018-01-01 RX ORDER — ISOSORBIDE DINITRATE 10 MG/1
10 TABLET ORAL
Status: DISCONTINUED | OUTPATIENT
Start: 2018-01-01 | End: 2018-01-01

## 2018-01-01 RX ORDER — ALBUTEROL SULFATE 2.5 MG/3ML
2.5 SOLUTION RESPIRATORY (INHALATION) EVERY 4 HOURS PRN
Status: DISCONTINUED | OUTPATIENT
Start: 2018-01-01 | End: 2018-01-01 | Stop reason: HOSPADM

## 2018-01-01 RX ORDER — ONDANSETRON 2 MG/ML
4 INJECTION INTRAMUSCULAR; INTRAVENOUS ONCE AS NEEDED
Status: CANCELLED | OUTPATIENT
Start: 2018-01-01

## 2018-01-01 RX ORDER — ISOSORBIDE DINITRATE 40 MG/1
40 TABLET ORAL 3 TIMES DAILY
Qty: 90 TABLET | Refills: 11 | Status: SHIPPED | OUTPATIENT
Start: 2018-01-01 | End: 2018-01-01

## 2018-01-01 RX ORDER — NITROFURANTOIN 25; 75 MG/1; MG/1
100 CAPSULE ORAL 2 TIMES DAILY
Qty: 14 CAPSULE | Refills: 0 | Status: ON HOLD | OUTPATIENT
Start: 2018-01-01 | End: 2018-01-01

## 2018-01-01 RX ORDER — FUROSEMIDE 10 MG/ML
20 INJECTION INTRAMUSCULAR; INTRAVENOUS EVERY 12 HOURS
Status: DISCONTINUED | OUTPATIENT
Start: 2018-01-01 | End: 2018-01-01

## 2018-01-01 RX ORDER — FENTANYL CITRATE 50 UG/ML
25 INJECTION, SOLUTION INTRAMUSCULAR; INTRAVENOUS AS NEEDED
Status: DISCONTINUED | OUTPATIENT
Start: 2018-01-01 | End: 2018-01-01

## 2018-01-01 RX ORDER — AMIODARONE HYDROCHLORIDE 200 MG/1
200 TABLET ORAL
Qty: 30 TABLET | Refills: 11 | Status: ON HOLD | OUTPATIENT
Start: 2018-01-01 | End: 2018-01-01 | Stop reason: SDUPTHER

## 2018-01-01 RX ORDER — FAMOTIDINE 10 MG/ML
20 INJECTION, SOLUTION INTRAVENOUS 2 TIMES DAILY
Status: DISCONTINUED | OUTPATIENT
Start: 2018-01-01 | End: 2018-01-01 | Stop reason: CLARIF

## 2018-01-01 RX ORDER — SODIUM CHLORIDE 0.9 % (FLUSH) 0.9 %
3 SYRINGE (ML) INJECTION EVERY 12 HOURS SCHEDULED
Status: DISCONTINUED | OUTPATIENT
Start: 2018-01-01 | End: 2018-01-01 | Stop reason: HOSPADM

## 2018-01-01 RX ORDER — POTASSIUM CHLORIDE 1.5 G/1.77G
40 POWDER, FOR SOLUTION ORAL AS NEEDED
Status: DISCONTINUED | OUTPATIENT
Start: 2018-01-01 | End: 2018-01-01 | Stop reason: HOSPADM

## 2018-01-01 RX ORDER — POTASSIUM CHLORIDE 20 MEQ/1
40 TABLET, EXTENDED RELEASE ORAL ONCE
Status: DISCONTINUED | OUTPATIENT
Start: 2018-01-01 | End: 2018-01-01 | Stop reason: CLARIF

## 2018-01-01 RX ORDER — NITROFURANTOIN 25; 75 MG/1; MG/1
100 CAPSULE ORAL 2 TIMES DAILY
COMMUNITY
End: 2018-01-01 | Stop reason: HOSPADM

## 2018-01-01 RX ORDER — MAGNESIUM SULFATE HEPTAHYDRATE 40 MG/ML
4 INJECTION, SOLUTION INTRAVENOUS AS NEEDED
Status: DISCONTINUED | OUTPATIENT
Start: 2018-01-01 | End: 2018-01-01 | Stop reason: HOSPADM

## 2018-01-01 RX ORDER — FENTANYL CITRATE 50 UG/ML
INJECTION, SOLUTION INTRAMUSCULAR; INTRAVENOUS AS NEEDED
Status: DISCONTINUED | OUTPATIENT
Start: 2018-01-01 | End: 2018-01-01 | Stop reason: SURG

## 2018-01-01 RX ORDER — FAMOTIDINE 20 MG/1
20 TABLET, FILM COATED ORAL NIGHTLY
Status: DISCONTINUED | OUTPATIENT
Start: 2018-01-01 | End: 2018-01-01 | Stop reason: HOSPADM

## 2018-01-01 RX ORDER — POTASSIUM CHLORIDE 750 MG/1
40 CAPSULE, EXTENDED RELEASE ORAL AS NEEDED
Status: DISCONTINUED | OUTPATIENT
Start: 2018-01-01 | End: 2018-01-01

## 2018-01-01 RX ORDER — OXYCODONE AND ACETAMINOPHEN 10; 325 MG/1; MG/1
1 TABLET ORAL ONCE AS NEEDED
Status: COMPLETED | OUTPATIENT
Start: 2018-01-01 | End: 2018-01-01

## 2018-01-01 RX ORDER — MEPERIDINE HYDROCHLORIDE 50 MG/ML
12.5 INJECTION INTRAMUSCULAR; INTRAVENOUS; SUBCUTANEOUS
Status: DISCONTINUED | OUTPATIENT
Start: 2018-01-01 | End: 2018-01-01 | Stop reason: HOSPADM

## 2018-01-01 RX ORDER — PRAVASTATIN SODIUM 20 MG
20 TABLET ORAL NIGHTLY
COMMUNITY
End: 2018-01-01 | Stop reason: HOSPADM

## 2018-01-01 RX ORDER — CALCITRIOL 0.25 UG/1
0.25 CAPSULE, LIQUID FILLED ORAL EVERY OTHER DAY
Status: DISCONTINUED | OUTPATIENT
Start: 2018-01-01 | End: 2018-01-01

## 2018-01-01 RX ORDER — DEXAMETHASONE SODIUM PHOSPHATE 4 MG/ML
INJECTION, SOLUTION INTRA-ARTICULAR; INTRALESIONAL; INTRAMUSCULAR; INTRAVENOUS; SOFT TISSUE AS NEEDED
Status: DISCONTINUED | OUTPATIENT
Start: 2018-01-01 | End: 2018-01-01 | Stop reason: SURG

## 2018-01-01 RX ORDER — LABETALOL HYDROCHLORIDE 5 MG/ML
5 INJECTION, SOLUTION INTRAVENOUS
Status: DISCONTINUED | OUTPATIENT
Start: 2018-01-01 | End: 2018-01-01 | Stop reason: HOSPADM

## 2018-01-01 RX ORDER — MAGNESIUM HYDROXIDE 1200 MG/15ML
LIQUID ORAL AS NEEDED
Status: DISCONTINUED | OUTPATIENT
Start: 2018-01-01 | End: 2018-01-01 | Stop reason: HOSPADM

## 2018-01-01 RX ORDER — METOPROLOL SUCCINATE 25 MG/1
25 TABLET, EXTENDED RELEASE ORAL DAILY
Qty: 30 TABLET | Refills: 11 | Status: SHIPPED | OUTPATIENT
Start: 2018-01-01 | End: 2018-01-01 | Stop reason: HOSPADM

## 2018-01-01 RX ORDER — PROMETHAZINE HYDROCHLORIDE 25 MG/ML
6.25 INJECTION, SOLUTION INTRAMUSCULAR; INTRAVENOUS EVERY 4 HOURS PRN
Status: DISCONTINUED | OUTPATIENT
Start: 2018-01-01 | End: 2018-01-01

## 2018-01-01 RX ORDER — OXYCODONE HYDROCHLORIDE 5 MG/1
5 TABLET ORAL EVERY 6 HOURS PRN
Status: DISCONTINUED | OUTPATIENT
Start: 2018-01-01 | End: 2018-01-01 | Stop reason: HOSPADM

## 2018-01-01 RX ORDER — HYDRALAZINE HYDROCHLORIDE 25 MG/1
75 TABLET, FILM COATED ORAL EVERY 8 HOURS SCHEDULED
Qty: 90 TABLET | Refills: 11 | Status: SHIPPED | OUTPATIENT
Start: 2018-01-01 | End: 2018-01-01

## 2018-01-01 RX ORDER — ATORVASTATIN CALCIUM 10 MG/1
10 TABLET, FILM COATED ORAL NIGHTLY
Status: DISCONTINUED | OUTPATIENT
Start: 2018-01-01 | End: 2018-01-01 | Stop reason: HOSPADM

## 2018-01-01 RX ORDER — LIDOCAINE HYDROCHLORIDE 10 MG/ML
INJECTION, SOLUTION EPIDURAL; INFILTRATION; INTRACAUDAL; PERINEURAL AS NEEDED
Status: DISCONTINUED | OUTPATIENT
Start: 2018-01-01 | End: 2018-01-01 | Stop reason: HOSPADM

## 2018-01-01 RX ORDER — ONDANSETRON 4 MG/1
4 TABLET, ORALLY DISINTEGRATING ORAL EVERY 6 HOURS PRN
Status: DISCONTINUED | OUTPATIENT
Start: 2018-01-01 | End: 2018-01-01

## 2018-01-01 RX ORDER — POTASSIUM CHLORIDE 750 MG/1
40 CAPSULE, EXTENDED RELEASE ORAL EVERY 4 HOURS
Status: COMPLETED | OUTPATIENT
Start: 2018-01-01 | End: 2018-01-01

## 2018-01-01 RX ORDER — HYDRALAZINE HYDROCHLORIDE 20 MG/ML
5 INJECTION INTRAMUSCULAR; INTRAVENOUS
Status: DISCONTINUED | OUTPATIENT
Start: 2018-01-01 | End: 2018-01-01 | Stop reason: HOSPADM

## 2018-01-01 RX ORDER — HYDRALAZINE HYDROCHLORIDE 25 MG/1
75 TABLET, FILM COATED ORAL EVERY 8 HOURS SCHEDULED
Qty: 90 TABLET | Refills: 11 | Status: SHIPPED | OUTPATIENT
Start: 2018-01-01 | End: 2018-01-01 | Stop reason: HOSPADM

## 2018-01-01 RX ORDER — SODIUM CHLORIDE 9 MG/ML
125 INJECTION, SOLUTION INTRAVENOUS CONTINUOUS
Status: DISCONTINUED | OUTPATIENT
Start: 2018-01-01 | End: 2018-01-01 | Stop reason: HOSPADM

## 2018-01-01 RX ORDER — IPRATROPIUM BROMIDE AND ALBUTEROL SULFATE 2.5; .5 MG/3ML; MG/3ML
3 SOLUTION RESPIRATORY (INHALATION) ONCE AS NEEDED
Status: DISCONTINUED | OUTPATIENT
Start: 2018-01-01 | End: 2018-01-01 | Stop reason: HOSPADM

## 2018-01-01 RX ORDER — OXYCODONE AND ACETAMINOPHEN 10; 325 MG/1; MG/1
1 TABLET ORAL ONCE AS NEEDED
Status: CANCELLED | OUTPATIENT
Start: 2018-01-01

## 2018-01-01 RX ORDER — BUPIVACAINE HYDROCHLORIDE AND EPINEPHRINE 5; 5 MG/ML; UG/ML
INJECTION, SOLUTION PERINEURAL AS NEEDED
Status: DISCONTINUED | OUTPATIENT
Start: 2018-01-01 | End: 2018-01-01 | Stop reason: HOSPADM

## 2018-01-01 RX ORDER — FUROSEMIDE 20 MG/1
20 TABLET ORAL 2 TIMES DAILY
COMMUNITY
End: 2018-01-01 | Stop reason: HOSPADM

## 2018-01-01 RX ORDER — AMIODARONE HYDROCHLORIDE 200 MG/1
200 TABLET ORAL EVERY 12 HOURS
COMMUNITY
End: 2018-01-01 | Stop reason: HOSPADM

## 2018-01-01 RX ORDER — METOLAZONE 2.5 MG/1
2.5 TABLET ORAL DAILY
Status: DISCONTINUED | OUTPATIENT
Start: 2018-01-01 | End: 2018-01-01

## 2018-01-01 RX ORDER — POTASSIUM CHLORIDE 750 MG/1
20 CAPSULE, EXTENDED RELEASE ORAL ONCE
Status: COMPLETED | OUTPATIENT
Start: 2018-01-01 | End: 2018-01-01

## 2018-01-01 RX ORDER — MIDAZOLAM HYDROCHLORIDE 1 MG/ML
1 INJECTION INTRAMUSCULAR; INTRAVENOUS
Status: DISCONTINUED | OUTPATIENT
Start: 2018-01-01 | End: 2018-01-01 | Stop reason: HOSPADM

## 2018-01-01 RX ORDER — METOCLOPRAMIDE HYDROCHLORIDE 5 MG/ML
5 INJECTION INTRAMUSCULAR; INTRAVENOUS
Status: CANCELLED | OUTPATIENT
Start: 2018-01-01

## 2018-01-01 RX ORDER — METOPROLOL TARTRATE 5 MG/5ML
INJECTION INTRAVENOUS AS NEEDED
Status: DISCONTINUED | OUTPATIENT
Start: 2018-01-01 | End: 2018-01-01 | Stop reason: SURG

## 2018-01-01 RX ORDER — ATORVASTATIN CALCIUM 10 MG/1
10 TABLET, FILM COATED ORAL NIGHTLY
Status: DISCONTINUED | OUTPATIENT
Start: 2018-01-01 | End: 2018-01-01

## 2018-01-01 RX ORDER — BUMETANIDE 1 MG/1
1 TABLET ORAL DAILY
Status: DISCONTINUED | OUTPATIENT
Start: 2018-01-01 | End: 2018-01-01

## 2018-01-01 RX ORDER — ALLOPURINOL 100 MG/1
100 TABLET ORAL DAILY
COMMUNITY
End: 2018-01-01

## 2018-01-01 RX ORDER — FAMOTIDINE 20 MG/1
20 TABLET, FILM COATED ORAL NIGHTLY
Qty: 30 TABLET | Refills: 2 | Status: SHIPPED | OUTPATIENT
Start: 2018-01-01 | End: 2019-01-01

## 2018-01-01 RX ORDER — SOTALOL HYDROCHLORIDE 80 MG/1
40 TABLET ORAL EVERY 12 HOURS SCHEDULED
Status: DISCONTINUED | OUTPATIENT
Start: 2018-01-01 | End: 2018-01-01

## 2018-01-01 RX ORDER — LABETALOL HYDROCHLORIDE 5 MG/ML
5 INJECTION, SOLUTION INTRAVENOUS
Status: CANCELLED | OUTPATIENT
Start: 2018-01-01

## 2018-01-01 RX ORDER — ISOSORBIDE DINITRATE 20 MG/1
40 TABLET ORAL 3 TIMES DAILY
Status: DISCONTINUED | OUTPATIENT
Start: 2018-01-01 | End: 2018-01-01 | Stop reason: HOSPADM

## 2018-01-01 RX ORDER — HYDROCODONE BITARTRATE AND ACETAMINOPHEN 7.5; 325 MG/1; MG/1
1 TABLET ORAL EVERY 4 HOURS PRN
Qty: 15 TABLET | Refills: 0 | Status: SHIPPED | OUTPATIENT
Start: 2018-01-01 | End: 2018-01-01

## 2018-01-01 RX ORDER — SODIUM CHLORIDE, SODIUM LACTATE, POTASSIUM CHLORIDE, CALCIUM CHLORIDE 600; 310; 30; 20 MG/100ML; MG/100ML; MG/100ML; MG/100ML
1000 INJECTION, SOLUTION INTRAVENOUS CONTINUOUS
Status: DISCONTINUED | OUTPATIENT
Start: 2018-01-01 | End: 2018-01-01

## 2018-01-01 RX ORDER — HYDRALAZINE HYDROCHLORIDE 10 MG/1
10 TABLET, FILM COATED ORAL EVERY 8 HOURS SCHEDULED
Qty: 90 TABLET | Refills: 1 | Status: SHIPPED | OUTPATIENT
Start: 2018-01-01 | End: 2018-01-01

## 2018-01-01 RX ORDER — HYDROCODONE BITARTRATE AND ACETAMINOPHEN 5; 325 MG/1; MG/1
1 TABLET ORAL EVERY 4 HOURS PRN
Status: DISCONTINUED | OUTPATIENT
Start: 2018-01-01 | End: 2018-01-01 | Stop reason: HOSPADM

## 2018-01-01 RX ORDER — HEPARIN SODIUM 1000 [USP'U]/ML
3600 INJECTION, SOLUTION INTRAVENOUS; SUBCUTANEOUS AS NEEDED
Status: DISCONTINUED | OUTPATIENT
Start: 2018-01-01 | End: 2018-01-01 | Stop reason: HOSPADM

## 2018-01-01 RX ORDER — FUROSEMIDE 10 MG/ML
80 INJECTION INTRAMUSCULAR; INTRAVENOUS EVERY 12 HOURS
Status: DISCONTINUED | OUTPATIENT
Start: 2018-01-01 | End: 2018-01-01

## 2018-01-01 RX ORDER — ALBUTEROL SULFATE 90 UG/1
2 AEROSOL, METERED RESPIRATORY (INHALATION) EVERY 4 HOURS PRN
COMMUNITY
End: 2019-01-01 | Stop reason: HOSPADM

## 2018-01-01 RX ORDER — FUROSEMIDE 10 MG/ML
40 INJECTION INTRAMUSCULAR; INTRAVENOUS ONCE
Status: DISCONTINUED | OUTPATIENT
Start: 2018-01-01 | End: 2018-01-01

## 2018-01-01 RX ORDER — LACTULOSE 20 G/30ML
20 SOLUTION ORAL DAILY
Qty: 900 ML | Refills: 0 | Status: SHIPPED | OUTPATIENT
Start: 2018-01-01 | End: 2018-01-01

## 2018-01-01 RX ORDER — IPRATROPIUM BROMIDE AND ALBUTEROL SULFATE 2.5; .5 MG/3ML; MG/3ML
3 SOLUTION RESPIRATORY (INHALATION) ONCE AS NEEDED
Status: DISCONTINUED | OUTPATIENT
Start: 2018-01-01 | End: 2018-01-01

## 2018-01-01 RX ORDER — SODIUM CHLORIDE 0.9 % (FLUSH) 0.9 %
3-10 SYRINGE (ML) INJECTION AS NEEDED
Status: DISCONTINUED | OUTPATIENT
Start: 2018-01-01 | End: 2018-01-01 | Stop reason: HOSPADM

## 2018-01-01 RX ORDER — CEFDINIR 300 MG/1
300 CAPSULE ORAL
Qty: 10 CAPSULE | Refills: 0 | Status: SHIPPED | OUTPATIENT
Start: 2018-01-01 | End: 2018-01-01

## 2018-01-01 RX ORDER — POTASSIUM CHLORIDE 750 MG/1
20 TABLET, FILM COATED, EXTENDED RELEASE ORAL DAILY
COMMUNITY
End: 2018-01-01 | Stop reason: HOSPADM

## 2018-01-01 RX ORDER — PROMETHAZINE HYDROCHLORIDE 25 MG/1
12.5 TABLET ORAL EVERY 4 HOURS PRN
Status: DISCONTINUED | OUTPATIENT
Start: 2018-01-01 | End: 2018-01-01 | Stop reason: HOSPADM

## 2018-01-01 RX ORDER — ISOSORBIDE DINITRATE 10 MG/1
10 TABLET ORAL EVERY 8 HOURS SCHEDULED
Status: DISCONTINUED | OUTPATIENT
Start: 2018-01-01 | End: 2018-01-01

## 2018-01-01 RX ORDER — AMIODARONE HYDROCHLORIDE 200 MG/1
200 TABLET ORAL EVERY 12 HOURS SCHEDULED
Qty: 60 TABLET | Refills: 11 | Status: SHIPPED | OUTPATIENT
Start: 2018-01-01 | End: 2018-01-01

## 2018-01-01 RX ORDER — METOCLOPRAMIDE HYDROCHLORIDE 5 MG/ML
5 INJECTION INTRAMUSCULAR; INTRAVENOUS
Status: DISCONTINUED | OUTPATIENT
Start: 2018-01-01 | End: 2018-01-01

## 2018-01-01 RX ORDER — NALOXONE HCL 0.4 MG/ML
0.4 VIAL (ML) INJECTION AS NEEDED
Status: DISCONTINUED | OUTPATIENT
Start: 2018-01-01 | End: 2018-01-01 | Stop reason: HOSPADM

## 2018-01-01 RX ORDER — PROMETHAZINE HYDROCHLORIDE 25 MG/1
12.5 TABLET ORAL EVERY 6 HOURS PRN
Status: DISCONTINUED | OUTPATIENT
Start: 2018-01-01 | End: 2018-01-01

## 2018-01-01 RX ORDER — AMIODARONE HYDROCHLORIDE 200 MG/1
200 TABLET ORAL
Qty: 30 TABLET | Refills: 2 | Status: SHIPPED | OUTPATIENT
Start: 2018-01-01 | End: 2018-01-01 | Stop reason: SDUPTHER

## 2018-01-01 RX ORDER — MIDAZOLAM HYDROCHLORIDE 1 MG/ML
INJECTION INTRAMUSCULAR; INTRAVENOUS AS NEEDED
Status: DISCONTINUED | OUTPATIENT
Start: 2018-01-01 | End: 2018-01-01 | Stop reason: SURG

## 2018-01-01 RX ORDER — ROCURONIUM BROMIDE 10 MG/ML
INJECTION, SOLUTION INTRAVENOUS AS NEEDED
Status: DISCONTINUED | OUTPATIENT
Start: 2018-01-01 | End: 2018-01-01 | Stop reason: SURG

## 2018-01-01 RX ORDER — OXYCODONE AND ACETAMINOPHEN 10; 325 MG/1; MG/1
1 TABLET ORAL ONCE AS NEEDED
Status: DISCONTINUED | OUTPATIENT
Start: 2018-01-01 | End: 2018-01-01

## 2018-01-01 RX ORDER — AMIODARONE HYDROCHLORIDE 200 MG/1
200 TABLET ORAL 2 TIMES DAILY
Status: DISCONTINUED | OUTPATIENT
Start: 2018-01-01 | End: 2018-01-01

## 2018-01-01 RX ORDER — ONDANSETRON 4 MG/1
4 TABLET, ORALLY DISINTEGRATING ORAL EVERY 8 HOURS PRN
Qty: 12 TABLET | Refills: 0 | Status: SHIPPED | OUTPATIENT
Start: 2018-01-01 | End: 2019-01-01

## 2018-01-01 RX ORDER — CHLORAL HYDRATE 500 MG
1000 CAPSULE ORAL
COMMUNITY
End: 2018-01-01

## 2018-01-01 RX ORDER — HYDRALAZINE HYDROCHLORIDE 10 MG/1
10 TABLET, FILM COATED ORAL EVERY 8 HOURS SCHEDULED
Status: DISCONTINUED | OUTPATIENT
Start: 2018-01-01 | End: 2018-01-01 | Stop reason: HOSPADM

## 2018-01-01 RX ORDER — MIDODRINE HYDROCHLORIDE 2.5 MG/1
5 TABLET ORAL
Status: DISCONTINUED | OUTPATIENT
Start: 2018-01-01 | End: 2018-01-01

## 2018-01-01 RX ORDER — SODIUM CHLORIDE, SODIUM LACTATE, POTASSIUM CHLORIDE, CALCIUM CHLORIDE 600; 310; 30; 20 MG/100ML; MG/100ML; MG/100ML; MG/100ML
9 INJECTION, SOLUTION INTRAVENOUS CONTINUOUS
Status: DISCONTINUED | OUTPATIENT
Start: 2018-01-01 | End: 2018-01-01

## 2018-01-01 RX ORDER — OXYCODONE AND ACETAMINOPHEN 7.5; 325 MG/1; MG/1
2 TABLET ORAL ONCE AS NEEDED
Status: DISCONTINUED | OUTPATIENT
Start: 2018-01-01 | End: 2018-01-01

## 2018-01-01 RX ORDER — FUROSEMIDE 40 MG/1
40 TABLET ORAL DAILY
Status: DISCONTINUED | OUTPATIENT
Start: 2018-01-01 | End: 2018-01-01 | Stop reason: HOSPADM

## 2018-01-01 RX ADMIN — TAZOBACTAM SODIUM AND PIPERACILLIN SODIUM 3.38 G: 375; 3 INJECTION, SOLUTION INTRAVENOUS at 23:11

## 2018-01-01 RX ADMIN — FAMOTIDINE 20 MG: 20 TABLET, FILM COATED ORAL at 13:50

## 2018-01-01 RX ADMIN — POTASSIUM CHLORIDE 20 MEQ: 750 CAPSULE, EXTENDED RELEASE ORAL at 20:41

## 2018-01-01 RX ADMIN — ENOXAPARIN SODIUM 40 MG: 40 INJECTION SUBCUTANEOUS at 18:05

## 2018-01-01 RX ADMIN — FAMOTIDINE 20 MG: 10 INJECTION INTRAVENOUS at 08:28

## 2018-01-01 RX ADMIN — ONDANSETRON 4 MG: 2 INJECTION INTRAMUSCULAR; INTRAVENOUS at 15:57

## 2018-01-01 RX ADMIN — ALLOPURINOL 100 MG: 100 TABLET ORAL at 08:24

## 2018-01-01 RX ADMIN — BUMETANIDE 1 MG: 1 TABLET ORAL at 20:51

## 2018-01-01 RX ADMIN — POTASSIUM CHLORIDE 40 MEQ: 750 CAPSULE, EXTENDED RELEASE ORAL at 08:45

## 2018-01-01 RX ADMIN — FAMOTIDINE 20 MG: 20 TABLET, FILM COATED ORAL at 20:08

## 2018-01-01 RX ADMIN — HEPARIN SODIUM 1800 UNITS: 1000 INJECTION, SOLUTION INTRAVENOUS; SUBCUTANEOUS at 16:40

## 2018-01-01 RX ADMIN — POTASSIUM CHLORIDE 10 MEQ: 7.46 INJECTION, SOLUTION INTRAVENOUS at 12:45

## 2018-01-01 RX ADMIN — AMIODARONE HYDROCHLORIDE 200 MG: 200 TABLET ORAL at 09:03

## 2018-01-01 RX ADMIN — AMIODARONE HYDROCHLORIDE 200 MG: 200 TABLET ORAL at 15:45

## 2018-01-01 RX ADMIN — ISOSORBIDE DINITRATE 10 MG: 10 TABLET ORAL at 12:39

## 2018-01-01 RX ADMIN — ONDANSETRON HYDROCHLORIDE 4 MG: 2 SOLUTION INTRAMUSCULAR; INTRAVENOUS at 05:56

## 2018-01-01 RX ADMIN — SOTALOL HYDROCHLORIDE 40 MG: 80 TABLET ORAL at 20:23

## 2018-01-01 RX ADMIN — DOCUSATE SODIUM 100 MG: 100 CAPSULE ORAL at 08:43

## 2018-01-01 RX ADMIN — DOCUSATE SODIUM 100 MG: 100 CAPSULE, LIQUID FILLED ORAL at 21:12

## 2018-01-01 RX ADMIN — ATORVASTATIN CALCIUM 10 MG: 10 TABLET, FILM COATED ORAL at 20:30

## 2018-01-01 RX ADMIN — TAZOBACTAM SODIUM AND PIPERACILLIN SODIUM 3.38 G: 375; 3 INJECTION, SOLUTION INTRAVENOUS at 16:17

## 2018-01-01 RX ADMIN — DEXAMETHASONE SODIUM PHOSPHATE 4 MG: 4 INJECTION, SOLUTION INTRAMUSCULAR; INTRAVENOUS at 15:57

## 2018-01-01 RX ADMIN — FUROSEMIDE 80 MG: 10 INJECTION, SOLUTION INTRAMUSCULAR; INTRAVENOUS at 12:37

## 2018-01-01 RX ADMIN — TAZOBACTAM SODIUM AND PIPERACILLIN SODIUM 3.38 G: 375; 3 INJECTION, SOLUTION INTRAVENOUS at 00:21

## 2018-01-01 RX ADMIN — AMIODARONE HYDROCHLORIDE 200 MG: 200 TABLET ORAL at 09:24

## 2018-01-01 RX ADMIN — AMIODARONE HYDROCHLORIDE 200 MG: 200 TABLET ORAL at 20:08

## 2018-01-01 RX ADMIN — HYDRALAZINE HYDROCHLORIDE 75 MG: 50 TABLET ORAL at 06:17

## 2018-01-01 RX ADMIN — MORPHINE SULFATE 2 MG: 2 INJECTION, SOLUTION INTRAMUSCULAR; INTRAVENOUS at 00:03

## 2018-01-01 RX ADMIN — FAMOTIDINE 20 MG: 10 INJECTION INTRAVENOUS at 08:49

## 2018-01-01 RX ADMIN — CEFTRIAXONE SODIUM 1 G: 1 INJECTION, POWDER, FOR SOLUTION INTRAMUSCULAR; INTRAVENOUS at 15:30

## 2018-01-01 RX ADMIN — ISOSORBIDE MONONITRATE 120 MG: 120 TABLET, EXTENDED RELEASE ORAL at 08:47

## 2018-01-01 RX ADMIN — DOCUSATE SODIUM 100 MG: 100 CAPSULE ORAL at 20:42

## 2018-01-01 RX ADMIN — HYDROCODONE BITARTRATE AND ACETAMINOPHEN 1 TABLET: 7.5; 325 TABLET ORAL at 21:14

## 2018-01-01 RX ADMIN — POTASSIUM CHLORIDE, DEXTROSE MONOHYDRATE AND SODIUM CHLORIDE 75 ML/HR: 150; 5; 450 INJECTION, SOLUTION INTRAVENOUS at 18:06

## 2018-01-01 RX ADMIN — PROMETHAZINE HYDROCHLORIDE 12.5 MG: 25 TABLET ORAL at 21:01

## 2018-01-01 RX ADMIN — ISOSORBIDE DINITRATE 10 MG: 10 TABLET ORAL at 17:20

## 2018-01-01 RX ADMIN — POTASSIUM CHLORIDE 20 MEQ: 750 CAPSULE, EXTENDED RELEASE ORAL at 08:43

## 2018-01-01 RX ADMIN — FAMOTIDINE 20 MG: 10 INJECTION INTRAVENOUS at 20:22

## 2018-01-01 RX ADMIN — AMIODARONE HYDROCHLORIDE 200 MG: 200 TABLET ORAL at 08:58

## 2018-01-01 RX ADMIN — POTASSIUM CHLORIDE 40 MEQ: 750 CAPSULE, EXTENDED RELEASE ORAL at 08:49

## 2018-01-01 RX ADMIN — ISOSORBIDE DINITRATE 20 MG: 20 TABLET ORAL at 23:11

## 2018-01-01 RX ADMIN — TAZOBACTAM SODIUM AND PIPERACILLIN SODIUM 3.38 G: 375; 3 INJECTION, SOLUTION INTRAVENOUS at 09:16

## 2018-01-01 RX ADMIN — POTASSIUM CHLORIDE 10 MEQ: 10 INJECTION, SOLUTION INTRAVENOUS at 05:27

## 2018-01-01 RX ADMIN — NALOXONE HYDROCHLORIDE 0.2 MG: 0.4 INJECTION, SOLUTION INTRAMUSCULAR; INTRAVENOUS; SUBCUTANEOUS at 14:43

## 2018-01-01 RX ADMIN — ENOXAPARIN SODIUM 30 MG: 30 INJECTION SUBCUTANEOUS at 08:16

## 2018-01-01 RX ADMIN — POTASSIUM CHLORIDE, DEXTROSE MONOHYDRATE AND SODIUM CHLORIDE 75 ML/HR: 150; 5; 450 INJECTION, SOLUTION INTRAVENOUS at 21:51

## 2018-01-01 RX ADMIN — HYDRALAZINE HYDROCHLORIDE 50 MG: 50 TABLET ORAL at 00:21

## 2018-01-01 RX ADMIN — FENTANYL CITRATE 50 MCG: 50 INJECTION, SOLUTION INTRAMUSCULAR; INTRAVENOUS at 13:55

## 2018-01-01 RX ADMIN — TAZOBACTAM SODIUM AND PIPERACILLIN SODIUM 3.38 G: 375; 3 INJECTION, SOLUTION INTRAVENOUS at 00:01

## 2018-01-01 RX ADMIN — TAZOBACTAM SODIUM AND PIPERACILLIN SODIUM 3.38 G: 375; 3 INJECTION, SOLUTION INTRAVENOUS at 09:03

## 2018-01-01 RX ADMIN — MORPHINE SULFATE 2 MG: 2 INJECTION, SOLUTION INTRAMUSCULAR; INTRAVENOUS at 18:05

## 2018-01-01 RX ADMIN — VASOPRESSIN 1 UNITS: 20 INJECTION INTRAVENOUS at 13:30

## 2018-01-01 RX ADMIN — DOCUSATE SODIUM 100 MG: 100 CAPSULE, LIQUID FILLED ORAL at 08:55

## 2018-01-01 RX ADMIN — HYDRALAZINE HYDROCHLORIDE 75 MG: 50 TABLET ORAL at 20:41

## 2018-01-01 RX ADMIN — ISOSORBIDE DINITRATE 10 MG: 10 TABLET ORAL at 08:24

## 2018-01-01 RX ADMIN — LIDOCAINE HYDROCHLORIDE 50 MG: 20 INJECTION, SOLUTION INFILTRATION; PERINEURAL at 13:23

## 2018-01-01 RX ADMIN — LACTULOSE 20 G: 20 SOLUTION ORAL at 08:53

## 2018-01-01 RX ADMIN — ONDANSETRON HYDROCHLORIDE 4 MG: 2 INJECTION, SOLUTION INTRAMUSCULAR; INTRAVENOUS at 10:47

## 2018-01-01 RX ADMIN — FENTANYL CITRATE 50 MCG: 50 INJECTION, SOLUTION INTRAMUSCULAR; INTRAVENOUS at 14:04

## 2018-01-01 RX ADMIN — BUMETANIDE 1 MG: 1 TABLET ORAL at 17:08

## 2018-01-01 RX ADMIN — VASOPRESSIN 1 UNITS: 20 INJECTION INTRAVENOUS at 13:40

## 2018-01-01 RX ADMIN — BUMETANIDE 1 MG: 0.25 INJECTION INTRAMUSCULAR; INTRAVENOUS at 08:14

## 2018-01-01 RX ADMIN — CEFTRIAXONE SODIUM 1 G: 1 INJECTION, POWDER, FOR SOLUTION INTRAMUSCULAR; INTRAVENOUS at 13:57

## 2018-01-01 RX ADMIN — FAMOTIDINE 20 MG: 20 TABLET, FILM COATED ORAL at 20:54

## 2018-01-01 RX ADMIN — ALLOPURINOL 100 MG: 100 TABLET ORAL at 08:55

## 2018-01-01 RX ADMIN — ISOSORBIDE DINITRATE 20 MG: 20 TABLET ORAL at 09:03

## 2018-01-01 RX ADMIN — AMIODARONE HYDROCHLORIDE 200 MG: 200 TABLET ORAL at 08:25

## 2018-01-01 RX ADMIN — FAMOTIDINE 20 MG: 20 TABLET, FILM COATED ORAL at 09:34

## 2018-01-01 RX ADMIN — PROMETHAZINE HYDROCHLORIDE 6.25 MG: 25 INJECTION INTRAMUSCULAR; INTRAVENOUS at 14:40

## 2018-01-01 RX ADMIN — AMIODARONE HYDROCHLORIDE 0.5 MG/MIN: 1.8 INJECTION, SOLUTION INTRAVENOUS at 16:40

## 2018-01-01 RX ADMIN — HEPARIN SODIUM 5000 UNITS: 5000 INJECTION INTRAVENOUS; SUBCUTANEOUS at 05:23

## 2018-01-01 RX ADMIN — MIDAZOLAM 2 MG: 1 INJECTION INTRAMUSCULAR; INTRAVENOUS at 13:12

## 2018-01-01 RX ADMIN — ONDANSETRON HYDROCHLORIDE 4 MG: 2 INJECTION, SOLUTION INTRAMUSCULAR; INTRAVENOUS at 19:33

## 2018-01-01 RX ADMIN — TAZOBACTAM SODIUM AND PIPERACILLIN SODIUM 3.38 G: 375; 3 INJECTION, SOLUTION INTRAVENOUS at 15:55

## 2018-01-01 RX ADMIN — DOCUSATE SODIUM 100 MG: 100 CAPSULE, LIQUID FILLED ORAL at 08:47

## 2018-01-01 RX ADMIN — ATORVASTATIN CALCIUM 10 MG: 10 TABLET, FILM COATED ORAL at 20:35

## 2018-01-01 RX ADMIN — ATORVASTATIN CALCIUM 10 MG: 10 TABLET, FILM COATED ORAL at 20:42

## 2018-01-01 RX ADMIN — FAMOTIDINE 20 MG: 20 TABLET, FILM COATED ORAL at 21:01

## 2018-01-01 RX ADMIN — Medication 10 ML: at 23:47

## 2018-01-01 RX ADMIN — ENOXAPARIN SODIUM 30 MG: 30 INJECTION SUBCUTANEOUS at 17:30

## 2018-01-01 RX ADMIN — AMIODARONE HYDROCHLORIDE 200 MG: 200 TABLET ORAL at 08:47

## 2018-01-01 RX ADMIN — MILRINONE LACTATE 0.25 MCG/KG/MIN: 200 INJECTION, SOLUTION INTRAVENOUS at 15:32

## 2018-01-01 RX ADMIN — AMIODARONE HYDROCHLORIDE 0.5 MG/MIN: 1.8 INJECTION, SOLUTION INTRAVENOUS at 05:36

## 2018-01-01 RX ADMIN — ONDANSETRON HYDROCHLORIDE 4 MG: 2 INJECTION, SOLUTION INTRAMUSCULAR; INTRAVENOUS at 07:52

## 2018-01-01 RX ADMIN — FAMOTIDINE 20 MG: 20 TABLET, FILM COATED ORAL at 09:25

## 2018-01-01 RX ADMIN — MILRINONE LACTATE 0.38 MCG/KG/MIN: 200 INJECTION, SOLUTION INTRAVENOUS at 19:29

## 2018-01-01 RX ADMIN — HYDROCODONE BITARTRATE AND ACETAMINOPHEN 1 TABLET: 7.5; 325 TABLET ORAL at 10:48

## 2018-01-01 RX ADMIN — ENOXAPARIN SODIUM 30 MG: 30 INJECTION SUBCUTANEOUS at 08:55

## 2018-01-01 RX ADMIN — SODIUM CHLORIDE, POTASSIUM CHLORIDE, SODIUM LACTATE AND CALCIUM CHLORIDE 9 ML/HR: 600; 310; 30; 20 INJECTION, SOLUTION INTRAVENOUS at 15:50

## 2018-01-01 RX ADMIN — POTASSIUM CHLORIDE 40 MEQ: 750 CAPSULE, EXTENDED RELEASE ORAL at 20:30

## 2018-01-01 RX ADMIN — OXYCODONE HYDROCHLORIDE 5 MG: 5 TABLET ORAL at 20:21

## 2018-01-01 RX ADMIN — FENTANYL CITRATE 100 MCG: 50 INJECTION, SOLUTION INTRAMUSCULAR; INTRAVENOUS at 13:23

## 2018-01-01 RX ADMIN — PIPERACILLIN AND TAZOBACTAM 3.38 G: 3; .375 INJECTION, POWDER, LYOPHILIZED, FOR SOLUTION INTRAVENOUS; PARENTERAL at 12:17

## 2018-01-01 RX ADMIN — HYDRALAZINE HYDROCHLORIDE 25 MG: 25 TABLET, FILM COATED ORAL at 11:30

## 2018-01-01 RX ADMIN — FAMOTIDINE 20 MG: 20 TABLET, FILM COATED ORAL at 20:51

## 2018-01-01 RX ADMIN — Medication 3 MG: at 14:22

## 2018-01-01 RX ADMIN — POTASSIUM CHLORIDE 40 MEQ: 750 CAPSULE, EXTENDED RELEASE ORAL at 03:58

## 2018-01-01 RX ADMIN — FAMOTIDINE 20 MG: 20 TABLET, FILM COATED ORAL at 21:12

## 2018-01-01 RX ADMIN — ENOXAPARIN SODIUM 40 MG: 40 INJECTION SUBCUTANEOUS at 18:50

## 2018-01-01 RX ADMIN — ENOXAPARIN SODIUM 40 MG: 40 INJECTION SUBCUTANEOUS at 18:11

## 2018-01-01 RX ADMIN — ISOSORBIDE DINITRATE 30 MG: 20 TABLET ORAL at 15:57

## 2018-01-01 RX ADMIN — FAMOTIDINE 20 MG: 10 INJECTION, SOLUTION INTRAVENOUS at 13:04

## 2018-01-01 RX ADMIN — TAZOBACTAM SODIUM AND PIPERACILLIN SODIUM 3.38 G: 375; 3 INJECTION, SOLUTION INTRAVENOUS at 22:34

## 2018-01-01 RX ADMIN — HYDRALAZINE HYDROCHLORIDE 10 MG: 10 TABLET ORAL at 06:51

## 2018-01-01 RX ADMIN — TAZOBACTAM SODIUM AND PIPERACILLIN SODIUM 3.38 G: 375; 3 INJECTION, SOLUTION INTRAVENOUS at 00:32

## 2018-01-01 RX ADMIN — SODIUM CHLORIDE: 450 INJECTION, SOLUTION INTRAVENOUS at 11:06

## 2018-01-01 RX ADMIN — ISOSORBIDE MONONITRATE 120 MG: 120 TABLET, EXTENDED RELEASE ORAL at 08:43

## 2018-01-01 RX ADMIN — ISOSORBIDE DINITRATE 10 MG: 10 TABLET ORAL at 20:18

## 2018-01-01 RX ADMIN — AMIODARONE HYDROCHLORIDE 200 MG: 200 TABLET ORAL at 08:02

## 2018-01-01 RX ADMIN — SODIUM CHLORIDE 1000 ML: 9 INJECTION, SOLUTION INTRAVENOUS at 12:17

## 2018-01-01 RX ADMIN — POTASSIUM CHLORIDE 40 MEQ: 750 CAPSULE, EXTENDED RELEASE ORAL at 09:33

## 2018-01-01 RX ADMIN — DOCUSATE SODIUM 100 MG: 100 CAPSULE, LIQUID FILLED ORAL at 10:57

## 2018-01-01 RX ADMIN — Medication 10 ML: at 05:51

## 2018-01-01 RX ADMIN — LIDOCAINE HYDROCHLORIDE 2 MG/MIN: 4 INJECTION, SOLUTION INTRAVENOUS at 13:00

## 2018-01-01 RX ADMIN — ACETAMINOPHEN 650 MG: 325 TABLET ORAL at 00:38

## 2018-01-01 RX ADMIN — ALLOPURINOL 100 MG: 100 TABLET ORAL at 08:45

## 2018-01-01 RX ADMIN — AMIODARONE HYDROCHLORIDE 150 MG: 1.5 INJECTION, SOLUTION INTRAVENOUS at 14:08

## 2018-01-01 RX ADMIN — FAMOTIDINE 20 MG: 20 TABLET, FILM COATED ORAL at 20:17

## 2018-01-01 RX ADMIN — ATORVASTATIN CALCIUM 10 MG: 10 TABLET, FILM COATED ORAL at 21:02

## 2018-01-01 RX ADMIN — FENTANYL CITRATE 25 MCG: 50 INJECTION, SOLUTION INTRAMUSCULAR; INTRAVENOUS at 09:00

## 2018-01-01 RX ADMIN — ISOSORBIDE DINITRATE 10 MG: 10 TABLET ORAL at 23:21

## 2018-01-01 RX ADMIN — FAMOTIDINE 20 MG: 20 TABLET, FILM COATED ORAL at 20:20

## 2018-01-01 RX ADMIN — FAMOTIDINE 20 MG: 20 TABLET, FILM COATED ORAL at 21:02

## 2018-01-01 RX ADMIN — TAZOBACTAM SODIUM AND PIPERACILLIN SODIUM 3.38 G: 375; 3 INJECTION, SOLUTION INTRAVENOUS at 15:36

## 2018-01-01 RX ADMIN — ONDANSETRON HYDROCHLORIDE 4 MG: 2 INJECTION, SOLUTION INTRAMUSCULAR; INTRAVENOUS at 20:15

## 2018-01-01 RX ADMIN — ATORVASTATIN CALCIUM 10 MG: 10 TABLET, FILM COATED ORAL at 20:45

## 2018-01-01 RX ADMIN — AMIODARONE HYDROCHLORIDE 200 MG: 200 TABLET ORAL at 13:21

## 2018-01-01 RX ADMIN — DOCUSATE SODIUM 100 MG: 100 CAPSULE ORAL at 15:30

## 2018-01-01 RX ADMIN — FAMOTIDINE 20 MG: 10 INJECTION INTRAVENOUS at 09:03

## 2018-01-01 RX ADMIN — FUROSEMIDE 40 MG: 10 INJECTION, SOLUTION INTRAMUSCULAR; INTRAVENOUS at 17:22

## 2018-01-01 RX ADMIN — FAMOTIDINE 20 MG: 20 TABLET, FILM COATED ORAL at 08:43

## 2018-01-01 RX ADMIN — TAZOBACTAM SODIUM AND PIPERACILLIN SODIUM 3.38 G: 375; 3 INJECTION, SOLUTION INTRAVENOUS at 17:49

## 2018-01-01 RX ADMIN — TAZOBACTAM SODIUM AND PIPERACILLIN SODIUM 3.38 G: 375; 3 INJECTION, SOLUTION INTRAVENOUS at 08:23

## 2018-01-01 RX ADMIN — TAZOBACTAM SODIUM AND PIPERACILLIN SODIUM 3.38 G: 375; 3 INJECTION, SOLUTION INTRAVENOUS at 06:17

## 2018-01-01 RX ADMIN — TAZOBACTAM SODIUM AND PIPERACILLIN SODIUM 3.38 G: 375; 3 INJECTION, SOLUTION INTRAVENOUS at 08:45

## 2018-01-01 RX ADMIN — ATORVASTATIN CALCIUM 10 MG: 10 TABLET, FILM COATED ORAL at 20:20

## 2018-01-01 RX ADMIN — SODIUM CHLORIDE, POTASSIUM CHLORIDE, SODIUM LACTATE AND CALCIUM CHLORIDE 1000 ML: 600; 310; 30; 20 INJECTION, SOLUTION INTRAVENOUS at 09:43

## 2018-01-01 RX ADMIN — ALLOPURINOL 100 MG: 100 TABLET ORAL at 08:02

## 2018-01-01 RX ADMIN — AMIODARONE HYDROCHLORIDE 200 MG: 200 TABLET ORAL at 08:03

## 2018-01-01 RX ADMIN — ATORVASTATIN CALCIUM 10 MG: 10 TABLET, FILM COATED ORAL at 20:56

## 2018-01-01 RX ADMIN — TAZOBACTAM SODIUM AND PIPERACILLIN SODIUM 3.38 G: 375; 3 INJECTION, SOLUTION INTRAVENOUS at 15:37

## 2018-01-01 RX ADMIN — ATORVASTATIN CALCIUM 10 MG: 10 TABLET, FILM COATED ORAL at 20:08

## 2018-01-01 RX ADMIN — AMIODARONE HYDROCHLORIDE 200 MG: 200 TABLET ORAL at 08:45

## 2018-01-01 RX ADMIN — DOCUSATE SODIUM 100 MG: 100 CAPSULE ORAL at 20:45

## 2018-01-01 RX ADMIN — TAZOBACTAM SODIUM AND PIPERACILLIN SODIUM 3.38 G: 375; 3 INJECTION, SOLUTION INTRAVENOUS at 09:01

## 2018-01-01 RX ADMIN — POTASSIUM CHLORIDE 40 MEQ: 750 CAPSULE, EXTENDED RELEASE ORAL at 08:24

## 2018-01-01 RX ADMIN — ONDANSETRON HYDROCHLORIDE 4 MG: 2 INJECTION, SOLUTION INTRAMUSCULAR; INTRAVENOUS at 07:28

## 2018-01-01 RX ADMIN — HYDRALAZINE HYDROCHLORIDE 75 MG: 50 TABLET ORAL at 05:23

## 2018-01-01 RX ADMIN — ONDANSETRON 4 MG: 2 INJECTION INTRAMUSCULAR; INTRAVENOUS at 11:59

## 2018-01-01 RX ADMIN — POTASSIUM CHLORIDE 10 MEQ: 7.46 INJECTION, SOLUTION INTRAVENOUS at 16:53

## 2018-01-01 RX ADMIN — SODIUM CHLORIDE 75 ML/HR: 9 INJECTION, SOLUTION INTRAVENOUS at 05:10

## 2018-01-01 RX ADMIN — AMIODARONE HYDROCHLORIDE 200 MG: 200 TABLET ORAL at 11:50

## 2018-01-01 RX ADMIN — FUROSEMIDE 40 MG: 10 INJECTION, SOLUTION INTRAMUSCULAR; INTRAVENOUS at 13:50

## 2018-01-01 RX ADMIN — ALLOPURINOL 100 MG: 100 TABLET ORAL at 09:33

## 2018-01-01 RX ADMIN — AMIODARONE HYDROCHLORIDE 0.5 MG/MIN: 1.8 INJECTION, SOLUTION INTRAVENOUS at 07:44

## 2018-01-01 RX ADMIN — FAMOTIDINE 20 MG: 20 TABLET, FILM COATED ORAL at 21:58

## 2018-01-01 RX ADMIN — FAMOTIDINE 20 MG: 10 INJECTION INTRAVENOUS at 20:36

## 2018-01-01 RX ADMIN — TAZOBACTAM SODIUM AND PIPERACILLIN SODIUM 3.38 G: 375; 3 INJECTION, SOLUTION INTRAVENOUS at 08:27

## 2018-01-01 RX ADMIN — ACETAMINOPHEN 650 MG: 325 TABLET ORAL at 19:44

## 2018-01-01 RX ADMIN — FAMOTIDINE 20 MG: 20 TABLET, FILM COATED ORAL at 21:24

## 2018-01-01 RX ADMIN — FUROSEMIDE 80 MG: 10 INJECTION, SOLUTION INTRAMUSCULAR; INTRAVENOUS at 20:27

## 2018-01-01 RX ADMIN — HYDRALAZINE HYDROCHLORIDE 75 MG: 50 TABLET ORAL at 05:41

## 2018-01-01 RX ADMIN — HEPARIN SODIUM 1800 UNITS: 1000 INJECTION, SOLUTION INTRAVENOUS; SUBCUTANEOUS at 16:35

## 2018-01-01 RX ADMIN — HEPARIN SODIUM 5000 UNITS: 5000 INJECTION INTRAVENOUS; SUBCUTANEOUS at 20:45

## 2018-01-01 RX ADMIN — FUROSEMIDE 20 MG: 10 INJECTION, SOLUTION INTRAMUSCULAR; INTRAVENOUS at 05:25

## 2018-01-01 RX ADMIN — FAMOTIDINE 20 MG: 20 TABLET, FILM COATED ORAL at 20:35

## 2018-01-01 RX ADMIN — POTASSIUM CHLORIDE 40 MEQ: 750 CAPSULE, EXTENDED RELEASE ORAL at 17:08

## 2018-01-01 RX ADMIN — FUROSEMIDE 20 MG: 10 INJECTION, SOLUTION INTRAVENOUS at 03:02

## 2018-01-01 RX ADMIN — ISOSORBIDE MONONITRATE 120 MG: 120 TABLET, EXTENDED RELEASE ORAL at 09:24

## 2018-01-01 RX ADMIN — POTASSIUM CHLORIDE 10 MEQ: 7.46 INJECTION, SOLUTION INTRAVENOUS at 14:08

## 2018-01-01 RX ADMIN — AMIODARONE HYDROCHLORIDE 200 MG: 200 TABLET ORAL at 20:38

## 2018-01-01 RX ADMIN — TAZOBACTAM SODIUM AND PIPERACILLIN SODIUM 3.38 G: 375; 3 INJECTION, SOLUTION INTRAVENOUS at 18:52

## 2018-01-01 RX ADMIN — HYDRALAZINE HYDROCHLORIDE 75 MG: 50 TABLET ORAL at 21:02

## 2018-01-01 RX ADMIN — HYDRALAZINE HYDROCHLORIDE 10 MG: 10 TABLET ORAL at 21:35

## 2018-01-01 RX ADMIN — TAZOBACTAM SODIUM AND PIPERACILLIN SODIUM 3.38 G: 375; 3 INJECTION, SOLUTION INTRAVENOUS at 18:59

## 2018-01-01 RX ADMIN — ACETAMINOPHEN 650 MG: 325 TABLET, FILM COATED ORAL at 20:19

## 2018-01-01 RX ADMIN — CEFTRIAXONE SODIUM 1 G: 1 INJECTION, POWDER, FOR SOLUTION INTRAMUSCULAR; INTRAVENOUS at 16:05

## 2018-01-01 RX ADMIN — SODIUM CHLORIDE: 450 INJECTION, SOLUTION INTRAVENOUS at 01:35

## 2018-01-01 RX ADMIN — HYDRALAZINE HYDROCHLORIDE 50 MG: 50 TABLET ORAL at 04:40

## 2018-01-01 RX ADMIN — ENOXAPARIN SODIUM 30 MG: 30 INJECTION SUBCUTANEOUS at 17:44

## 2018-01-01 RX ADMIN — POTASSIUM CHLORIDE 20 MEQ: 750 CAPSULE, EXTENDED RELEASE ORAL at 19:45

## 2018-01-01 RX ADMIN — ATORVASTATIN CALCIUM 10 MG: 10 TABLET, FILM COATED ORAL at 21:12

## 2018-01-01 RX ADMIN — HYDRALAZINE HYDROCHLORIDE 10 MG: 10 TABLET ORAL at 21:12

## 2018-01-01 RX ADMIN — AMIODARONE HYDROCHLORIDE 200 MG: 200 TABLET ORAL at 20:42

## 2018-01-01 RX ADMIN — ATORVASTATIN CALCIUM 10 MG: 10 TABLET, FILM COATED ORAL at 20:22

## 2018-01-01 RX ADMIN — ENOXAPARIN SODIUM 30 MG: 30 INJECTION SUBCUTANEOUS at 19:28

## 2018-01-01 RX ADMIN — HYDROCODONE BITARTRATE AND ACETAMINOPHEN 1 TABLET: 5; 325 TABLET ORAL at 16:47

## 2018-01-01 RX ADMIN — ISOSORBIDE MONONITRATE 120 MG: 120 TABLET, EXTENDED RELEASE ORAL at 08:24

## 2018-01-01 RX ADMIN — AMIODARONE HYDROCHLORIDE 200 MG: 200 TABLET ORAL at 09:36

## 2018-01-01 RX ADMIN — ISOSORBIDE MONONITRATE 120 MG: 120 TABLET, EXTENDED RELEASE ORAL at 08:02

## 2018-01-01 RX ADMIN — FAMOTIDINE 20 MG: 10 INJECTION INTRAVENOUS at 08:46

## 2018-01-01 RX ADMIN — AMIODARONE HYDROCHLORIDE 200 MG: 200 TABLET ORAL at 09:33

## 2018-01-01 RX ADMIN — ISOSORBIDE DINITRATE 10 MG: 10 TABLET ORAL at 17:07

## 2018-01-01 RX ADMIN — ONDANSETRON HYDROCHLORIDE 4 MG: 2 INJECTION INTRAMUSCULAR; INTRAVENOUS at 22:42

## 2018-01-01 RX ADMIN — BUMETANIDE 1 MG: 0.25 INJECTION INTRAMUSCULAR; INTRAVENOUS at 14:57

## 2018-01-01 RX ADMIN — ISOSORBIDE DINITRATE 20 MG: 20 TABLET ORAL at 15:25

## 2018-01-01 RX ADMIN — AMIODARONE HYDROCHLORIDE 0.5 MG/MIN: 1.8 INJECTION, SOLUTION INTRAVENOUS at 03:58

## 2018-01-01 RX ADMIN — GLYCOPYRROLATE 0.4 MG: 0.2 INJECTION, SOLUTION INTRAMUSCULAR; INTRAVENOUS at 14:22

## 2018-01-01 RX ADMIN — TAZOBACTAM SODIUM AND PIPERACILLIN SODIUM 3.38 G: 375; 3 INJECTION, SOLUTION INTRAVENOUS at 00:25

## 2018-01-01 RX ADMIN — AMIODARONE HYDROCHLORIDE 200 MG: 200 TABLET ORAL at 08:16

## 2018-01-01 RX ADMIN — AMIODARONE HYDROCHLORIDE 0.5 MG/MIN: 1.8 INJECTION, SOLUTION INTRAVENOUS at 14:23

## 2018-01-01 RX ADMIN — OXYCODONE HYDROCHLORIDE 5 MG: 5 TABLET ORAL at 20:29

## 2018-01-01 RX ADMIN — AMIODARONE HYDROCHLORIDE 150 MG: 1.5 INJECTION, SOLUTION INTRAVENOUS at 23:29

## 2018-01-01 RX ADMIN — ALLOPURINOL 100 MG: 100 TABLET ORAL at 08:03

## 2018-01-01 RX ADMIN — MORPHINE SULFATE 2 MG: 2 INJECTION, SOLUTION INTRAMUSCULAR; INTRAVENOUS at 18:11

## 2018-01-01 RX ADMIN — DOCUSATE SODIUM 100 MG: 100 CAPSULE, LIQUID FILLED ORAL at 21:58

## 2018-01-01 RX ADMIN — ENOXAPARIN SODIUM 30 MG: 30 INJECTION SUBCUTANEOUS at 18:37

## 2018-01-01 RX ADMIN — AMIODARONE HYDROCHLORIDE 200 MG: 200 TABLET ORAL at 08:55

## 2018-01-01 RX ADMIN — ACETAMINOPHEN 650 MG: 325 TABLET, FILM COATED ORAL at 00:15

## 2018-01-01 RX ADMIN — SOTALOL HYDROCHLORIDE 40 MG: 80 TABLET ORAL at 08:46

## 2018-01-01 RX ADMIN — IOPAMIDOL 150 ML: 755 INJECTION, SOLUTION INTRAVENOUS at 13:28

## 2018-01-01 RX ADMIN — ENOXAPARIN SODIUM 30 MG: 30 INJECTION SUBCUTANEOUS at 17:47

## 2018-01-01 RX ADMIN — AMIODARONE HYDROCHLORIDE 200 MG: 200 TABLET ORAL at 13:50

## 2018-01-01 RX ADMIN — HEPARIN SODIUM 3600 UNITS: 1000 INJECTION INTRAVENOUS; SUBCUTANEOUS at 17:38

## 2018-01-01 RX ADMIN — HYDRALAZINE HYDROCHLORIDE 25 MG: 25 TABLET, FILM COATED ORAL at 20:57

## 2018-01-01 RX ADMIN — POTASSIUM CHLORIDE 10 MEQ: 7.46 INJECTION, SOLUTION INTRAVENOUS at 10:30

## 2018-01-01 RX ADMIN — ENOXAPARIN SODIUM 30 MG: 30 INJECTION SUBCUTANEOUS at 18:54

## 2018-01-01 RX ADMIN — HYDRALAZINE HYDROCHLORIDE 75 MG: 50 TABLET ORAL at 21:01

## 2018-01-01 RX ADMIN — SODIUM CHLORIDE, POTASSIUM CHLORIDE, SODIUM LACTATE AND CALCIUM CHLORIDE: 600; 310; 30; 20 INJECTION, SOLUTION INTRAVENOUS at 13:00

## 2018-01-01 RX ADMIN — PROMETHAZINE HYDROCHLORIDE 6.25 MG: 25 INJECTION INTRAMUSCULAR; INTRAVENOUS at 17:49

## 2018-01-01 RX ADMIN — POTASSIUM CHLORIDE 40 MEQ: 750 CAPSULE, EXTENDED RELEASE ORAL at 11:30

## 2018-01-01 RX ADMIN — AMIODARONE HYDROCHLORIDE 200 MG: 200 TABLET ORAL at 21:13

## 2018-01-01 RX ADMIN — EPHEDRINE SULFATE 10 MG: 50 INJECTION INTRAMUSCULAR; INTRAVENOUS; SUBCUTANEOUS at 13:45

## 2018-01-01 RX ADMIN — SODIUM CHLORIDE 500 ML: 9 INJECTION, SOLUTION INTRAVENOUS at 20:32

## 2018-01-01 RX ADMIN — ACETAMINOPHEN 650 MG: 325 TABLET ORAL at 05:18

## 2018-01-01 RX ADMIN — HYDRALAZINE HYDROCHLORIDE 75 MG: 50 TABLET ORAL at 05:58

## 2018-01-01 RX ADMIN — PROPOFOL 80 MG: 10 INJECTION, EMULSION INTRAVENOUS at 13:23

## 2018-01-01 RX ADMIN — FAMOTIDINE 20 MG: 20 TABLET, FILM COATED ORAL at 20:44

## 2018-01-01 RX ADMIN — ACETAMINOPHEN 650 MG: 325 TABLET ORAL at 16:18

## 2018-01-01 RX ADMIN — LORAZEPAM 1 MG: 2 INJECTION INTRAMUSCULAR; INTRAVENOUS at 01:39

## 2018-01-01 RX ADMIN — AMIODARONE HYDROCHLORIDE 0.5 MG/MIN: 1.8 INJECTION, SOLUTION INTRAVENOUS at 21:12

## 2018-01-01 RX ADMIN — HYDRALAZINE HYDROCHLORIDE 75 MG: 50 TABLET ORAL at 14:26

## 2018-01-01 RX ADMIN — POTASSIUM CHLORIDE 40 MEQ: 750 CAPSULE, EXTENDED RELEASE ORAL at 06:26

## 2018-01-01 RX ADMIN — ONDANSETRON HYDROCHLORIDE 4 MG: 2 INJECTION INTRAMUSCULAR; INTRAVENOUS at 05:11

## 2018-01-01 RX ADMIN — ENOXAPARIN SODIUM 30 MG: 30 INJECTION SUBCUTANEOUS at 17:07

## 2018-01-01 RX ADMIN — HYDRALAZINE HYDROCHLORIDE 10 MG: 10 TABLET ORAL at 21:24

## 2018-01-01 RX ADMIN — ENOXAPARIN SODIUM 30 MG: 30 INJECTION SUBCUTANEOUS at 17:36

## 2018-01-01 RX ADMIN — TAZOBACTAM SODIUM AND PIPERACILLIN SODIUM 3.38 G: 375; 3 INJECTION, SOLUTION INTRAVENOUS at 16:46

## 2018-01-01 RX ADMIN — CEFTRIAXONE SODIUM 1 G: 1 INJECTION, POWDER, FOR SOLUTION INTRAMUSCULAR; INTRAVENOUS at 16:20

## 2018-01-01 RX ADMIN — METOLAZONE 5 MG: 5 TABLET ORAL at 17:21

## 2018-01-01 RX ADMIN — AMIODARONE HYDROCHLORIDE 200 MG: 200 TABLET ORAL at 08:53

## 2018-01-01 RX ADMIN — ENOXAPARIN SODIUM 30 MG: 30 INJECTION SUBCUTANEOUS at 17:22

## 2018-01-01 RX ADMIN — TAZOBACTAM SODIUM AND PIPERACILLIN SODIUM 3.38 G: 375; 3 INJECTION, SOLUTION INTRAVENOUS at 08:01

## 2018-01-01 RX ADMIN — POTASSIUM CHLORIDE, DEXTROSE MONOHYDRATE AND SODIUM CHLORIDE 75 ML/HR: 150; 5; 450 INJECTION, SOLUTION INTRAVENOUS at 08:46

## 2018-01-01 RX ADMIN — HYDRALAZINE HYDROCHLORIDE 10 MG: 10 TABLET ORAL at 03:53

## 2018-01-01 RX ADMIN — OXYCODONE HYDROCHLORIDE 5 MG: 5 TABLET ORAL at 14:07

## 2018-01-01 RX ADMIN — HYDRALAZINE HYDROCHLORIDE 75 MG: 50 TABLET ORAL at 06:25

## 2018-01-01 RX ADMIN — LACTULOSE 20 G: 20 SOLUTION ORAL at 08:09

## 2018-01-01 RX ADMIN — BUMETANIDE 1 MG: 0.25 INJECTION INTRAMUSCULAR; INTRAVENOUS at 07:53

## 2018-01-01 RX ADMIN — TAZOBACTAM SODIUM AND PIPERACILLIN SODIUM 3.38 G: 375; 3 INJECTION, SOLUTION INTRAVENOUS at 19:59

## 2018-01-01 RX ADMIN — MIDODRINE HYDROCHLORIDE 5 MG: 2.5 TABLET ORAL at 17:47

## 2018-01-01 RX ADMIN — ATORVASTATIN CALCIUM 10 MG: 10 TABLET, FILM COATED ORAL at 20:38

## 2018-01-01 RX ADMIN — AMIODARONE HYDROCHLORIDE 200 MG: 200 TABLET ORAL at 20:56

## 2018-01-01 RX ADMIN — DEXAMETHASONE SODIUM PHOSPHATE 4 MG: 4 INJECTION, SOLUTION INTRAMUSCULAR; INTRAVENOUS at 17:51

## 2018-01-01 RX ADMIN — TAZOBACTAM SODIUM AND PIPERACILLIN SODIUM 3.38 G: 375; 3 INJECTION, SOLUTION INTRAVENOUS at 23:34

## 2018-01-01 RX ADMIN — POTASSIUM CHLORIDE 40 MEQ: 750 CAPSULE, EXTENDED RELEASE ORAL at 09:34

## 2018-01-01 RX ADMIN — AMIODARONE HYDROCHLORIDE 200 MG: 200 TABLET ORAL at 20:44

## 2018-01-01 RX ADMIN — HEPARIN SODIUM 5000 UNITS: 5000 INJECTION INTRAVENOUS; SUBCUTANEOUS at 20:42

## 2018-01-01 RX ADMIN — METOLAZONE 5 MG: 5 TABLET ORAL at 18:49

## 2018-01-01 RX ADMIN — POTASSIUM CHLORIDE 10 MEQ: 7.46 INJECTION, SOLUTION INTRAVENOUS at 15:42

## 2018-01-01 RX ADMIN — HYDRALAZINE HYDROCHLORIDE 75 MG: 50 TABLET ORAL at 22:26

## 2018-01-01 RX ADMIN — FAMOTIDINE 20 MG: 10 INJECTION INTRAVENOUS at 21:49

## 2018-01-01 RX ADMIN — POTASSIUM CHLORIDE 20 MEQ: 750 CAPSULE, EXTENDED RELEASE ORAL at 20:47

## 2018-01-01 RX ADMIN — AMIODARONE HYDROCHLORIDE 200 MG: 200 TABLET ORAL at 08:24

## 2018-01-01 RX ADMIN — ALLOPURINOL 100 MG: 100 TABLET ORAL at 19:58

## 2018-01-01 RX ADMIN — TAZOBACTAM SODIUM AND PIPERACILLIN SODIUM 3.38 G: 375; 3 INJECTION, SOLUTION INTRAVENOUS at 06:30

## 2018-01-01 RX ADMIN — POTASSIUM CHLORIDE 40 MEQ: 750 CAPSULE, EXTENDED RELEASE ORAL at 05:21

## 2018-01-01 RX ADMIN — LIDOCAINE HYDROCHLORIDE 78 MG: 20 INJECTION, SOLUTION INTRAVENOUS at 13:00

## 2018-01-01 RX ADMIN — ISOSORBIDE DINITRATE 40 MG: 20 TABLET ORAL at 08:45

## 2018-01-01 RX ADMIN — HEPARIN SODIUM 3600 UNITS: 1000 INJECTION INTRAVENOUS; SUBCUTANEOUS at 19:00

## 2018-01-01 RX ADMIN — ONDANSETRON 4 MG: 2 INJECTION, SOLUTION INTRAMUSCULAR; INTRAVENOUS at 20:32

## 2018-01-01 RX ADMIN — OXYCODONE HYDROCHLORIDE AND ACETAMINOPHEN 1 TABLET: 10; 325 TABLET ORAL at 15:27

## 2018-01-01 RX ADMIN — ATORVASTATIN CALCIUM 10 MG: 10 TABLET, FILM COATED ORAL at 21:58

## 2018-01-01 RX ADMIN — AMIODARONE HYDROCHLORIDE 200 MG: 200 TABLET ORAL at 08:09

## 2018-01-01 RX ADMIN — ISOSORBIDE DINITRATE 30 MG: 20 TABLET ORAL at 08:00

## 2018-01-01 RX ADMIN — ISOSORBIDE DINITRATE 10 MG: 10 TABLET ORAL at 12:06

## 2018-01-01 RX ADMIN — MORPHINE SULFATE 2 MG: 2 INJECTION, SOLUTION INTRAMUSCULAR; INTRAVENOUS at 15:27

## 2018-01-01 RX ADMIN — CEFTRIAXONE SODIUM 1 G: 1 INJECTION, POWDER, FOR SOLUTION INTRAMUSCULAR; INTRAVENOUS at 19:44

## 2018-01-01 RX ADMIN — ISOSORBIDE DINITRATE 40 MG: 20 TABLET ORAL at 23:34

## 2018-01-01 RX ADMIN — ATORVASTATIN CALCIUM 10 MG: 10 TABLET, FILM COATED ORAL at 20:54

## 2018-01-01 RX ADMIN — ONDANSETRON 4 MG: 2 INJECTION INTRAMUSCULAR; INTRAVENOUS at 12:19

## 2018-01-01 RX ADMIN — FAMOTIDINE 20 MG: 20 TABLET, FILM COATED ORAL at 08:24

## 2018-01-01 RX ADMIN — HYDRALAZINE HYDROCHLORIDE 75 MG: 50 TABLET ORAL at 21:58

## 2018-01-01 RX ADMIN — METOPROLOL TARTRATE 1 MG: 5 INJECTION INTRAVENOUS at 14:00

## 2018-01-01 RX ADMIN — ENOXAPARIN SODIUM 30 MG: 30 INJECTION SUBCUTANEOUS at 18:19

## 2018-01-01 RX ADMIN — ONDANSETRON 4 MG: 2 INJECTION, SOLUTION INTRAMUSCULAR; INTRAVENOUS at 18:20

## 2018-01-01 RX ADMIN — TAZOBACTAM SODIUM AND PIPERACILLIN SODIUM 3.38 G: 375; 3 INJECTION, SOLUTION INTRAVENOUS at 17:48

## 2018-01-01 RX ADMIN — ATORVASTATIN CALCIUM 10 MG: 10 TABLET, FILM COATED ORAL at 21:01

## 2018-01-01 RX ADMIN — ATORVASTATIN CALCIUM 10 MG: 10 TABLET, FILM COATED ORAL at 20:51

## 2018-01-01 RX ADMIN — CEFAZOLIN 2 G: 330 INJECTION, POWDER, FOR SOLUTION INTRAMUSCULAR; INTRAVENOUS at 09:02

## 2018-01-01 RX ADMIN — ATORVASTATIN CALCIUM 10 MG: 10 TABLET, FILM COATED ORAL at 20:17

## 2018-01-01 RX ADMIN — Medication 1 G: at 13:30

## 2018-01-01 RX ADMIN — HYDRALAZINE HYDROCHLORIDE 75 MG: 50 TABLET ORAL at 12:55

## 2018-01-01 RX ADMIN — ISOSORBIDE DINITRATE 40 MG: 20 TABLET ORAL at 15:57

## 2018-01-01 RX ADMIN — BUMETANIDE 1 MG: 1 TABLET ORAL at 08:16

## 2018-01-01 RX ADMIN — HYDROCODONE BITARTRATE AND ACETAMINOPHEN 1 TABLET: 7.5; 325 TABLET ORAL at 10:25

## 2018-01-01 RX ADMIN — SODIUM CHLORIDE, PRESERVATIVE FREE 3 ML: 5 INJECTION INTRAVENOUS at 21:25

## 2018-01-01 RX ADMIN — ACETAMINOPHEN 650 MG: 325 TABLET, FILM COATED ORAL at 08:43

## 2018-01-01 RX ADMIN — HYDROCODONE BITARTRATE AND ACETAMINOPHEN 1 TABLET: 7.5; 325 TABLET ORAL at 09:13

## 2018-01-01 RX ADMIN — LACTULOSE 20 G: 20 SOLUTION ORAL at 09:33

## 2018-01-01 RX ADMIN — FAMOTIDINE 20 MG: 20 TABLET, FILM COATED ORAL at 21:07

## 2018-01-01 RX ADMIN — MORPHINE SULFATE 2 MG: 2 INJECTION, SOLUTION INTRAMUSCULAR; INTRAVENOUS at 21:49

## 2018-01-01 RX ADMIN — CEFTRIAXONE SODIUM 1 G: 1 INJECTION, POWDER, FOR SOLUTION INTRAMUSCULAR; INTRAVENOUS at 16:03

## 2018-01-01 RX ADMIN — FAMOTIDINE 20 MG: 20 TABLET, FILM COATED ORAL at 21:36

## 2018-01-01 RX ADMIN — ROCURONIUM BROMIDE 25 MG: 10 INJECTION INTRAVENOUS at 13:23

## 2018-01-01 RX ADMIN — FAMOTIDINE 20 MG: 20 TABLET, FILM COATED ORAL at 14:12

## 2018-01-01 RX ADMIN — FAMOTIDINE 20 MG: 20 TABLET, FILM COATED ORAL at 20:00

## 2018-01-01 RX ADMIN — ISOSORBIDE DINITRATE 30 MG: 20 TABLET ORAL at 20:57

## 2018-01-01 RX ADMIN — COLCHICINE 0.6 MG: 0.6 TABLET, FILM COATED ORAL at 10:30

## 2018-01-01 RX ADMIN — HEPARIN SODIUM 3600 UNITS: 1000 INJECTION INTRAVENOUS; SUBCUTANEOUS at 12:04

## 2018-01-01 RX ADMIN — ISOSORBIDE DINITRATE 10 MG: 10 TABLET ORAL at 17:51

## 2018-01-01 RX ADMIN — TAZOBACTAM SODIUM AND PIPERACILLIN SODIUM 3.38 G: 375; 3 INJECTION, SOLUTION INTRAVENOUS at 17:19

## 2018-01-01 RX ADMIN — TAZOBACTAM SODIUM AND PIPERACILLIN SODIUM 3.38 G: 375; 3 INJECTION, SOLUTION INTRAVENOUS at 14:26

## 2018-01-01 RX ADMIN — TAZOBACTAM SODIUM AND PIPERACILLIN SODIUM 3.38 G: 375; 3 INJECTION, SOLUTION INTRAVENOUS at 08:46

## 2018-01-01 RX ADMIN — AMIODARONE HYDROCHLORIDE 200 MG: 200 TABLET ORAL at 08:43

## 2018-01-01 RX ADMIN — HYDRALAZINE HYDROCHLORIDE 75 MG: 50 TABLET ORAL at 13:11

## 2018-01-01 RX ADMIN — HEPARIN SODIUM 3600 UNITS: 1000 INJECTION INTRAVENOUS; SUBCUTANEOUS at 13:23

## 2018-01-01 RX ADMIN — POTASSIUM CHLORIDE 40 MEQ: 750 CAPSULE, EXTENDED RELEASE ORAL at 13:51

## 2018-01-01 RX ADMIN — HYDRALAZINE HYDROCHLORIDE 10 MG: 10 TABLET ORAL at 00:46

## 2018-01-01 RX ADMIN — POTASSIUM CHLORIDE 40 MEQ: 750 CAPSULE, EXTENDED RELEASE ORAL at 06:12

## 2018-01-01 RX ADMIN — MIDODRINE HYDROCHLORIDE 5 MG: 2.5 TABLET ORAL at 09:32

## 2018-01-01 RX ADMIN — AMIODARONE HYDROCHLORIDE 200 MG: 200 TABLET ORAL at 12:39

## 2018-01-01 RX ADMIN — ONDANSETRON 4 MG: 2 INJECTION, SOLUTION INTRAMUSCULAR; INTRAVENOUS at 01:40

## 2018-01-01 RX ADMIN — ENOXAPARIN SODIUM 30 MG: 30 INJECTION SUBCUTANEOUS at 08:50

## 2018-01-01 RX ADMIN — BUMETANIDE 1 MG: 1 TABLET ORAL at 08:24

## 2018-01-01 RX ADMIN — HEPARIN SODIUM 5000 UNITS: 5000 INJECTION INTRAVENOUS; SUBCUTANEOUS at 13:11

## 2018-01-01 RX ADMIN — POTASSIUM CHLORIDE 20 MEQ: 750 CAPSULE, EXTENDED RELEASE ORAL at 09:24

## 2018-01-01 RX ADMIN — ALLOPURINOL 100 MG: 100 TABLET ORAL at 08:16

## 2018-01-01 RX ADMIN — FAMOTIDINE 20 MG: 10 INJECTION INTRAVENOUS at 21:13

## 2018-01-01 RX ADMIN — POLYETHYLENE GLYCOL (3350) 17 G: 17 POWDER, FOR SOLUTION ORAL at 09:24

## 2018-01-01 RX ADMIN — TAZOBACTAM SODIUM AND PIPERACILLIN SODIUM 3.38 G: 375; 3 INJECTION, SOLUTION INTRAVENOUS at 17:51

## 2018-01-01 RX ADMIN — AMIODARONE HYDROCHLORIDE 200 MG: 200 TABLET ORAL at 17:44

## 2018-01-01 RX ADMIN — TAZOBACTAM SODIUM AND PIPERACILLIN SODIUM 3.38 G: 375; 3 INJECTION, SOLUTION INTRAVENOUS at 08:49

## 2018-01-01 RX ADMIN — HYDRALAZINE HYDROCHLORIDE 10 MG: 10 TABLET ORAL at 12:19

## 2018-01-01 RX ADMIN — SODIUM CHLORIDE 50 ML/HR: 9 INJECTION, SOLUTION INTRAVENOUS at 08:33

## 2018-01-01 RX ADMIN — HYDRALAZINE HYDROCHLORIDE 75 MG: 50 TABLET ORAL at 23:13

## 2018-01-01 RX ADMIN — POTASSIUM CHLORIDE 40 MEQ: 750 CAPSULE, EXTENDED RELEASE ORAL at 08:57

## 2018-01-01 RX ADMIN — TAZOBACTAM SODIUM AND PIPERACILLIN SODIUM 3.38 G: 375; 3 INJECTION, SOLUTION INTRAVENOUS at 15:57

## 2018-01-01 RX ADMIN — SODIUM CHLORIDE 75 ML/HR: 9 INJECTION, SOLUTION INTRAVENOUS at 16:17

## 2018-01-01 RX ADMIN — TAZOBACTAM SODIUM AND PIPERACILLIN SODIUM 3.38 G: 375; 3 INJECTION, SOLUTION INTRAVENOUS at 09:25

## 2018-01-01 RX ADMIN — HYDRALAZINE HYDROCHLORIDE 75 MG: 50 TABLET ORAL at 20:08

## 2018-01-01 RX ADMIN — ENOXAPARIN SODIUM 30 MG: 30 INJECTION SUBCUTANEOUS at 20:19

## 2018-01-01 RX ADMIN — LIDOCAINE HYDROCHLORIDE 0.5 ML: 10 INJECTION, SOLUTION EPIDURAL; INFILTRATION; INTRACAUDAL; PERINEURAL at 09:43

## 2018-01-01 RX ADMIN — POTASSIUM CHLORIDE 40 MEQ: 750 CAPSULE, EXTENDED RELEASE ORAL at 21:03

## 2018-01-01 RX ADMIN — FUROSEMIDE 80 MG: 10 INJECTION, SOLUTION INTRAMUSCULAR; INTRAVENOUS at 08:49

## 2018-01-01 RX ADMIN — TAZOBACTAM SODIUM AND PIPERACILLIN SODIUM 3.38 G: 375; 3 INJECTION, SOLUTION INTRAVENOUS at 00:49

## 2018-01-01 RX ADMIN — HYDRALAZINE HYDROCHLORIDE 50 MG: 50 TABLET ORAL at 11:59

## 2018-01-01 RX ADMIN — ISOSORBIDE MONONITRATE 120 MG: 120 TABLET, EXTENDED RELEASE ORAL at 08:16

## 2018-01-01 RX ADMIN — TAZOBACTAM SODIUM AND PIPERACILLIN SODIUM 3.38 G: 375; 3 INJECTION, SOLUTION INTRAVENOUS at 23:39

## 2018-01-01 RX ADMIN — HYDRALAZINE HYDROCHLORIDE 75 MG: 50 TABLET ORAL at 20:45

## 2018-01-01 RX ADMIN — HYDRALAZINE HYDROCHLORIDE 75 MG: 50 TABLET ORAL at 04:04

## 2018-01-01 RX ADMIN — SODIUM CHLORIDE 1000 ML: 9 INJECTION, SOLUTION INTRAVENOUS at 12:20

## 2018-01-01 RX ADMIN — HEPARIN SODIUM 5000 UNITS: 5000 INJECTION INTRAVENOUS; SUBCUTANEOUS at 05:42

## 2018-01-01 RX ADMIN — CEFTRIAXONE SODIUM 1 G: 1 INJECTION, POWDER, FOR SOLUTION INTRAMUSCULAR; INTRAVENOUS at 13:12

## 2018-01-01 RX ADMIN — ONDANSETRON 4 MG: 2 INJECTION INTRAMUSCULAR; INTRAVENOUS at 05:51

## 2018-01-01 RX ADMIN — ACETAMINOPHEN 1000 MG: 500 TABLET ORAL at 14:54

## 2018-01-01 RX ADMIN — POTASSIUM CHLORIDE 40 MEQ: 750 CAPSULE, EXTENDED RELEASE ORAL at 17:49

## 2018-01-01 RX ADMIN — MIDAZOLAM HYDROCHLORIDE 1 MG: 1 INJECTION, SOLUTION INTRAMUSCULAR; INTRAVENOUS at 09:00

## 2018-01-01 RX ADMIN — AMIODARONE HYDROCHLORIDE 200 MG: 200 TABLET ORAL at 20:20

## 2018-01-01 RX ADMIN — FUROSEMIDE 40 MG: 10 INJECTION, SOLUTION INTRAMUSCULAR; INTRAVENOUS at 09:26

## 2018-01-01 RX ADMIN — DEXAMETHASONE SODIUM PHOSPHATE 4 MG: 4 INJECTION, SOLUTION INTRAMUSCULAR; INTRAVENOUS at 13:23

## 2018-01-01 RX ADMIN — SOTALOL HYDROCHLORIDE 40 MG: 80 TABLET ORAL at 12:35

## 2018-01-01 RX ADMIN — TAZOBACTAM SODIUM AND PIPERACILLIN SODIUM 3.38 G: 375; 3 INJECTION, SOLUTION INTRAVENOUS at 09:32

## 2018-01-01 RX ADMIN — ALLOPURINOL 100 MG: 100 TABLET ORAL at 17:08

## 2018-01-01 RX ADMIN — FUROSEMIDE 40 MG: 40 TABLET ORAL at 09:24

## 2018-01-01 RX ADMIN — ACETAMINOPHEN 1000 MG: 500 TABLET, FILM COATED ORAL at 13:03

## 2018-01-01 RX ADMIN — AMIODARONE HYDROCHLORIDE 200 MG: 200 TABLET ORAL at 08:14

## 2018-01-01 RX ADMIN — ALLOPURINOL 100 MG: 100 TABLET ORAL at 09:03

## 2018-01-01 RX ADMIN — SODIUM CHLORIDE: 450 INJECTION, SOLUTION INTRAVENOUS at 14:35

## 2018-01-01 RX ADMIN — MORPHINE SULFATE 2 MG: 2 INJECTION, SOLUTION INTRAMUSCULAR; INTRAVENOUS at 08:55

## 2018-01-01 RX ADMIN — ALLOPURINOL 100 MG: 100 TABLET ORAL at 08:47

## 2018-01-01 RX ADMIN — ATORVASTATIN CALCIUM 10 MG: 10 TABLET, FILM COATED ORAL at 20:02

## 2018-01-01 RX ADMIN — TAZOBACTAM SODIUM AND PIPERACILLIN SODIUM 3.38 G: 375; 3 INJECTION, SOLUTION INTRAVENOUS at 23:47

## 2018-01-01 RX ADMIN — ONDANSETRON 4 MG: 2 INJECTION INTRAMUSCULAR; INTRAVENOUS at 15:00

## 2018-01-01 RX ADMIN — POTASSIUM CHLORIDE 40 MEQ: 750 CAPSULE, EXTENDED RELEASE ORAL at 14:13

## 2018-01-01 RX ADMIN — ENOXAPARIN SODIUM 30 MG: 30 INJECTION SUBCUTANEOUS at 18:57

## 2018-01-01 RX ADMIN — MORPHINE SULFATE 2 MG: 2 INJECTION, SOLUTION INTRAMUSCULAR; INTRAVENOUS at 15:32

## 2018-01-01 RX ADMIN — ATORVASTATIN CALCIUM 10 MG: 10 TABLET, FILM COATED ORAL at 21:13

## 2018-01-01 RX ADMIN — FUROSEMIDE 40 MG: 40 TABLET ORAL at 08:43

## 2018-01-01 RX ADMIN — ISOSORBIDE DINITRATE 10 MG: 10 TABLET ORAL at 06:34

## 2018-01-01 RX ADMIN — AMIODARONE HYDROCHLORIDE 1 MG/MIN: 1.8 INJECTION, SOLUTION INTRAVENOUS at 23:59

## 2018-01-01 RX ADMIN — HYDRALAZINE HYDROCHLORIDE 75 MG: 50 TABLET ORAL at 17:08

## 2018-01-01 RX ADMIN — FAMOTIDINE 20 MG: 20 TABLET, FILM COATED ORAL at 20:19

## 2018-01-01 RX ADMIN — FAMOTIDINE 20 MG: 20 TABLET, FILM COATED ORAL at 20:42

## 2018-01-01 RX ADMIN — PROMETHAZINE HYDROCHLORIDE 6.25 MG: 25 INJECTION INTRAMUSCULAR; INTRAVENOUS at 10:20

## 2018-01-01 RX ADMIN — ISOSORBIDE DINITRATE 10 MG: 10 TABLET ORAL at 23:47

## 2018-01-01 RX ADMIN — ENOXAPARIN SODIUM 30 MG: 30 INJECTION SUBCUTANEOUS at 19:44

## 2018-04-23 ENCOUNTER — HOSPITAL ENCOUNTER (EMERGENCY)
Dept: HOSPITAL 58 - ED | Age: 73
Discharge: ANOTHER HOSPITAL | End: 2018-04-23
Payer: COMMERCIAL

## 2018-04-23 VITALS — BODY MASS INDEX: 30.7 KG/M2

## 2018-04-23 VITALS — DIASTOLIC BLOOD PRESSURE: 71 MMHG | SYSTOLIC BLOOD PRESSURE: 115 MMHG | TEMPERATURE: 97.5 F

## 2018-04-23 DIAGNOSIS — K81.0: Primary | ICD-10-CM

## 2018-04-23 DIAGNOSIS — R05: ICD-10-CM

## 2018-04-23 DIAGNOSIS — Z86.73: ICD-10-CM

## 2018-04-23 DIAGNOSIS — Z95.0: ICD-10-CM

## 2018-04-23 DIAGNOSIS — I10: ICD-10-CM

## 2018-04-23 DIAGNOSIS — K21.9: ICD-10-CM

## 2018-04-23 DIAGNOSIS — R10.9: ICD-10-CM

## 2018-04-23 PROCEDURE — 82150 ASSAY OF AMYLASE: CPT

## 2018-04-23 PROCEDURE — 85610 PROTHROMBIN TIME: CPT

## 2018-04-23 PROCEDURE — 81001 URINALYSIS AUTO W/SCOPE: CPT

## 2018-04-23 PROCEDURE — 99285 EMERGENCY DEPT VISIT HI MDM: CPT

## 2018-04-23 PROCEDURE — 80053 COMPREHEN METABOLIC PANEL: CPT

## 2018-04-23 PROCEDURE — 85025 COMPLETE CBC W/AUTO DIFF WBC: CPT

## 2018-04-23 PROCEDURE — 87040 BLOOD CULTURE FOR BACTERIA: CPT

## 2018-04-23 PROCEDURE — 96365 THER/PROPH/DIAG IV INF INIT: CPT

## 2018-04-23 PROCEDURE — 83605 ASSAY OF LACTIC ACID: CPT

## 2018-04-23 PROCEDURE — 93010 ELECTROCARDIOGRAM REPORT: CPT

## 2018-04-23 PROCEDURE — 83690 ASSAY OF LIPASE: CPT

## 2018-04-23 PROCEDURE — 84145 PROCALCITONIN (PCT): CPT

## 2018-04-23 PROCEDURE — 85730 THROMBOPLASTIN TIME PARTIAL: CPT

## 2018-04-23 PROCEDURE — 36415 COLL VENOUS BLD VENIPUNCTURE: CPT

## 2018-04-23 PROCEDURE — 93005 ELECTROCARDIOGRAM TRACING: CPT

## 2018-04-23 NOTE — US
EXAM:  ULTRASOUND ABDOMEN LIMITED 

  

HISTORY:  Abdominal pain 

  

FINDINGS:  Ultrasound abdomen, limited.  Gray-scale ultrasound and color Doppler was performed.  Live
r size was normal at 12 cm. The liver parenchyma demonstrated normal sonographic appearance without e
vidence of intrahepatic biliary dilatation or focal lesion. Patent and hepatopedal main portal vein. 


  

The gallbladder is distended and has significant wall thickening at 0.54 cm.  There is gallbladder sl
udge and at least one small stone. Common bile duct diameter was approaching upper limit normal at ne
estephania 0.6 cm.  Cannot exclude a trace amount of pericholecystic ascites. 

  

Pancreas was poorly seen. 

  

  

IMPRESSION:  Gallbladder distension, sludge and stones.  Significant gallbladder wall thickening.  Co
mmon bile duct diameter upper limit normal. Possible trace amount of pericholecystic ascites.  Consid
er acute cholecystitis.

## 2018-04-23 NOTE — CT
EXAM:  CT Abdomen without contrast.  CT Pelvis without contrast. 

  

HISTORY:  Abdominal pain. 

  

COMPARISON:  Ultrasound earlier the same day. 

  

TECHNIQUE:  Multiple axial images of the abdomen and pelvis were obtained without intravenous contras
t.  Images were reformatted in the sagittal and coronal plane. 

  

  

FINDINGS:  Please note that evaluation of the abdominal and pelvic structures is limited due to lack 
of intravenous contrast. 

  

Heart is enlarged.  Pacemaker leads partially imaged.  Subsegmental atelectasis noted in the lung bas
es.  Degenerative changes present in the spine.  Bilateral spondylolysis, spondylolisthesis at L5-S1 
noted. 

  

Liver demonstrates normal contour.  Gallbladder is distended with significant wall thickening.  Calci
fied stones seen within the gallbladder.  There is pericholecystic edema.  The pancreas, spleen, adre
nal glands, and kidneys demonstrate normal contour.  There is no hydronephrosis. 

  

There is evidence for bowel obstruction.  The cecum appears in the right upper quadrant of the abdome
n.  Appendix may be located just below the inferior right hepatic lobe best seen on coronal imaging a
nd is not distended.  Diverticulosis noted. 

  

Small amount of free pelvic fluid noted.  Uterus demonstrates normal contour.  Urinary bladder is unr
emarkable.  No free air identified.  Atherosclerotic calcifications present.  Small fat-containing um
bilical hernia noted. 

  

---------------------------- 

IMPRESSION: 

  

Findings suggest acute cholecystitis.

## 2018-04-23 NOTE — ED.PDOC
General


ED Provider: 


Dr. MARY KATE WAGNER





Chief Complaint: Abdominal Pain


Stated Complaint: EPIGASTRIC PAIN


Time Seen by Physician: 11:29 (PAIN X 3 DAYS )


Mode of Arrival: Wheelchair


Information Source: Patient


Exam Limitations: No limitations


Primary Care Provider: 


MARY WANG





Nursing and Triage Documentation Reviewed and Agree: Yes (NO FEVER REPORTED )


Reviewed sepsis parameters & appropriate labs ordered?: Yes


System Inflammatory Response Syndrome: Not Applicable


Sepsis Protocol: 


For patient's 13 years and over:





Temp is 96.8 and below  and greater


Pulse >90 BPM


Resp >20/minute


Acutely Altered Mental Status





Are patient's symptoms suggestive of a new infection, such as:


   -Pneumonia


   -Skin, Soft Tissue


   -Endocarditis


   -UTI


   -Bone, Joint Infection


   -Implantable Device


   -Acute Abdominal Infection


   -Wound Infection


   -Meningitis


   -Blood Stream Catheter Infection


   -Unknown





System Inflammatory Response Syndrome: Not Applicable (NO FOOD INTAKE X 14 

HOURS  LAST BM LAST NIGHT LOOSE STOOLS)





GI Complaint Exam





- Abdominal Pain Complaint/Exam


Onset: Gradual (PAIN OVER 3 DAYS WORSE TODAY)


Duration: 3 DAYS 


Timing: Intermittent


Initial Severity: Moderate


Current Severity: Moderate


Location of Pain: RUQ


Radiates To: Reports: Chest (EPIGASTRIC )


Character: Reports: Cramping (TODAY )


Alleviating: Reports: None


Associated Signs and Symptoms: Reports: Cough, Vomiting (X1 2 DAYS AGO LOOSE 

STOOLS ).  Denies: Diaphoresis, Fever, Chest pain, Dizziness, Back pain, 

Constipation, Blood in stool, Dysuria, Urinary frequency, Decreased urine output

, Decreased appetite, Vaginal bleeding, Vaginal discharge, Nausea, Diarrhea, 

Sore throat, Decreased activity


AAA Risk Factors: Reports: Hypertension.  Denies: Prior AAA, Primary relative 

AAA, Smoking


Cardiac Risk Factors: Reports: Hypertension


Ovarian Torsion Risk Factors: Reports: None


Surgical Obstruction Risk Factors: Reports: None (BUT GUARDING )


Related Surgical History: Reports: None


Patient Rh Status: Unknown


Female Body Picture: 


  __________________________














  __________________________





 1 - GAURDING





Differential Diagnoses: Bowel Obstruction, Pancreatitis, GB


Quality Indicators for AMI: EKG in 10min.


Quality Indicators for Cardiac Chest Pain: EKG in 10min.


Quality Indicator For Non-Traumatic Chest Pain/Syncope: EKG Performed





Review of Systems





- Review Of Systems


Constitutional: Reports: No symptoms


Eyes: Reports: No symptoms


Ears, Nose, Mouth, Throat: Reports: No symptoms


Respiratory: Reports: No symptoms


Cardiac: Reports: No symptoms


GI: Reports: Abdominal pain


: Reports: No symptoms


Musculoskeletal: Reports: No symptoms


Skin: Reports: No symptoms


Neurological: Reports: No symptoms


Endocrine: Reports: No symptoms


Hematologic/Lymphatic: Reports: No symptoms


All Other Systems: Reviewed and Negative





Past Medical History





- Past Medical History


Previously Healthy: Yes


Endocrine: Reports: None


Cardiovascular: Reports: Hypertension


Respiratory: Reports: None


Hematological: Reports: Anemia


Gastrointestinal: Reports: GERD


Genitourinary: Reports: None


Neuro/Psych: Reports: TIA


Musculoskeletal: Reports: Arthritis


Cancer: Reports: None


Last Menstrual Period: NA





- Surgical History


General Surgical History: Reports: Pacemaker (defibrilltor. )





- Family History


Family History: Reports: Heart





- Social History


Smoking Status: Former smoker


Hx Substance Use: No


Alcohol Screening: None





Physical Exam





- Physical Exam


Appearance: Ill-appearing


Ill-appearing: Moderate


Pain Distress: Moderate


Eyes: KAYLA, EOMI, Conjunctiva clear


ENT: Ears normal, Nose normal, Oropharynx normal


Respiratory: Airway patent, Breath sounds clear, Breath sounds equal, 

Respirations nonlabored


Cardiovascular: RRR, Pulses normal, No rub, No murmur


GI/: No masses, Bowel sounds normal, No Organomegaly, Tender (RUQ  REOUND )


Musculoskeletal: Normal strength, ROM intact, No edema, No calf tenderness


Skin: Warm, Dry, Normal color


Neurological: Sensation intact, Motor intact, Reflexes intact, Cranial nerves 

intact, Alert, Oriented


Psychiatric: Affect appropriate, Mood appropriate





Interpretation





- Radiology Interpretation


Radiology Interpretation By: Radiologist


Radiology Results: Positive (ACUTE CHOLECYSTITIS)





- Cardiac Monitor


Rate: Normal


Rhythm: Sinus


Ectopy: PVCs





- EKG Interpretation


Rate: Normal


Rhythm: Sinus


Axis: Left (LBBB, LAFB,POOR WAVE PROGRESSION, DOES NOT MEET ANY GARBOSSA 

CRITERIA FOR ACUTE MI AT THIS TIME )


ST Segment: Other (SEE BELOW)





Physician Notification





- Case Discussed


Physician Notified: ULISES 


Time of Notification: 14:10 (TRANSFER NOW)


Physician Notified: MANUEL FLORES 


Time of Notification: 14:20 (TRANSFER )


Endorsed To/Discussed With: N





Critical Care Note





- Critical Care Note


Total Time (mins): 0





Course





- Course


Hematology/Chemistry: 


 04/23/18 12:25





 04/23/18 12:25


Orders, Labs, Meds: 


Lab Review











  04/23/18 04/23/18 04/23/18





  12:25 12:25 13:54


 


WBC  21.26 H  


 


RBC  5.12  


 


Hgb  14.8  


 


Hct  42.8  


 


MCV  83.6  


 


MCH  28.9  


 


MCHC  34.6  


 


RDW Coeff of Nori  17.7 H  


 


Plt Count  256  


 


Immature Gran % (Auto)  1.3  


 


Neut % (Auto)  89.6  


 


Lymph % (Auto)  2.3 L  


 


Mono % (Auto)  6.6  


 


Eos % (Auto)  0.1  


 


Baso % (Auto)  0.1  


 


Immature Gran # (Auto)  0.3  


 


Neut # (Auto)  19.1 H  


 


Lymph # (Auto)  0.5 L  


 


Mono # (Auto)  1.4  


 


Eos # (Auto)  0.0  


 


Baso # (Auto)  0.0  


 


PT    13.2 H


 


INR    1.33


 


APTT    29.2


 


Sodium   135 L 


 


Potassium   3.5 


 


Chloride   96 L 


 


Carbon Dioxide   26 


 


Anion Gap   16.5 


 


BUN   34 H 


 


Creatinine   1.01 


 


Estimated GFR (MDRD)   65.00 


 


BUN/Creatinine Ratio   33.66 


 


Glucose   120 H 


 


Calcium   9.8 


 


Total Bilirubin   3.3 H 


 


AST   21 


 


ALT   29 


 


Alkaline Phosphatase   99 


 


Total Protein   6.8 


 


Albumin   2.9 L 


 


Globulin   3.9 


 


Albumin/Globulin Ratio   0.74 


 


Amylase   18 L 


 


Lipase   < 4 L 








Orders











 Category Date Time Status


 


 EKG-(ED ONLY) Stat CARDIO  04/23/18 12:17 Completed


 


 EKG-(ED ONLY) Stat CARDIO  04/23/18 13:54 Ordered


 


 NPO REMINDER: IMAGING ONCE CARE  04/23/18 12:16 Active


 


 AMYLASE Stat LAB  04/23/18 12:25 Completed


 


 BLOOD CULTURE (ED ONLY) Stat LAB  04/23/18 Ordered


 


 CBC W/ AUTO DIFF Stat LAB  04/23/18 12:25 Completed


 


 COMPREHENSIVE METABOLIC PANEL Stat LAB  04/23/18 12:25 Completed


 


 LACTIC ACID Stat LAB  04/23/18 13:53 Ordered


 


 LIPASE Stat LAB  04/23/18 12:25 Completed


 


 PARTIAL THROMBOPLASTIN TIME Stat LAB  04/23/18 13:54 Ordered


 


 PROCALCITONIN Stat LAB  04/23/18 Ordered


 


 PT WITH INR Stat LAB  04/23/18 13:54 Ordered


 


 URINALYSIS C & S IF INDICATED Stat LAB  04/23/18 12:15 Uncollected


 


 Ampicillin Sodium/Sulbactam Na [Unasyn] MEDS  04/23/18 14:02 Discontinued





 3 gm .ROUTE .STK-MED ONE   


 


 Ampicillin Sodium/Sulbactam Na [Unasyn] 3 gm MEDS  04/23/18 13:53 Ordered





 0.9 % Sodium Chloride [Sodium Chloride] 100 ml   





 IV ONCE   


 


 CT ABDOMEN/PELVIS WO CONTRAST Stat RADS  04/23/18 12:15 Completed


 


 ULTRASOUND ABDOMEN, RT. UPPER QUAD [U/S ABDOMEN, RT. RADS  04/23/18 12:16 

Completed





 UPPER QUAD] Stat   








Medications











Generic Name Dose Route Start Last Admin





  Trade Name Freq  PRN Reason Stop Dose Admin


 


Ampicillin Sodium/Sulbactam  100 mls @ 100 mls/hr  04/23/18 13:53  





  Sodium 3 gm/ Sodium Chloride  IV  04/23/18 14:52  





  ONCE STA   





     





     





     





     











Vital Signs: 


 











  Temp Pulse Resp BP Pulse Ox


 


 04/23/18 11:21  98.2 F  82  20  0/0 L  95














Departure





- Departure


Time of Disposition: 15:00 (EF OF 20% WAS RELAYED TO THE SURGEON)


Disposition: TSF SHORT-TRM HOSP


Discharge Problem: 


 Abdominal pain, Acute cholecystitis





Instructions:  Cholecystitis (ED)


Condition: Good


Pt referred to PMD for follow-up: Yes


IPMP verified?: No


Additional Instructions: 


Please call your Family Physician as soon as possible to schedule a follow-up 

appointment.


Allergies/Adverse Reactions: 


Allergies





aspirin Adverse Reaction (Verified 04/23/18 11:20)


 


cholecalciferol (vitamin D3) [From Vitamin D3] Adverse Reaction (Verified 04/23/ 18 11:20)


 


ciprofloxacin Adverse Reaction (Verified 04/23/18 11:20)


 


codeine Adverse Reaction (Verified 04/23/18 11:20)


 


dexamethasone [From Decadron] Adverse Reaction (Verified 04/23/18 11:20)


 


dexamethasone sod phosphate [From Decadron] Adverse Reaction (Verified 04/23/18 

11:20)


 


ergocalciferol (vitamin D2) [From Vitamin D2] Adverse Reaction (Verified 04/23/ 18 11:20)


 constipation


fluticasone propionate [From Advair Diskus] Adverse Reaction (Verified 04/23/18 

11:20)


 


hydrochlorothiazide Adverse Reaction (Verified 04/23/18 11:20)


 angioedema


salmeterol xinafoate [From Advair Diskus] Adverse Reaction (Verified 04/23/18 11

:20)


 


spironolactone Adverse Reaction (Verified 04/23/18 11:20)


 


Sulfa (Sulfonamide Antibiotics) Adverse Reaction (Verified 04/23/18 11:20)


 








Home Medications: 


Ambulatory Orders





Clopidogrel Bisulfate [Plavix] 75 mg PO DAILY 09/01/15 


Furosemide [Lasix] 20 mg PO BID 09/01/15 


Omega-3 Fatty Acids/Fish Oil [Fish Oil 1,000 mg Capsule] 1 each PO DAILY 09/01/

15 


Pravastatin Sodium [Pravachol] 20 mg PO DAILY 09/01/15 


Metolazone 2.5 mg PO DAILY PRN 10/11/16 


Prednisone 2.5 mg PO EVERY OTHER DAY 10/11/16 


Albuterol Sulfate [Proair Hfa] 2 puff INH Q4-6H PRN 01/17/17 


L.acidoph,Paracasei, B.lactis [Probiotic] 1 cap PO DAILY 01/17/17 


Furosemide [Lasix] 10 mg PO MOWEFR 01/18/17 


Potassium Chloride [K-Tab ER] 20 meq PO DAILY #1 tablet.er 01/18/17 


Sotalol HCl [Betapace] 40 mg PO BID 01/18/17 


Allopurinol 100 mg PO DAILY 04/23/18

## 2018-04-24 PROBLEM — E78.2 MIXED HYPERLIPIDEMIA: Status: ACTIVE | Noted: 2018-01-01

## 2018-04-24 PROBLEM — Z95.1 S/P CABG X 2: Status: ACTIVE | Noted: 2018-01-01

## 2018-04-24 PROBLEM — I49.3 FREQUENT PVCS: Status: ACTIVE | Noted: 2018-01-01

## 2018-04-24 PROBLEM — I10 ESSENTIAL HYPERTENSION: Status: ACTIVE | Noted: 2018-01-01

## 2018-04-24 PROBLEM — I25.5 ISCHEMIC CARDIOMYOPATHY: Status: ACTIVE | Noted: 2018-01-01

## 2018-04-24 PROBLEM — I25.10 CORONARY ARTERY DISEASE INVOLVING NATIVE CORONARY ARTERY OF NATIVE HEART WITHOUT ANGINA PECTORIS: Status: ACTIVE | Noted: 2018-01-01

## 2018-04-24 PROBLEM — I47.29 NSVT (NONSUSTAINED VENTRICULAR TACHYCARDIA) (HCC): Status: ACTIVE | Noted: 2018-01-01

## 2018-04-24 PROBLEM — I42.0 DILATED CARDIOMYOPATHY (HCC): Status: ACTIVE | Noted: 2018-01-01

## 2018-04-25 PROBLEM — N17.9 AKI (ACUTE KIDNEY INJURY) (HCC): Status: ACTIVE | Noted: 2018-01-01

## 2018-04-26 PROBLEM — I50.23 ACUTE ON CHRONIC SYSTOLIC CONGESTIVE HEART FAILURE (HCC): Status: ACTIVE | Noted: 2018-01-01

## 2018-04-28 PROBLEM — E78.2 MIXED HYPERLIPIDEMIA: Chronic | Status: ACTIVE | Noted: 2018-01-01

## 2018-04-28 PROBLEM — I42.0 DILATED CARDIOMYOPATHY (HCC): Chronic | Status: ACTIVE | Noted: 2018-01-01

## 2018-04-28 PROBLEM — K21.9 GASTROESOPHAGEAL REFLUX DISEASE WITHOUT ESOPHAGITIS: Chronic | Status: ACTIVE | Noted: 2018-01-01

## 2018-04-28 PROBLEM — I10 ESSENTIAL HYPERTENSION: Chronic | Status: ACTIVE | Noted: 2018-01-01

## 2018-05-01 NOTE — PROGRESS NOTES
Nephrology ([facility]) Progress Note      Patient:  Evelin Leach  YOB: 1945  Date of Service: 5/1/2018  MRN: [id number]   Acct: [id number]   Primary Care Physician: Michael Rojas MD  Advance Directive: Conditional Code  Admit Date: 4/23/2018       Hospital Day: 8  Referring Provider: Joanna Munoz MD      Patient personally seen and examined.  Complete chart including Consults, Notes, Operative Reports, Labs, Cardiology, and Radiology studies reviewed as able.        Subjective:  Evelin Leach is a 72 y.o. female  whom we were consulted for acute kidney injury. No prior known renal history.  History of hypertension and chronic systolic congestive heart failure with ejection fraction <20%.  Presented to [place] ER with nausea, vomiting and abdominal pain; was transferred to [facility] for cholecystitis.  Hospital course remarkable for increased dsypnea and edema following administration of IV fluids.  poor response to diuretics.  Became more hypotensive and right heart catheterization done on 4/26 with [place] catheter placement.  Milrinone drip started but discontinued secondary to arrhythmias.  Blood pressure has since improved and PA catheter removed. Transferred to medical floor on 4/30.    Today is awake/alert.  Still with pain in left great toe.  Nauseated overnight.  Urine output nonoliguric.    Allergies:  Advair diskus [fluticasone-salmeterol]; Aspirin; Ciprofloxacin hcl; Dexamethasone; Ergocalciferol; Hydrochlorothiazide; Spironolactone; Sulfa antibiotics; and Vitamin d analogs    Home Meds:  Prescriptions Prior to Admission   Medication Sig Dispense Refill Last Dose   • albuterol (PROVENTIL HFA;VENTOLIN HFA) 108 (90 Base) MCG/ACT inhaler Inhale 2 puffs Every 4 (Four) Hours As Needed for Wheezing or Shortness of Air.      • allopurinol (ZYLOPRIM) 100 MG tablet Take 100 mg by mouth Daily.      • clopidogrel (PLAVIX) 75 MG tablet Take 75 mg by mouth Daily.       • furosemide (LASIX) 20 MG tablet Take 20 mg by mouth 2 (Two) Times a Day.      • furosemide (LASIX) 20 MG tablet Take 10 mg by mouth Take As Directed. Monday, Wednesday and Friday      • metOLazone (ZAROXOLYN) 2.5 MG tablet Take 2.5 mg by mouth Daily As Needed (PRN edema).      • Omega-3 Fatty Acids (FISH OIL) 1000 MG capsule capsule Take 1,000 mg by mouth Daily With Breakfast.      • potassium chloride (K-DUR) 10 MEQ CR tablet Take 20 mEq by mouth Daily.      • pravastatin (PRAVACHOL) 20 MG tablet Take 20 mg by mouth Every Night.      • predniSONE (DELTASONE) 2.5 MG tablet Take 2.5 mg by mouth Every Other Day.      • Probiotic Product (PROBIOTIC-10 PO) Take 1 tablet by mouth Daily.      • sotalol (BETAPACE) 80 MG tablet Take 40 mg by mouth 2 (Two) Times a Day.          Medicines:  Current Facility-Administered Medications   Medication Dose Route Frequency Provider Last Rate Last Dose   • albuterol (PROVENTIL) nebulizer solution 0.083% 2.5 mg/3mL  2.5 mg Nebulization Q6H PRN Joanna Munoz MD       • allopurinol (ZYLOPRIM) tablet 100 mg  100 mg Oral Daily Beto Wayne MD   100 mg at 05/01/18 0824   • amiodarone (PACERONE) tablet 200 mg  200 mg Oral Q12H Simeon Ca MD   200 mg at 05/01/18 0825   • atorvastatin (LIPITOR) tablet 10 mg  10 mg Oral Nightly Beto Wayne MD   10 mg at 04/30/18 2020   • famotidine (PEPCID) tablet 20 mg  20 mg Oral Daily Joanna Munoz MD   20 mg at 05/01/18 0824   • hydrALAZINE (APRESOLINE) tablet 10 mg  10 mg Oral Q8H Dayton Baker MD   Stopped at 04/30/18 2108   • isosorbide dinitrate (ISORDIL) tablet 10 mg  10 mg Oral Q8H Dayton Baker MD   10 mg at 05/01/18 0824   • Magnesium Sulfate 2 gram Bolus, followed by 8 gram infusion (total Mg dose 10 grams)- Mg less than or equal to 1mg/dL  2 g Intravenous PRN Dayton Baker MD        Or   • Magnesium Sulfate 6 gram Infusion (2 gm x 3) -Mg 1.1 -1.5 mg/dL  2 g Intravenous PRN Dayton Baker MD        Or   •  magnesium sulfate 4 gram infusion- Mg 1.6-1.9 mg/dL  4 g Intravenous PRN Dayton Baker MD       • naloxone (NARCAN) injection 0.4 mg  0.4 mg Intravenous Q5 Min PRN Joanna Munoz MD       • naloxone (NARCAN) injection 0.4 mg  0.4 mg Intravenous Q5 Min PRN Joanna Munoz MD       • ondansetron (ZOFRAN) tablet 4 mg  4 mg Oral Q6H PRN Joanna Munoz MD        Or   • ondansetron ODT (ZOFRAN-ODT) disintegrating tablet 4 mg  4 mg Oral Q6H PRN Joanna Munoz MD        Or   • ondansetron (ZOFRAN) injection 4 mg  4 mg Intravenous Q6H PRN Joanna Munoz MD   4 mg at 05/01/18 0551   • oxyCODONE (ROXICODONE) immediate release tablet 5 mg  5 mg Oral Q6H PRN Beto Wayne MD   5 mg at 04/30/18 2021   • piperacillin-tazobactam (ZOSYN) 3.375 g in iso-osmotic dextrose 50 ml (premix)  3.375 g Intravenous Q8H Joanna Munoz MD 0 mL/hr at 04/26/18 1249 3.375 g at 05/01/18 0823   • potassium chloride (MICRO-K) CR capsule 40 mEq  40 mEq Oral PRN Dayton Baker MD   40 mEq at 04/26/18 0849    Or   • potassium chloride (KLOR-CON) packet 40 mEq  40 mEq Oral PRN Dayton Baker MD        Or   • potassium chloride 10 mEq in 100 mL IVPB  10 mEq Intravenous Q1H PRN Dayton Baker MD   Stopped at 04/30/18 1750   • potassium chloride (MICRO-K) CR capsule 40 mEq  40 mEq Oral Daily Beto Wayne MD   40 mEq at 04/30/18 0934   • potassium chloride (MICRO-K) CR capsule 40 mEq  40 mEq Oral Daily Beto Wayne MD   40 mEq at 05/01/18 0824   • sodium chloride 0.9 % flush 1-10 mL  1-10 mL Intravenous PRN Joanna Munoz MD   10 mL at 05/01/18 0551       Past Medical History:  Past Medical History:   Diagnosis Date   • Arthritis    • CHF (congestive heart failure)    • GERD (gastroesophageal reflux disease)    • Hypertension    • TIA (transient ischemic attack)        Past Surgical History:  Past Surgical History:   Procedure Laterality Date   • CARDIAC CATHETERIZATION     • CARDIAC CATHETERIZATION N/A 4/26/2018     "Procedure: Right Heart Cath, possible leave-in SG. Case at 130 please;  Surgeon: Dayton Baker MD;  Location:  [facility];  Service: Cardiovascular   • CARDIAC PACEMAKER PLACEMENT         Family History  History reviewed. No pertinent family history.    Social History  Social History     Social History   • Marital status: Legally      Spouse name: N/A   • Number of children: N/A   • Years of education: N/A     Occupational History   • Not on file.     Social History Main Topics   • Smoking status: Former Smoker   • Smokeless tobacco: Never Used   • Alcohol use Not on file   • Drug use: Unknown   • Sexual activity: Defer     Other Topics Concern   • Not on file     Social History Narrative   • No narrative on file         Review of Systems:  History obtained from chart review and the patient  General ROS: No fever or chills  Respiratory ROS: No cough or shortness of breath  Cardiovascular ROS: positive for - dyspnea on exertion and edema  Gastrointestinal ROS: + nausea, no abdominal pain  Genito-Urinary ROS: No dysuria or hematuria  Neurological ROS; no headache or dizziness    Objective:  /76 (BP Location: Right arm, Patient Position: Lying)   Pulse 88   Temp 97.5 °F (36.4 °C) (Temporal Artery )   Resp 20   Ht 162.6 cm (64\")   Wt 93.1 kg (205 lb 5 oz)   SpO2 96%   BMI 35.24 kg/m²     Intake/Output Summary (Last 24 hours) at 05/01/18 0933  Last data filed at 04/30/18 2347   Gross per 24 hour   Intake              630 ml   Output                0 ml   Net              630 ml     General: awake/alert    Neck: supple, no JVD  Chest:  clear to auscultation bilaterally without respiratory distress  CVS: regular rate and rhythm  Abdominal: soft, nontender, normal bowel sounds  Extremities: trace lower ext edema  Skin: warm and dry without rash  Neuro: no focal motor deficits    Labs:    Results from last 7 days  Lab Units 05/01/18  0252 04/30/18  0456 04/29/18  0701   WBC 10*3/mm3 " 8.08 9.92 16.90*   HEMOGLOBIN g/dL 11.8* 12.7 12.2   HEMATOCRIT % 34.6* 36.3* 35.7*   PLATELETS 10*3/mm3 257 251 280           Results from last 7 days  Lab Units 05/01/18  0251 04/30/18  0456 04/29/18  0335   SODIUM mmol/L 136 135 135   POTASSIUM mmol/L 4.0 3.4* 3.6   CHLORIDE mmol/L 92* 93* 94*   CO2 mmol/L 34.0* 29.0 28.0   BUN mg/dL 62* 70* 75*   CREATININE mg/dL 1.93* 2.11* 2.48*   CALCIUM mg/dL 8.4 8.5 8.6   BILIRUBIN mg/dL 1.1* 1.1* 1.9*   ALK PHOS U/L 400* 411* 489*   ALT (SGPT) U/L 53 59* 63*   AST (SGOT) U/L 65* 66* 120*   GLUCOSE mg/dL 141* 91 153*       Radiology:   Imaging Results (last 72 hours)     Procedure Component Value Units Date/Time    US Renal Bilateral [[id number]] Collected:  04/26/18 1357     Updated:  04/26/18 1405    Narrative:       RENAL ULTRASOUND COMPLETE 4/26/2018 12:35 PM CDT     REASON FOR EXAM: acute kidney injury; I50.23-Acute on chronic systolic  (congestive) heart failure       COMPARISON: None       TECHNIQUE: Multiple longitudinal and transverse realtime sonographic  images of the kidneys and urinary bladder are obtained.      FINDINGS:      RIGHT KIDNEY: 5 x 6.4 x 11.7 cm. Normal in size, shape, contour and  position. No solid or cystic masses. The central echo complex is compact  with no evidence for hydronephrosis. No nephrolithiasis or abnormal  perinephric fluid collections . No hydroureter. .     The gallbladder is visualized and echogenic sludge is present in the  gallbladder. Common duct is 0.4 cm.     LEFT KIDNEY: 5. 5.5 x 6 x 10.9 cm. Normal in size, shape, contour and  position. No solid or cystic masses. The central echo complex is compact  with no evidence for hydronephrosis. No nephrolithiasis or abnormal  perinephric fluid collections . No hydroureter.      PELVIS: The bladder is mildly distended with anechoic urine and  demonstrates no significant wall thickening or internal echogenicities.  There is no surrounding ascites.        Impression:       1. Negative  renal ultrasound.        2. Sludge is noted in the gallbladder.        This report was finalized on 04/26/2018 14:02 by Dr. Marco Islas MD.    XR Chest 1 View [[id number]] Collected:  04/25/18 1423     Updated:  04/25/18 1426    Narrative:       EXAMINATION:   XR CHEST 1 VW-  4/25/2018 2:23 PM CDT     HISTORY: Short of air     Frontal upright radiograph of the chest 4/25/2018 1:10 PM CDT     COMPARISON: April 24, 2018.     FINDINGS:   The lungs are clear. Cardiac silhouettes markedly enlarged. Pacing  device present soft tissues left chest.      The osseous structures and surrounding soft tissues demonstrate no acute  abnormality.       Impression:       1. Marked cardiomegaly no evidence of pulmonary vascular congestion.        This report was finalized on 04/25/2018 14:23 by Dr. Marco Islas MD.    XR Chest 1 View [[id number]] Collected:  04/24/18 2035     Updated:  04/24/18 2045    Narrative:       Exam:   XR CHEST 1 VW-       Date:  4/24/2018      History:  Female, age  72 years;soa     COMPARISON:  None.     Findings :  There is a left-sided cardiac device in place. Cardiomegaly, moderate to  severe. Low lung volumes. The left lung base is not visualized. The  right lung appears clear.       Impression:       Impression:     Moderate to severe cardiomegaly.  Not visualized left lung base. This may reflect atelectasis and/or  pleural effusion.     This report was finalized on 04/24/2018 20:42 by Dr. Leanna Carballo MD.          Culture:  Urine Culture   Date Value Ref Range Status   04/26/2018 No growth at 24 hours  Preliminary         Assessment   1.  Acute kidney injury from cardiorenal syndrome--improving  2.  Baseline of chronic kidney disease stage 3  3.  Acute on chronic systolic congestive heart failure  4.  Benign hypertension now hypotension  5.  Hypokalemia  6.  Hyponatremia--improved   7.  Metabolic acidosis    Plan:  1.  Continue PRN pain medication for gout flare  2.  Monitor labs      Tejinder FRANCES  OSCAR Grimm  5/1/2018  9:33 AM

## 2018-05-01 NOTE — PROGRESS NOTES
[facility]  INPATIENT PROGRESS NOTE    Length of Stay: 8  Date of Admission: 4/23/2018  Primary Care Physician: Michael Rojas MD    Subjective   Chief Complaint: Shortness of breath/weakness/left foot pain    HPI   Patient still remained very weak.  Left foot pains much improved.  Kidney function continued to improve.  Patient complain of extreme nausea today, continue Zofran-increase frequency, increase Pepcid to IV.    Review of Systems   Constitutional: Positive for activity change, appetite change and fatigue. Negative for chills and fever.   HENT: Negative for hearing loss, nosebleeds, tinnitus and trouble swallowing.    Eyes: Negative for visual disturbance.   Respiratory: Negative for cough, chest tightness, shortness of breath and wheezing.    Cardiovascular: Negative for chest pain, palpitations and leg swelling.   Gastrointestinal: Negative for abdominal distention, abdominal pain, blood in stool, constipation, diarrhea, nausea and vomiting.   Endocrine: Negative for cold intolerance, heat intolerance, polydipsia, polyphagia and polyuria.   Genitourinary: Negative for decreased urine volume, difficulty urinating, dysuria, flank pain, frequency and hematuria.   Musculoskeletal: Positive for arthralgias, gait problem, joint swelling and myalgias.   Skin: Negative for rash.   Allergic/Immunologic: Negative for immunocompromised state.   Neurological: Positive for weakness. Negative for dizziness, syncope, light-headedness and headaches.   Hematological: Negative for adenopathy. Does not bruise/bleed easily.   Psychiatric/Behavioral: Negative for confusion and sleep disturbance. The patient is not nervous/anxious.           All pertinent negatives and positives are as above. All other systems have been reviewed and are negative unless otherwise stated.     Objective    Temp:  [96.3 °F (35.7 °C)-98.2 °F (36.8 °C)] 97.5 °F (36.4 °C)  Heart Rate:  [67-88]  84  Resp:  [18-20] 18  BP: (100-134)/(59-83) 100/76    Intake/Output Summary (Last 24 hours) at 05/01/18 1114  Last data filed at 05/01/18 0947   Gross per 24 hour   Intake              630 ml   Output                0 ml   Net              630 ml     Physical Exam  Constitutional: She is oriented to person, place, and time. She appears well-developed and well-nourished.   HENT:   Head: Normocephalic and atraumatic.   Eyes: Conjunctivae and EOM are normal. Pupils are equal, round, and reactive to light.   Neck: Neck supple. No JVD present. No thyromegaly present.   Cardiovascular: Normal rate, regular rhythm, normal heart sounds and intact distal pulses.  Exam reveals no gallop and no friction rub.    No murmur heard.  Pulmonary/Chest: Effort normal and breath sounds normal. No respiratory distress. She has no wheezes. She has no rales. She exhibits no tenderness.   Diminished breath sound bilateral   Abdominal: Soft. Bowel sounds are normal. She exhibits no distension. There is no tenderness. There is no rebound and no guarding.   Musculoskeletal: She exhibits edema (trace edema bilateral) and tenderness. She exhibits no deformity.   Left foot pain.  Gout flareup.   Lymphadenopathy:     She has no cervical adenopathy.   Neurological: She is alert and oriented to person, place, and time. She displays normal reflexes. No cranial nerve deficit. She exhibits abnormal muscle tone. Coordination abnormal.   Skin: Skin is warm and dry. No rash noted.   Psychiatric: She has a normal mood and affect. Her behavior is normal. Judgment and thought content normal.   Nursing note and vitals reviewed.  Results Review:  Lab Results (last 24 hours)     Procedure Component Value Units Date/Time    POC Glucose Once [[id number]]  (Abnormal) Collected:  05/01/18 0545    Specimen:  Blood Updated:  05/01/18 0557     Glucose 144 (H) mg/dL      Comment: : [id number] Gregorio TulioelleMeter ID: [id number]       Comprehensive Metabolic  Panel [[id number]]  (Abnormal) Collected:  05/01/18 0251    Specimen:  Blood Updated:  05/01/18 0340     Glucose 141 (H) mg/dL      BUN 62 (H) mg/dL      Creatinine 1.93 (H) mg/dL      Sodium 136 mmol/L      Potassium 4.0 mmol/L      Chloride 92 (L) mmol/L      CO2 34.0 (H) mmol/L      Calcium 8.4 mg/dL      Total Protein 6.4 g/dL      Albumin 3.10 (L) g/dL      ALT (SGPT) 53 U/L      AST (SGOT) 65 (H) U/L      Alkaline Phosphatase 400 (H) U/L      Total Bilirubin 1.1 (H) mg/dL      eGFR Non African Amer 26 (L) mL/min/1.73      Globulin 3.3 gm/dL      A/G Ratio 0.9 (L) g/dL      BUN/Creatinine Ratio 32.1 (H)     Anion Gap 10.0 mmol/L     Narrative:       The MDRD GFR formula is only valid for adults with stable renal function between ages 18 and 70.    Magnesium [[id number]]  (Normal) Collected:  05/01/18 0251    Specimen:  Blood Updated:  05/01/18 0340     Magnesium 2.2 mg/dL     CBC & Differential [[id number]] Collected:  05/01/18 0252    Specimen:  Blood Updated:  05/01/18 0321    Narrative:       The following orders were created for panel order CBC & Differential.  Procedure                               Abnormality         Status                     ---------                               -----------         ------                     CBC Auto Differential[[id number]]        Abnormal            Final result                 Please view results for these tests on the individual orders.    CBC Auto Differential [[id number]]  (Abnormal) Collected:  05/01/18 0252    Specimen:  Blood Updated:  05/01/18 0321     WBC 8.08 10*3/mm3      RBC 4.21 10*6/mm3      Hemoglobin 11.8 (L) g/dL      Hematocrit 34.6 (L) %      MCV 82.2 fL      MCH 28.0 pg      MCHC 34.1 g/dL      RDW 17.2 (H) %      RDW-SD 50.4 fl      MPV 9.6 fL      Platelets 257 10*3/mm3      Neutrophil % 92.4 (H) %      Lymphocyte % 4.0 (L) %      Monocyte % 2.0 (L) %      Eosinophil % 0.1 %      Basophil % 0.1 %      Immature Grans % 1.4 %      Neutrophils,  Absolute 7.47 10*3/mm3      Lymphocytes, Absolute 0.32 (L) 10*3/mm3      Monocytes, Absolute 0.16 (L) 10*3/mm3      Eosinophils, Absolute 0.01 10*3/mm3      Basophils, Absolute 0.01 10*3/mm3      Immature Grans, Absolute 0.11 (H) 10*3/mm3      nRBC 0.2 (H) /100 WBC     Blood Culture With NOEMI - Blood, [[id number]]  (Normal) Collected:  04/29/18 1031    Specimen:  Blood from Arm, Left Updated:  04/30/18 1130     Blood Culture No growth at 24 hours           Cultures:  Blood Culture   Date Value Ref Range Status   04/29/2018 No growth at 24 hours  Preliminary     Urine Culture   Date Value Ref Range Status   04/26/2018 No growth at 2 days  Final       Radiology Data:    Imaging Results (last 24 hours)     ** No results found for the last 24 hours. **          Allergies   Allergen Reactions   • Advair Diskus [Fluticasone-Salmeterol] Unknown (See Comments)     Unknown   • Aspirin Unknown (See Comments)     Unknown   • Ciprofloxacin Hcl Unknown (See Comments)     Unknown   • Dexamethasone Unknown (See Comments)     Unknown; 04/30/18: Discussed reported dexamethasone allergy with patient; no severe reaction recalled (i.e., patient denies experiencing any difficulty breathing, severe rash, or swelling). Patient states she tolerates prednisone at doses below 10 mg/day (reports upset stomach and headache with higher doses) -- Eugene Corley [facility] PharmD     • Ergocalciferol Unknown (See Comments)     Unknown   • Hydrochlorothiazide Unknown (See Comments)     Unknown   • Spironolactone Unknown (See Comments)     Unknown   • Sulfa Antibiotics Unknown (See Comments)     Unknown   • Vitamin D Analogs Unknown (See Comments)     Unknown       Scheduled meds:     allopurinol 100 mg Oral Daily   amiodarone 200 mg Oral Q12H   atorvastatin 10 mg Oral Nightly   famotidine 20 mg Intravenous BID   hydrALAZINE 10 mg Oral Q8H   isosorbide dinitrate 10 mg Oral Q8H   piperacillin-tazobactam 3.375 g Intravenous Q8H       PRN meds:  •   albuterol  •  magnesium sulfate **OR** magnesium sulfate **OR** magnesium sulfate  •  [DISCONTINUED] Morphine **AND** naloxone  •  [DISCONTINUED] Morphine **AND** naloxone  •  [DISCONTINUED] ondansetron **OR** [DISCONTINUED] ondansetron ODT **OR** ondansetron  •  oxyCODONE  •  potassium chloride **OR** potassium chloride **OR** potassium chloride  •  sodium chloride    Assessment/Plan     Principal Problem:    Acute cholecystitis  Active Problems:    Essential hypertension    Mixed hyperlipidemia    Dilated cardiomyopathy    NSVT (nonsustained ventricular tachycardia)    Acute kidney injury    Acute on chronic systolic congestive heart failure    Gastroesophageal reflux disease without esophagitis      Plan:  Acute on Chronic Systolic CHF-Ejection fraction 20%.  Consult cardiology.  Fluid restriction.  Strict in and out.  Telemetry.      Frequent PVCs/SVT-held Sotalol.   Amiodarone and isosorbide nitrate.  Replete potassium 4 and magnesium 2. Per cardiology.       CVD-Plavix held by general surgery.  Continue Lipitor.     ?cholecystitis vs Nutmeg Liver- on Zosyn.  Leukocytosis, improving.  Per general surgery.     Acute renal failure-improving.  Acute tubular necrosis and hypoperfusion/cardiorenal syndrome.  Nephrology is on board.     GERD- Pepcid and zofran     Gout flareup-colchicine ×1 by nephrology.  Dexamethasone 4 mg IM.     Blood culture no growth in 24 hours.  Urine culture no growth in 2 days.     Discharge Plannin-3 days    Micha Loving MD   18   11:14 AM

## 2018-05-01 NOTE — PLAN OF CARE
Problem: Patient Care Overview  Goal: Plan of Care Review  Outcome: Ongoing (interventions implemented as appropriate)   05/01/18 0600   Coping/Psychosocial   Plan of Care Reviewed With patient;family   Plan of Care Review   Progress improving   OTHER   Outcome Summary Held hydralazine at 2000 and 0400 d/t low BP. -110s. Gave PRN Roxicodone for left foot pn r/t gout x 1 last night. Pt c/o nausea this AM - medicated with IV Zofran. Pt continues to receive IV Zosyn extended. New IV placed. SR on tele with frequent PVCs.        Problem: Fall Risk (Adult)  Goal: Absence of Fall  Outcome: Ongoing (interventions implemented as appropriate)      Problem: Pain, Acute (Adult)  Goal: Acceptable Pain Control/Comfort Level  Outcome: Ongoing (interventions implemented as appropriate)      Problem: Skin Injury Risk (Adult)  Goal: Skin Health and Integrity  Outcome: Ongoing (interventions implemented as appropriate)      Problem: Cardiac Output Decreased (Adult)  Goal: Effective Tissue Perfusion  Outcome: Ongoing (interventions implemented as appropriate)

## 2018-05-01 NOTE — PLAN OF CARE
Problem: Patient Care Overview  Goal: Plan of Care Review  Outcome: Ongoing (interventions implemented as appropriate)   05/01/18 4152   Coping/Psychosocial   Plan of Care Reviewed With patient   OTHER   Outcome Summary Pt intake has improved, but she stated she feels queezy. Will cont to monitor and assess.

## 2018-05-01 NOTE — PROGRESS NOTES
[facility] -  Progress Note     LOS: 8 days   Patient Care Team:  Michael Rojas MD as PCP - General (Internal Medicine)    Chief Complaint: Follow-up decompensated heart failure    Subjective     Interval History: Patient reports breathing is much improved. Left foot pain- improved. Complains of nausea and vomiting since yesterday evening. Denies abdominal Pain.     Review of Systems:  Review of Systems   Constitution: Positive for weakness and malaise/fatigue.   Cardiovascular: Positive for orthopnea. Negative for chest pain, claudication, dyspnea on exertion, leg swelling, near-syncope, palpitations, paroxysmal nocturnal dyspnea and syncope.   Respiratory: Negative for shortness of breath.    Hematologic/Lymphatic: Does not bruise/bleed easily.   Gastrointestinal: Positive for abdominal pain.       Vital Sign Min/Max for last 24 hours  Temp  Min: 96.3 °F (35.7 °C)  Max: 98.2 °F (36.8 °C)   BP  Min: 100/76  Max: 134/59   Pulse  Min: 67  Max: 88   Resp  Min: 18  Max: 20   SpO2  Min: 96 %  Max: 98 %   No Data Recorded   Weight  Min: 93.1 kg (205 lb 5 oz)  Max: 93.1 kg (205 lb 5 oz)     1    04/30/18  1343   Weight: 93.1 kg (205 lb 5 oz)       Physical Exam:   Physical Exam   Constitutional: No distress.   Neck: No JVD present.   Cardiovascular: Regular rhythm.  Frequent extrasystoles are present. Exam reveals no gallop.    No murmur heard.  Pulmonary/Chest: Effort normal.   No obvious crackles/wheezes   Skin: Skin is cool and dry.       Medication Review: yes  Current Facility-Administered Medications   Medication Dose Route Frequency Provider Last Rate Last Dose   • albuterol (PROVENTIL) nebulizer solution 0.083% 2.5 mg/3mL  2.5 mg Nebulization Q6H PRN Joanna Munoz MD       • allopurinol (ZYLOPRIM) tablet 100 mg  100 mg Oral Daily Beto Wayne MD   100 mg at 05/01/18 0824   • amiodarone (PACERONE) tablet 200 mg  200 mg Oral Q12H Simeon Ca MD   200 mg at 05/01/18 0825   •  atorvastatin (LIPITOR) tablet 10 mg  10 mg Oral Nightly Beto Wayne MD   10 mg at 04/30/18 2020   • famotidine (PEPCID) tablet 20 mg  20 mg Oral Daily Joanna Munoz MD   20 mg at 05/01/18 0824   • hydrALAZINE (APRESOLINE) tablet 10 mg  10 mg Oral Q8H Dayton Baker MD   Stopped at 04/30/18 2108   • isosorbide dinitrate (ISORDIL) tablet 10 mg  10 mg Oral Q8H Dayton Baker MD   10 mg at 05/01/18 0824   • Magnesium Sulfate 2 gram Bolus, followed by 8 gram infusion (total Mg dose 10 grams)- Mg less than or equal to 1mg/dL  2 g Intravenous PRN Dayton Baker MD        Or   • Magnesium Sulfate 6 gram Infusion (2 gm x 3) -Mg 1.1 -1.5 mg/dL  2 g Intravenous PRN Dayton Baker MD        Or   • magnesium sulfate 4 gram infusion- Mg 1.6-1.9 mg/dL  4 g Intravenous PRN Dayton Baker MD       • naloxone (NARCAN) injection 0.4 mg  0.4 mg Intravenous Q5 Min PRN Joanna Munoz MD       • naloxone (NARCAN) injection 0.4 mg  0.4 mg Intravenous Q5 Min PRN Joanna Munoz MD       • ondansetron (ZOFRAN) tablet 4 mg  4 mg Oral Q6H PRN Joanna Munoz MD        Or   • ondansetron ODT (ZOFRAN-ODT) disintegrating tablet 4 mg  4 mg Oral Q6H PRN Joanna Munoz MD        Or   • ondansetron (ZOFRAN) injection 4 mg  4 mg Intravenous Q6H PRN Joanna Munoz MD   4 mg at 05/01/18 0551   • oxyCODONE (ROXICODONE) immediate release tablet 5 mg  5 mg Oral Q6H PRN Beto Wayne MD   5 mg at 04/30/18 2021   • piperacillin-tazobactam (ZOSYN) 3.375 g in iso-osmotic dextrose 50 ml (premix)  3.375 g Intravenous Q8H Joanna Munoz MD 0 mL/hr at 04/26/18 1249 3.375 g at 05/01/18 0823   • potassium chloride (MICRO-K) CR capsule 40 mEq  40 mEq Oral PRN Dayton Baker MD   40 mEq at 04/26/18 0849    Or   • potassium chloride (KLOR-CON) packet 40 mEq  40 mEq Oral PRN Dayton Baker MD        Or   • potassium chloride 10 mEq in 100 mL IVPB  10 mEq Intravenous Q1H PRN Dayton Baker MD   Stopped at 04/30/18 1750   • potassium  chloride (MICRO-K) CR capsule 40 mEq  40 mEq Oral Daily Beto Wayne MD   40 mEq at 04/30/18 0934   • potassium chloride (MICRO-K) CR capsule 40 mEq  40 mEq Oral Daily Beto Wayne MD   40 mEq at 05/01/18 0824   • sodium chloride 0.9 % flush 1-10 mL  1-10 mL Intravenous PRN Joanna Munoz MD   10 mL at 05/01/18 0551         Results Review:   Lab Results (last 24 hours)     Procedure Component Value Units Date/Time    POC Glucose Once [[id number]]  (Abnormal) Collected:  05/01/18 0545    Specimen:  Blood Updated:  05/01/18 0557     Glucose 144 (H) mg/dL      Comment: : [id number] Darline Freed ID: [id number]       Comprehensive Metabolic Panel [[id number]]  (Abnormal) Collected:  05/01/18 0251    Specimen:  Blood Updated:  05/01/18 0340     Glucose 141 (H) mg/dL      BUN 62 (H) mg/dL      Creatinine 1.93 (H) mg/dL      Sodium 136 mmol/L      Potassium 4.0 mmol/L      Chloride 92 (L) mmol/L      CO2 34.0 (H) mmol/L      Calcium 8.4 mg/dL      Total Protein 6.4 g/dL      Albumin 3.10 (L) g/dL      ALT (SGPT) 53 U/L      AST (SGOT) 65 (H) U/L      Alkaline Phosphatase 400 (H) U/L      Total Bilirubin 1.1 (H) mg/dL      eGFR Non African Amer 26 (L) mL/min/1.73      Globulin 3.3 gm/dL      A/G Ratio 0.9 (L) g/dL      BUN/Creatinine Ratio 32.1 (H)     Anion Gap 10.0 mmol/L     Narrative:       The MDRD GFR formula is only valid for adults with stable renal function between ages 18 and 70.    Magnesium [[id number]]  (Normal) Collected:  05/01/18 0251    Specimen:  Blood Updated:  05/01/18 0340     Magnesium 2.2 mg/dL     CBC & Differential [[id number]] Collected:  05/01/18 0252    Specimen:  Blood Updated:  05/01/18 0321    Narrative:       The following orders were created for panel order CBC & Differential.  Procedure                               Abnormality         Status                     ---------                               -----------         ------                     CBC Auto  Differential[[id number]]        Abnormal            Final result                 Please view results for these tests on the individual orders.    CBC Auto Differential [[id number]]  (Abnormal) Collected:  05/01/18 0252    Specimen:  Blood Updated:  05/01/18 0321     WBC 8.08 10*3/mm3      RBC 4.21 10*6/mm3      Hemoglobin 11.8 (L) g/dL      Hematocrit 34.6 (L) %      MCV 82.2 fL      MCH 28.0 pg      MCHC 34.1 g/dL      RDW 17.2 (H) %      RDW-SD 50.4 fl      MPV 9.6 fL      Platelets 257 10*3/mm3      Neutrophil % 92.4 (H) %      Lymphocyte % 4.0 (L) %      Monocyte % 2.0 (L) %      Eosinophil % 0.1 %      Basophil % 0.1 %      Immature Grans % 1.4 %      Neutrophils, Absolute 7.47 10*3/mm3      Lymphocytes, Absolute 0.32 (L) 10*3/mm3      Monocytes, Absolute 0.16 (L) 10*3/mm3      Eosinophils, Absolute 0.01 10*3/mm3      Basophils, Absolute 0.01 10*3/mm3      Immature Grans, Absolute 0.11 (H) 10*3/mm3      nRBC 0.2 (H) /100 WBC     Blood Culture With NOEMI - Blood, [[id number]]  (Normal) Collected:  04/29/18 1031    Specimen:  Blood from Arm, Left Updated:  04/30/18 1130     Blood Culture No growth at 24 hours          Results from last 7 days  Lab Units 05/01/18  0251 04/30/18  0456 04/29/18  0335   SODIUM mmol/L 136 135 135   POTASSIUM mmol/L 4.0 3.4* 3.6   CHLORIDE mmol/L 92* 93* 94*   CO2 mmol/L 34.0* 29.0 28.0   BUN mg/dL 62* 70* 75*   CREATININE mg/dL 1.93* 2.11* 2.48*   GLUCOSE mg/dL 141* 91 153*   CALCIUM mg/dL 8.4 8.5 8.6         I have reviewed telemetry, which shows NSR w/frequent PVCs and NSVT    Assessment/Plan     Principal Problem:  1. Dilated cardiomyopathy  with low output heart failure (acute on chronic systolic congestive heart failure).   Cardiac index <1L/min/m2 on RHC      -Milrinone stopped secondary to nonsustained V-Tach    -ISDN and hydralazine started yesterday- unfortunately ISDN held                 Active Problems requiring active management:  2. Frequent PVCs: Amiodarone started-  asymptomatic. Tolerating Amiodarone thus far    3.  NSVT (nonsustained ventricular tachycardia):    -keep K>4 and Mg>2 - Patient receiving 80 mg of Potassium oral daily              - Amiodarone Infusion yesterday and started on  mg BID     4. BEE (acute kidney injury): Nephrology following.        Other active or stable conditions:   Acute cholecystitis - nausea and vomiting    Essential hypertension    Mixed hyperlipidemia    Gout - Colchicine started per neprhology    OSCAR Muse  05/01/18  10:11 AM

## 2018-05-02 NOTE — PROGRESS NOTES
[facility] -  Progress Note     LOS: 9 days   Patient Care Team:  Michael Rojas MD as PCP - General (Internal Medicine)    Chief Complaint: abdominal pain    Subjective     Interval History: BP tolerated dose increases of hydralazine and isordil. One 6 beat run of NSVT this am. Potassium 3.6. Replaced per protocol. Continues with frequent PVCs. Creatinine improved to 1.76 this am. AST improved to 56. Alkaline phosphatase improved to 323. Denies any nausea or vomiting today. Reports chronic dyspnea with exertion, orthopnea and bilateral lower extremity edema but reports these are at baseline. Denies chest pain, palpitations, dizziness, syncope or PND.     Patient Complaints: left foot pain-improving        Review of Systems:     Review of Systems   Constitutional: Negative for chills, fatigue and fever.   HENT: Negative.    Eyes: Negative.    Respiratory: Negative for cough, chest tightness, shortness of breath, wheezing and stridor.    Cardiovascular: Positive for leg swelling. Negative for chest pain and palpitations.   Gastrointestinal: Negative for abdominal distention, abdominal pain, blood in stool, constipation, diarrhea, nausea and vomiting.   Endocrine: Negative.    Genitourinary: Negative for difficulty urinating, dysuria, flank pain and hematuria.   Musculoskeletal: Negative.         Left foot pain   Skin: Negative for rash and wound.   Allergic/Immunologic: Negative.    Neurological: Negative for dizziness, syncope, weakness, light-headedness and headaches.   Hematological: Does not bruise/bleed easily.   Psychiatric/Behavioral: Negative for agitation, behavioral problems, confusion, hallucinations, sleep disturbance and suicidal ideas. The patient is not nervous/anxious.      Objective     Vital Sign Min/Max for last 24 hours  Temp  Min: 97 °F (36.1 °C)  Max: 97.9 °F (36.6 °C)   BP  Min: 114/64  Max: 122/69   Pulse  Min: 66  Max: 84   Resp  Min: 16  Max: 20   SpO2  Min: 93 %   Max: 100 %   Flow (L/min)  Min: 1  Max: 2   Weight  Min: 86 kg (189 lb 9 oz)  Max: 86 kg (189 lb 9 oz)     Telemetry: SR 79-94, BBB, Frequent PVCs, couplets, 6 beat run of NSVT     1    05/01/18 2000   Weight: 86 kg (189 lb 9 oz)     1    04/29/18  0640 04/30/18  1343 05/01/18 2000   Weight: 85.7 kg (189 lb) 93.1 kg (205 lb 5 oz) 86 kg (189 lb 9 oz)       Intake/Output Summary (Last 24 hours) at 05/02/18 1154  Last data filed at 05/02/18 1100   Gross per 24 hour   Intake              110 ml   Output              600 ml   Net             -490 ml         Physical Exam:    Physical Exam   Constitutional: She is oriented to person, place, and time. Vital signs are normal. She appears well-developed and well-nourished. No distress.   HENT:   Head: Normocephalic and atraumatic.   Right Ear: External ear normal.   Left Ear: External ear normal.   Nose: Nose normal.   Eyes: Conjunctivae are normal. Pupils are equal, round, and reactive to light. Right eye exhibits no discharge. Left eye exhibits no discharge.   Neck: Normal range of motion. Neck supple. No JVD present. Carotid bruit is not present. No tracheal deviation present. No thyromegaly present.   Cardiovascular: Normal rate, normal heart sounds, intact distal pulses and normal pulses.  An irregular rhythm present. PMI is not displaced.  Exam reveals no gallop and no friction rub.    No murmur heard.  Pulses:       Radial pulses are 2+ on the right side, and 2+ on the left side.        Dorsalis pedis pulses are 2+ on the right side, and 2+ on the left side.        Posterior tibial pulses are 2+ on the right side, and 2+ on the left side.   Pulmonary/Chest: Effort normal and breath sounds normal. No respiratory distress. She has no decreased breath sounds. She has no wheezes. She has no rhonchi. She has no rales. She exhibits no tenderness.   Abdominal: Soft. She exhibits no distension. There is no tenderness.   Musculoskeletal: Normal range of motion. She exhibits  no edema (Moderate bilateral lower extremity edema), tenderness or deformity.   Neurological: She is alert and oriented to person, place, and time.   Skin: Skin is warm and dry. No rash noted. She is not diaphoretic. No erythema. No pallor.   Psychiatric: She has a normal mood and affect. Her behavior is normal. Judgment and thought content normal.   Vitals reviewed.    Results Review:   Lab Results (last 72 hours)     Procedure Component Value Units Date/Time    Blood Culture With NOEMI - Blood, [[id number]]  (Normal) Collected:  04/29/18 1031    Specimen:  Blood from Arm, Left Updated:  05/02/18 1130     Blood Culture No growth at 3 days    Urine Culture - Urine, Urine, Clean Catch [[id number]]  (Normal) Collected:  05/01/18 2122    Specimen:  Urine from Urine, Clean Catch Updated:  05/02/18 0639     Urine Culture No growth at 24 hours    Comprehensive Metabolic Panel [[id number]]  (Abnormal) Collected:  05/02/18 0511    Specimen:  Blood Updated:  05/02/18 0601     Glucose 118 (H) mg/dL      BUN 61 (H) mg/dL      Creatinine 1.76 (H) mg/dL      Sodium 139 mmol/L      Potassium 3.6 mmol/L      Chloride 94 (L) mmol/L      CO2 34.0 (H) mmol/L      Calcium 8.7 mg/dL      Total Protein 6.3 g/dL      Albumin 3.10 (L) g/dL      ALT (SGPT) 48 U/L      AST (SGOT) 56 (H) U/L      Alkaline Phosphatase 323 (H) U/L      Total Bilirubin 0.9 mg/dL      eGFR Non African Amer 28 (L) mL/min/1.73      Globulin 3.2 gm/dL      A/G Ratio 1.0 (L) g/dL      BUN/Creatinine Ratio 34.7 (H)     Anion Gap 11.0 mmol/L     Narrative:       The MDRD GFR formula is only valid for adults with stable renal function between ages 18 and 70.    CBC & Differential [[id number]] Collected:  05/02/18 0511    Specimen:  Blood Updated:  05/02/18 0539    Narrative:       The following orders were created for panel order CBC & Differential.  Procedure                               Abnormality         Status                     ---------                                -----------         ------                     CBC Auto Differential[[id number]]        Abnormal            Final result                 Please view results for these tests on the individual orders.    CBC Auto Differential [[id number]]  (Abnormal) Collected:  05/02/18 0511    Specimen:  Blood Updated:  05/02/18 0539     WBC 12.50 (H) 10*3/mm3      RBC 4.37 10*6/mm3      Hemoglobin 12.1 g/dL      Hematocrit 35.6 (L) %      MCV 81.5 (L) fL      MCH 27.7 (L) pg      MCHC 34.0 g/dL      RDW 17.5 (H) %      RDW-SD 50.4 fl      MPV 9.8 fL      Platelets 284 10*3/mm3      Neutrophil % 89.4 (H) %      Lymphocyte % 2.9 (L) %      Monocyte % 5.8 %      Eosinophil % 0.1 %      Basophil % 0.1 %      Immature Grans % 1.7 %      Neutrophils, Absolute 11.19 (H) 10*3/mm3      Lymphocytes, Absolute 0.36 (L) 10*3/mm3      Monocytes, Absolute 0.72 10*3/mm3      Eosinophils, Absolute 0.01 10*3/mm3      Basophils, Absolute 0.01 10*3/mm3      Immature Grans, Absolute 0.21 (H) 10*3/mm3      nRBC 0.2 (H) /100 WBC     Urinalysis, Microscopic Only - Urine, Clean Catch [[id number]]  (Abnormal) Collected:  05/01/18 2122    Specimen:  Urine from Urine, Clean Catch Updated:  05/01/18 2139     RBC, UA 3-5 (A) /HPF      WBC, UA 13-20 (A) /HPF      Bacteria, UA None Seen /HPF      Squamous Epithelial Cells, UA 3-6 (A) /HPF      Hyaline Casts, UA 0-2 /LPF      Methodology Automated Microscopy    Urinalysis With / Culture If Indicated - Urine, Clean Catch [[id number]]  (Abnormal) Collected:  05/01/18 2122    Specimen:  Urine from Urine, Clean Catch Updated:  05/01/18 2139     Color, UA Yellow     Appearance, UA Cloudy (A)     pH, UA <=5.0     Specific Gravity, UA 1.019     Glucose, UA Negative     Ketones, UA Negative     Bilirubin, UA Negative     Blood, UA Trace (A)     Protein, UA 30 mg/dL (1+) (A)     Leuk Esterase, UA Moderate (2+) (A)     Nitrite, UA Negative     Urobilinogen, UA 0.2 E.U./dL    POC Glucose Once [[id number]]  (Abnormal) Collected:   05/01/18 0545    Specimen:  Blood Updated:  05/01/18 0557     Glucose 144 (H) mg/dL      Comment: : [id number] Darline Freed ID: [id number]       Comprehensive Metabolic Panel [[id number]]  (Abnormal) Collected:  05/01/18 0251    Specimen:  Blood Updated:  05/01/18 0340     Glucose 141 (H) mg/dL      BUN 62 (H) mg/dL      Creatinine 1.93 (H) mg/dL      Sodium 136 mmol/L      Potassium 4.0 mmol/L      Chloride 92 (L) mmol/L      CO2 34.0 (H) mmol/L      Calcium 8.4 mg/dL      Total Protein 6.4 g/dL      Albumin 3.10 (L) g/dL      ALT (SGPT) 53 U/L      AST (SGOT) 65 (H) U/L      Alkaline Phosphatase 400 (H) U/L      Total Bilirubin 1.1 (H) mg/dL      eGFR Non African Amer 26 (L) mL/min/1.73      Globulin 3.3 gm/dL      A/G Ratio 0.9 (L) g/dL      BUN/Creatinine Ratio 32.1 (H)     Anion Gap 10.0 mmol/L     Narrative:       The MDRD GFR formula is only valid for adults with stable renal function between ages 18 and 70.    Magnesium [[id number]]  (Normal) Collected:  05/01/18 0251    Specimen:  Blood Updated:  05/01/18 0340     Magnesium 2.2 mg/dL     CBC & Differential [[id number]] Collected:  05/01/18 0252    Specimen:  Blood Updated:  05/01/18 0321    Narrative:       The following orders were created for panel order CBC & Differential.  Procedure                               Abnormality         Status                     ---------                               -----------         ------                     CBC Auto Differential[[id number]]        Abnormal            Final result                 Please view results for these tests on the individual orders.    CBC Auto Differential [[id number]]  (Abnormal) Collected:  05/01/18 0252    Specimen:  Blood Updated:  05/01/18 0321     WBC 8.08 10*3/mm3      RBC 4.21 10*6/mm3      Hemoglobin 11.8 (L) g/dL      Hematocrit 34.6 (L) %      MCV 82.2 fL      MCH 28.0 pg      MCHC 34.1 g/dL      RDW 17.2 (H) %      RDW-SD 50.4 fl      MPV 9.6 fL      Platelets 257  10*3/mm3      Neutrophil % 92.4 (H) %      Lymphocyte % 4.0 (L) %      Monocyte % 2.0 (L) %      Eosinophil % 0.1 %      Basophil % 0.1 %      Immature Grans % 1.4 %      Neutrophils, Absolute 7.47 10*3/mm3      Lymphocytes, Absolute 0.32 (L) 10*3/mm3      Monocytes, Absolute 0.16 (L) 10*3/mm3      Eosinophils, Absolute 0.01 10*3/mm3      Basophils, Absolute 0.01 10*3/mm3      Immature Grans, Absolute 0.11 (H) 10*3/mm3      nRBC 0.2 (H) /100 WBC     Comprehensive Metabolic Panel [[id number]]  (Abnormal) Collected:  04/30/18 0456    Specimen:  Blood Updated:  04/30/18 0530     Glucose 91 mg/dL      BUN 70 (H) mg/dL      Creatinine 2.11 (H) mg/dL      Sodium 135 mmol/L      Potassium 3.4 (L) mmol/L      Chloride 93 (L) mmol/L      CO2 29.0 mmol/L      Calcium 8.5 mg/dL      Total Protein 6.2 (L) g/dL      Albumin 3.00 (L) g/dL      ALT (SGPT) 59 (H) U/L      AST (SGOT) 66 (H) U/L      Alkaline Phosphatase 411 (H) U/L      Total Bilirubin 1.1 (H) mg/dL      eGFR Non African Amer 23 (L) mL/min/1.73      Globulin 3.2 gm/dL      A/G Ratio 0.9 (L) g/dL      BUN/Creatinine Ratio 33.2 (H)     Anion Gap 13.0 mmol/L     Narrative:       The MDRD GFR formula is only valid for adults with stable renal function between ages 18 and 70.    CBC & Differential [[id number]] Collected:  04/30/18 0456    Specimen:  Blood Updated:  04/30/18 0519    Narrative:       The following orders were created for panel order CBC & Differential.  Procedure                               Abnormality         Status                     ---------                               -----------         ------                     CBC Auto Differential[[id number]]        Abnormal            Final result                 Please view results for these tests on the individual orders.    CBC Auto Differential [[id number]]  (Abnormal) Collected:  04/30/18 0456    Specimen:  Blood Updated:  04/30/18 0519     WBC 9.92 10*3/mm3      RBC 4.52 10*6/mm3      Hemoglobin 12.7 g/dL       Hematocrit 36.3 (L) %      MCV 80.3 (L) fL      MCH 28.1 pg      MCHC 35.0 g/dL      RDW 16.9 (H) %      RDW-SD 48.3 fl      MPV 10.2 fL      Platelets 251 10*3/mm3      Neutrophil % 84.8 (H) %      Lymphocyte % 5.0 (L) %      Monocyte % 7.2 %      Eosinophil % 0.8 %      Basophil % 0.5 %      Immature Grans % 1.7 %      Neutrophils, Absolute 8.41 (H) 10*3/mm3      Lymphocytes, Absolute 0.50 (L) 10*3/mm3      Monocytes, Absolute 0.71 10*3/mm3      Eosinophils, Absolute 0.08 10*3/mm3      Basophils, Absolute 0.05 10*3/mm3      Immature Grans, Absolute 0.17 (H) 10*3/mm3      nRBC 0.3 (H) /100 WBC            Medication Review: yes  Current Facility-Administered Medications   Medication Dose Route Frequency Provider Last Rate Last Dose   • albuterol (PROVENTIL) nebulizer solution 0.083% 2.5 mg/3mL  2.5 mg Nebulization Q6H PRN Joanna Munoz MD       • allopurinol (ZYLOPRIM) tablet 100 mg  100 mg Oral Daily Beto Wayne MD   100 mg at 05/02/18 0903   • amiodarone (PACERONE) tablet 200 mg  200 mg Oral Q12H Simeon Ca MD   200 mg at 05/02/18 0903   • atorvastatin (LIPITOR) tablet 10 mg  10 mg Oral Nightly Beto Wayne MD   10 mg at 05/01/18 2113   • famotidine (PEPCID) injection 20 mg  20 mg Intravenous BID Micha Loving MD   20 mg at 05/02/18 0903   • hydrALAZINE (APRESOLINE) tablet 25 mg  25 mg Oral Q8H Dayton Baker MD   25 mg at 05/02/18 1130   • isosorbide dinitrate (ISORDIL) tablet 20 mg  20 mg Oral Q8H Dayton Baker MD   20 mg at 05/02/18 0903   • Magnesium Sulfate 2 gram Bolus, followed by 8 gram infusion (total Mg dose 10 grams)- Mg less than or equal to 1mg/dL  2 g Intravenous PRN Dayton Baker MD        Or   • Magnesium Sulfate 6 gram Infusion (2 gm x 3) -Mg 1.1 -1.5 mg/dL  2 g Intravenous PRN Dayton Baker MD        Or   • magnesium sulfate 4 gram infusion- Mg 1.6-1.9 mg/dL  4 g Intravenous PRN Dayton Baker MD       • naloxone (NARCAN) injection 0.4 mg  0.4 mg Intravenous  Q5 Min PRN Joanna Munoz MD       • naloxone (NARCAN) injection 0.4 mg  0.4 mg Intravenous Q5 Min PRN Joanna Munoz MD       • ondansetron (ZOFRAN) injection 4 mg  4 mg Intravenous Q4H PRN Micha Loving MD   4 mg at 05/01/18 1219   • oxyCODONE (ROXICODONE) immediate release tablet 5 mg  5 mg Oral Q6H PRN Beto Wayne MD   5 mg at 04/30/18 2021   • piperacillin-tazobactam (ZOSYN) 3.375 g in iso-osmotic dextrose 50 ml (premix)  3.375 g Intravenous Q8H Joanna Munoz MD 0 mL/hr at 05/01/18 2100 3.375 g at 05/02/18 0903   • potassium chloride (MICRO-K) CR capsule 40 mEq  40 mEq Oral PRN Dayton Baker MD   40 mEq at 04/26/18 0849    Or   • potassium chloride (KLOR-CON) packet 40 mEq  40 mEq Oral PRN Dayton Baker MD        Or   • potassium chloride 10 mEq in 100 mL IVPB  10 mEq Intravenous Q1H PRN Dayton Baker MD   Stopped at 04/30/18 1750   • sodium chloride 0.9 % flush 1-10 mL  1-10 mL Intravenous PRN Joanna Munoz MD   10 mL at 05/01/18 0551         Assessment/Plan     Principal Problem:    Acute cholecystitis  Active Problems:    Dilated cardiomyopathy    Essential hypertension    Mixed hyperlipidemia    NSVT (nonsustained ventricular tachycardia)    Acute kidney injury    Acute on chronic systolic congestive heart failure    Gastroesophageal reflux disease without esophagitis      Plan    1. Recheck potassium level per protocol.   2. Magnesium level.  3. Continue to keep potassium > 4.0 and magnesium > 2.0. Replace per protocol.   4. BP improved- pt tolerated increased doses of hydralazine and isordil. Will continue dose increase of isordil to 30 mg by mouth three times daily with goal of 40 mg by mouth three times daily over the next 24 hours. If BP tolerates this increase, will increase hydralazine to 50 mg by mouth three times daily later today with goal of 75 mg by mouth three times daily over the next 24 hours.     Celina Concepcion, OSCAR  05/02/18  11:44 AM

## 2018-05-02 NOTE — PLAN OF CARE
Problem: Patient Care Overview  Goal: Plan of Care Review  Outcome: Ongoing (interventions implemented as appropriate)   05/02/18 0128   Coping/Psychosocial   Plan of Care Reviewed With patient   Plan of Care Review   Progress improving   OTHER   Outcome Summary VSS. Pt has had no c/o pain or soa. IV abx continue. Will continue to monitor and notify MD of changes.     Goal: Individualization and Mutuality  Outcome: Ongoing (interventions implemented as appropriate)      Problem: Fall Risk (Adult)  Goal: Absence of Fall  Outcome: Ongoing (interventions implemented as appropriate)      Problem: Pain, Acute (Adult)  Goal: Acceptable Pain Control/Comfort Level  Outcome: Ongoing (interventions implemented as appropriate)      Problem: Skin Injury Risk (Adult)  Goal: Skin Health and Integrity  Outcome: Ongoing (interventions implemented as appropriate)      Problem: Cardiac Output Decreased (Adult)  Goal: Effective Tissue Perfusion  Outcome: Ongoing (interventions implemented as appropriate)

## 2018-05-02 NOTE — PLAN OF CARE
Problem: Patient Care Overview  Goal: Plan of Care Review  Outcome: Ongoing (interventions implemented as appropriate)   05/02/18 1314   Coping/Psychosocial   Plan of Care Reviewed With patient;daughter   OTHER   Outcome Summary PT eval complete. pt was A&Ox4 and agreeable to therapy this afternoon. pt was supervision for bed mobility, CGA for sit<>stand and ambulating 20ft in room with RW. pt B LE/UE AROM and MMT were WFL. pt reported her biggest problem with walking was pain in L foot d/t gout. pt would benefit from skilled PT at this time to address impaired mobility. PT recommends home with home health care services or home with family caregivers at d/c

## 2018-05-02 NOTE — THERAPY EVALUATION
Acute Care - Physical Therapy Initial Evaluation  [facility]     Patient Name: Evelin Leach  : 1945  MRN: [id number]  Today's Date: 2018   Onset of Illness/Injury or Date of Surgery: 18  Date of Referral to PT: 18  Referring Physician: Dr. Loving      Admit Date: 2018    Visit Dx:     ICD-10-CM ICD-9-CM   1. Acute on chronic systolic congestive heart failure I50.23 428.23     428.0   2. Impaired functional mobility and activity tolerance Z74.09 V49.89     Patient Active Problem List   Diagnosis   • Acute cholecystitis   • Essential hypertension   • Mixed hyperlipidemia   • Dilated cardiomyopathy   • NSVT (nonsustained ventricular tachycardia)   • Acute kidney injury   • Acute on chronic systolic congestive heart failure   • Gastroesophageal reflux disease without esophagitis     Past Medical History:   Diagnosis Date   • Arthritis    • CHF (congestive heart failure)    • GERD (gastroesophageal reflux disease)    • Hypertension    • TIA (transient ischemic attack)      Past Surgical History:   Procedure Laterality Date   • CARDIAC CATHETERIZATION     • CARDIAC CATHETERIZATION N/A 2018    Procedure: Right Heart Cath, possible leave-in SG. Case at 130 please;  Surgeon: Dayton Baker MD;  Location: [facility] INVASIVE LOCATION;  Service: Cardiovascular   • CARDIAC PACEMAKER PLACEMENT          PT ASSESSMENT (last 12 hours)      Physical Therapy Evaluation     Row Name 18 1314          PT Evaluation Time/Intention    Subjective Information complains of;pain  -DEWAYNE (r) CS (t) DEWAYNE (c)     Document Type evaluation  -DEWAYNE (r) CS (t) DEWAYNE (c)     Mode of Treatment physical therapy  -DEWAYNE (r) CS (t) DEWAYNE (c)     Patient Effort good  -DEWAYNE (r) CS (t) DEWAYNE (c)     Row Name 18 1314          General Information    Patient Profile Reviewed? yes  -DEWAYNE (r) CS (t) DEWAYNE (c)     Onset of Illness/Injury or Date of Surgery 18  -DEWAYNE (r) CS (t) DEWAYNE (c)     Referring Physician Dr. Loving  -DEWAYNE (r) CS (t) DEWAYNE  (c)     Patient Observations alert;cooperative;agree to therapy  -DEWAYNE (r) CS (t) DEWAYNE (c)     Patient/Family Observations daughter in chair  -DEWAYNE (r) CS (t) DEWAYNE (c)     General Observations of Patient fowlers, alert  -DEWAYNE (r) CS (t) DEWAYNE (c)     Prior Level of Function independent:;all household mobility;gait;ADL's;grooming;dressing;bathing;home management  -DEWAYNE (r) CS (t) DEWAYNE (c)     Equipment Currently Used at Home cane, straight;walker, rolling  -DEWAYNE (r) CS (t) DEWAYNE (c)     Pertinent History of Current Functional Problem 4/23 pt presented to [facility] with n/v and abdominal pain. pt was transerred to [facility]. DX: acute cholecystitis 4/26 pt had swan-ramsey catheter placed  -DEWAYNE (r) CS (t) DEWAYNE (c)     Existing Precautions/Restrictions fall  -DEWAYNE (r) CS (t) DEWAYNE (c)     Risks Reviewed patient and family:;LOB;nausea/vomiting;dizziness;increased discomfort;change in vital signs  -DEWAYNE (r) CS (t) DEWAYNE (c)     Benefits Reviewed patient and family:;improve function;increase independence;increase strength;increase balance;decrease pain  -DEWAYNE (r) CS (t) DEWAYNE (c)     Barriers to Rehab medically complex  -DEWAYNE (r) CS (t) DEWAYNE (c)     Row Name 05/02/18 1314          Relationship/Environment    Lives With alone  -DEWAYNE (r) CS (t) DEWAYNE (c)     Family Caregiver if Needed child(halina), adult  -DEWAYNE (r) CS (t) DEWAYNE (c)     Row Name 05/02/18 1314          Resource/Environmental Concerns    Current Living Arrangements home/apartment/condo  -DEWAYNE (r) CS (t) DEWAYNE (c)     Row Name 05/02/18 1314          Home Main Entrance    Number of Stairs, Main Entrance three  -DEWAYNE (r) CS (t) DEWAYNE (c)     Stair Railings, Main Entrance railings on both sides of stairs  -DEWAYNE (r) CS (t) DEWAYNE (c)     Row Name 05/02/18 1314          Cognitive Assessment/Interventions    Additional Documentation Cognitive Assessment/Intervention (Group)  -DEWAYNE (r) CS (t) DEWAYNE (c)     Row Name 05/02/18 1314          Cognitive Assessment/Intervention- PT/OT    Affect/Mental Status (Cognitive) WNL  -DEWAYNE (r) CS (t) DEWAYNE (c)      Orientation Status (Cognition) oriented x 4  -DEWAYNE (r) CS (t) DEWAYNE (c)     Follows Commands (Cognition) WNL;follows one step commands;over 90% accuracy  -DEWAYNE (r) CS (t) DEWAYNE (c)     Cognitive Function (Cognitive) WNL  -DEWAYNE (r) CS (t) DEWAYNE (c)     Personal Safety Interventions fall prevention program maintained;gait belt;muscle strengthening facilitated;nonskid shoes/slippers when out of bed  -DEWAYNE (r) CS (t) DEWAYNE (c)     Row Name 05/02/18 1314          Safety Issues, Functional Mobility    Impairments Affecting Function (Mobility) endurance/activity tolerance;pain;strength  -DEWAYNE (r) CS (t) DEWAYNE (c)     Row Name 05/02/18 1314          Bed Mobility Assessment/Treatment    Bed Mobility Assessment/Treatment rolling right;supine-sit-supine  -DEWAYNE (r) CS (t) DEWAYNE (c)     Rolling Right [place] (Bed Mobility) supervision  -DEWAYNE (r) CS (t) DEWAYNE (c)     Supine-Sit-Supine [place] (Bed Mobility) supervision  -DEWAYNE (r) CS (t) DEWAYNE (c)     Assistive Device (Bed Mobility) bed rails;head of bed elevated  -DEWAYNE (r) CS (t) DEWAYNE (c)     Row Name 05/02/18 1314          Transfer Assessment/Treatment    Transfer Assessment/Treatment sit-stand transfer;stand-sit transfer  -DEWAYNE (r) CS (t) DEWAYNE (c)     Sit-Stand [place] (Transfers) contact guard;verbal cues;1 person assist  -DEWAYNE (r) CS (t) DEWAYNE (c)     Stand-Sit [place] (Transfers) contact guard;verbal cues;1 person assist  -DEWAYNE (r) CS (t) DEWAYNE (c)     Row Name 05/02/18 1314          Sit-Stand Transfer    Assistive Device (Sit-Stand Transfers) walker, front-wheeled  -DEWAYNE (r) CS (t) DEWAYNE (c)     Row Name 05/02/18 1314          Stand-Sit Transfer    Assistive Device (Stand-Sit Transfers) walker, front-wheeled  -DEWAYNE (r) CS (t) DEWAYNE (c)     Row Name 05/02/18 1314          Gait/Stairs Assessment/Training    Gait/Stairs Assessment/Training gait/ambulation assistive device  -DEWAYNE (r) CS (t) DEWAYNE (c)     [place] Level (Gait) contact guard;verbal cues;1 person assist  -DEWAYNE (r) CS (t) DEWAYNE (c)     Assistive Device (Gait) walker,  front-wheeled  -DEWAYNE (r) CS (t) DEWAYNE (c)     Distance in Feet (Gait) 20ft  -DEWAYNE (r) CS (t) DEWAYNE (c)     Deviations/Abnormal Patterns (Gait) bill decreased;gait speed decreased;stride length decreased  -DEWAYNE (r) CS (t) DEWAYNE (c)     Row Name 05/02/18 1314          General ROM    GENERAL ROM COMMENTS B UE/LE AROM WFL  -DEWAYNE (r) CS (t) DEWAYNE (c)     Row Name 05/02/18 1314          General Assessment (Manual Muscle Testing)    Comment, General Manual Muscle Testing (MMT) Assessment B UE/LE MMT WFL  -DEWAYNE (r) CS (t) DEWAYNE (c)     Row Name 05/02/18 1314          Motor Assessment/Intervention    Additional Documentation Balance (Group)  -DEWAYNE (r) CS (t) DEWAYNE (c)     Row Name 05/02/18 1314          Balance    Balance static sitting balance;dynamic standing balance  -DEWAYNE (r) CS (t) DEWAYNE (c)     Row Name 05/02/18 1314          Static Sitting Balance    Level of [place] (Unsupported Sitting, Static Balance) supervision  -DEWAYNE (r) CS (t) DEWAYNE (c)     Sitting Position (Unsupported Sitting, Static Balance) sitting on edge of bed  -DEWAYNE (r) CS (t) DEWAYNE (c)     Row Name 05/02/18 1314          Dynamic Standing Balance    Level of [place], Reaches Outside Midline (Standing, Dynamic Balance) contact guard assist  -DEWAYNE (r) CS (t) DEWAYNE (c)     Row Name 05/02/18 1314          Vision Assessment/Intervention    Visual Impairment/Limitations corrective lenses for reading  -DEWAYNE (r) CS (t) DEWAYNE (c)     Row Name 05/02/18 1314          Pain Assessment    Additional Documentation Pain Scale: FACES Pre/Post-Treatment (Group)  -DEWAYNE (r) CS (t) DEWAYNE (c)     Row Name 05/02/18 1314          Pain Scale: FACES Pre/Post-Treatment    Pain: FACES Scale, Pretreatment 4-->hurts little more  -DEWAYNE (r) CS (t) DEWAYNE (c)     Pain: FACES Scale, Post-Treatment 4-->hurts little more  -DEWAYNE (r) CS (t) DEWAYNE (c)     Row Name 05/02/18 1314          Health Promotion    Additional Documentation Coping (Group);Plan of Care Review (Group)  -DEWAYNE (r) CS (t) DEWAYNE (c)     [facility] Name 05/02/18 1314          Coping     Observed Emotional State accepting;calm;cooperative  -DEWAYNE (r) CS (t) DEWAYNE (c)     Verbalized Emotional State acceptance  -DEWAYNE (r) CS (t) DEWAYNE (c)     Row Name 05/02/18 1314          Plan of Care Review    Plan of Care Reviewed With patient;daughter  -DEWAYNE (r) CS (t) DEWAYNE (c)     Row Name 05/02/18 1314          Physical Therapy Clinical Impression    Date of Referral to PT 04/29/18  -DEWAYNE (r) CS (t) DEWAYNE (c)     PT Diagnosis (PT Clinical Impression) impaired mobility   -DEWAYNE (r) CS (t) DEWAYNE (c)     Patient/Family Goals Statement (PT Clinical Impression) Return home  -DEWAYNE (r) CS (t) DEWAYNE (c)     Criteria for Skilled Interventions Met (PT Clinical Impression) yes;treatment indicated  -DEWAYNE (r) CS (t) DEWAYNE (c)     Pathology/Pathophysiology Noted (Describe Specifically for Each System) musculoskeletal  -DEWAYNE (r) CS (t) DEWAYNE (c)     Impairments Found (describe specific impairments) aerobic capacity/endurance;gait, locomotion, and balance  -DEWAYNE (r) CS (t) DEWAYNE (c)     Functional Limitations in Following Categories (Describe Specific Limitations) self-care;home management  -DEWAYNE (r) CS (t) DEWAYNE (c)     Rehab Potential (PT Clinical Summary) fair, will monitor progress closely  -DEWAYNE (r) CS (t) DEWYANE (c)     Predicted Duration of Therapy (PT) until d/c  -DEWAYNE (r) CS (t) DEWAYNE (c)     Care Plan Review (PT) evaluation/treatment results reviewed;care plan/treatment goals reviewed;patient/other agree to care plan  -DEWAYNE (r) CS (t) DEWAYNE (c)     Care Plan Review, Other Participant (PT Clinical Impression) daughter  -DEWAYNE (r) CS (t) DEWAYNE (c)     Row Name 05/02/18 1314          Physical Therapy Goals    Bed Mobility Goal Selection (PT) bed mobility, PT goal 1  -DEWAYNE (r) CS (t) DEWAYNE (c)     Transfer Goal Selection (PT) transfer, PT goal 1  -DEWAYNE (r) CS (t) DEWAYNE (c)     Gait Training Goal Selection (PT) gait training, PT goal 1  -DEWAYNE (r) CS (t) DEWAYNE (c)     Stairs Goal Selection (PT) stairs, PT goal 1  -DEWAYNE (r) CS (t) DEWAYNE (c)     Additional Documentation Stairs Goal Selection (PT) (Row)  -DEWAYNE (r) CS  (t) DEWAYNE (c)     Row Name 05/02/18 1314          Bed Mobility Goal 1 (PT)    Activity/Assistive Device (Bed Mobility Goal 1, PT) rolling to left;rolling to right;sit to supine/supine to sit  -DEWAYNE (r) CS (t) DEWAYNE (c)     [place] Level/Cues Needed (Bed Mobility Goal 1, PT) independent  -DEWAYNE (r) CS (t) DEWAYNE (c)     Time Frame (Bed Mobility Goal 1, PT) long term goal (LTG);10 days  -DEWAYNE (r) CS (t) DEWAYNE (c)     Barriers (Bed Mobility Goal 1, PT) none  -DEWAYNE (r) CS (t) DEWAYNE (c)     Progress/Outcomes (Bed Mobility Goal 1, PT) goal ongoing  -DEWAYNE (r) CS (t) DEWAYNE (c)     Row Name 05/02/18 1314          Transfer Goal 1 (PT)    Activity/Assistive Device (Transfer Goal 1, PT) sit-to-stand/stand-to-sit;bed-to-chair/chair-to-bed;walker, rolling  -DEWAYNE (r) CS (t) DEWAYNE (c)     [place] Level/Cues Needed (Transfer Goal 1, PT) independent  -DEWAYNE (r) CS (t) DEWAYNE (c)     Time Frame (Transfer Goal 1, PT) long term goal (LTG);10 days  -DEWAYNE (r) CS (t) DEWAYNE (c)     Barriers (Transfers Goal 1, PT) L foot gout  -DEWAYNE (r) CS (t) DEWAYNE (c)     Progress/Outcome (Transfer Goal 1, PT) goal ongoing  -DEWAYNE (r) CS (t) DEWAYNE (c)     Row Name 05/02/18 1317          Gait Training Goal 1 (PT)    Activity/Assistive Device (Gait Training Goal 1, PT) gait (walking locomotion);assistive device use;walker, rolling;decrease fall risk;improve balance and speed;increase endurance/gait distance  -DEWAYNE (r) CS (t) DEWAYNE (c)     [place] Level (Gait Training Goal 1, PT) independent  -DEWAYNE (r) CS (t) DEWAYNE (c)     Distance (Gait Goal 1, PT) 50ft  -DEWAYNE (r) CS (t) DEWAYNE (c)     Time Frame (Gait Training Goal 1, PT) long term goal (LTG);10 days  -DEWAYNE (r) CS (t) DEWAYNE (c)     Barriers (Gait Training Goal 1, PT) L foot gout  -DEWAYNE (r) CS (t) DEWAYNE (c)     Progress/Outcome (Gait Training Goal 1, PT) goal ongoing  -DEWAYNE (r) RASHMI (t) DEWAYNE (c)     Row Name 05/02/18 1314          Stairs Goal 1 (PT)    Activity/Assistive Device (Stairs Goal 1, PT) ascending stairs;descending stairs;using handrail  -DEWAYNE (r) RASHMI (t) DEWAYNE (c)      [place] Level/Cues Needed (Stairs Goal 1, PT) contact guard assist  -DEWAYNE (r) CS (t) DEWAYNE (c)     Number of Stairs (Stairs Goal 1, PT) 3  -DEWAYNE (r) CS (t) DEWAYNE (c)     Time Frame (Stairs Goal 1, PT) long term goal (LTG);10 days  -DEWAYNE (r) CS (t) DEWAYNE (c)     Barriers (Stairs Goal 1, PT) L foot gout  -DEWAYNE (r) CS (t) DEWAYNE (c)     Progress/Outcome (Stairs Goal 1, PT) goal ongoing  -DEWAYNE (r) CS (t) DEWAYNE (c)     Row Name 05/02/18 1314          Positioning and Restraints    Pre-Treatment Position in bed  -DEWAYNE (r) CS (t) DEWAYNE (c)     Post Treatment Position bed  -DEWAYNE (r) CS (t) DEWAYNE (c)     In Bed sitting EOB;call light within reach;encouraged to call for assist;with family/caregiver  -DEWAYNE (r) CS (t) DEWAYNE (c)     Row Name 05/02/18 1314          Living Environment    Home Accessibility stairs to enter home  -DEWAYNE (r) CS (t) DEWAYNE (c)       User Key  (r) = Recorded By, (t) = Taken By, (c) = Cosigned By    Initials Name Provider Type    DEWAYNE Gilbert, PT DPT Physical Therapist     Rodrigo Gasca, PT Student PT Student          Physical Therapy Education     Title: PT OT SLP Therapies (Active)     Topic: Physical Therapy (Active)     Point: Mobility training (Active)    Learning Progress Summary     Learner Status Readiness Method Response Comment Documented by    Patient Active Acceptance E NR pt educated on progression of PT POC  05/02/18 1408    Family Active Acceptance E NR pt educated on progression of PT POC  05/02/18 1408                      User Key     Initials Effective Dates Name Provider Type Discipline     01/08/18 -  Rodrigo Gasca, PT Student PT Student PT                PT Recommendation and Plan  Planned Therapy Interventions (PT Eval): balance training, bed mobility training, gait training, home exercise program, patient/family education, stair training, strengthening, transfer training  Therapy Frequency (PT Clinical Impression): daily  Plan of Care Reviewed With: patient, daughter  Outcome Summary: PT eval  complete. pt was A&Ox4 and agreeable to therapy this afternoon. pt was supervision for bed mobility, CGA for sit<>stand and ambulating 20ft in room with RW. pt B LE/UE AROM and MMT were WFL. pt reported her biggest problem with walking was pain in L foot d/t gout. pt would benefit from skilled PT at this time to address impaired mobility. PT recommends home with home health care services or home with family caregivers at d/c          Outcome Measures     Row Name 05/02/18 1314             How much help from another person do you currently need...    Turning from your back to your side while in flat bed without using bedrails? 3  -DEWAYNE (r) CS (t) DEWAYNE (c)      Moving from lying on back to sitting on the side of a flat bed without bedrails? 3  -DEWAYNE (r) CS (t) DEWAYNE (c)      Moving to and from a bed to a chair (including a wheelchair)? 3  -DEWAYNE (r) CS (t) DEWAYNE (c)      Standing up from a chair using your arms (e.g., wheelchair, bedside chair)? 4  -DEWAYNE (r) CS (t) DEWAYNE (c)      Climbing 3-5 steps with a railing? 2  -DEWAYNE (r) CS (t) DEWAYNE (c)      To walk in hospital room? 3  -DEWAYNE (r) CS (t) DEWAYNE (c)      AM-PAC 6 Clicks Score 18  -DEWAYNE (r) CS (t)         Functional Assessment    Outcome Measure Options AM-PAC 6 Clicks Basic Mobility (PT)  -DEWAYNE (r) CS (t) DEWAYNE (c)        User Key  (r) = Recorded By, (t) = Taken By, (c) = Cosigned By    Initials Name Provider Type    DEWAYNE Gilbert, PT DPT Physical Therapist    RASHMI Gasca, PT Student PT Student           Time Calculation:         PT Charges     Row Name 05/02/18 1411             Time Calculation    Start Time 1314  -DEWAYNE (r) CS (t) DEWAYNE (c)      Stop Time 1346   Chart review & previous evaluation attempt time: 25 minutes; total time 57 minutes  -DEWAYNE (r) CS (t) DEWAYNE (c)      Time Calculation (min) 32 min  -DEWAYNE (r) CS (t)      PT Received On 05/02/18  -DEWAYNE (r) CS (t) DEWAYNE (c)      PT Goal Re-Cert Due Date 05/12/18  -DEWAYNE (r) CS (t) DEWAYNE (c)        User Key  (r) = Recorded By, (t) = Taken By, (c) =  Cosigned By    Initials Name Provider Type    DEWAYNE Gilbert, PT DPT Physical Therapist    CS Rodrigo Gasca, PT Student PT Student          Therapy Charges for Today     Code Description Service Date Service Provider Modifiers Qty    [id number] HC PT EVAL LOW COMPLEXITY 4 5/2/2018 Rodrigo Gasca, PT Student GP 1          PT G-Codes  Outcome Measure Options: AM-PAC 6 Clicks Basic Mobility (PT)  Score: 18  Functional Limitation: Mobility: Walking and moving around  Mobility: Walking and Moving Around Current Status (): At least 40 percent but less than 60 percent impaired, limited or restricted  Mobility: Walking and Moving Around Goal Status (): At least 20 percent but less than 40 percent impaired, limited or restricted      Rodrigo Gasca, PT Student  5/2/2018

## 2018-05-02 NOTE — PLAN OF CARE
Problem: Patient Care Overview  Goal: Plan of Care Review  Outcome: Ongoing (interventions implemented as appropriate)   05/02/18 1446   Coping/Psychosocial   Plan of Care Reviewed With patient;daughter   Plan of Care Review   Progress improving   OTHER   Outcome Summary VSS. NO C/O PAIN THIS SHIFT. NSR 79-94 WITH PVC, 6 BEATS VTACH. HYDRALAZINE AND ISORDIL DOSES TITRATED UP TODAY, BP TOLERATING. O2 D/C, SATS WNL ON RA. PT EVAL COMPLETED. PATIENT SHOWING IMPROVEMENT WITH AMBULATING. POTASSIUM REPLACED PER PROTOCOL. CONTINUE TO MONITOR, NOTIFY MD OF ANY CHANGES.       Problem: Fall Risk (Adult)  Goal: Absence of Fall  Outcome: Ongoing (interventions implemented as appropriate)   05/02/18 1446   Fall Risk (Adult)   Absence of Fall achieves outcome       Problem: Pain, Acute (Adult)  Goal: Acceptable Pain Control/Comfort Level  Outcome: Ongoing (interventions implemented as appropriate)   05/02/18 1446   Pain, Acute (Adult)   Acceptable Pain Control/Comfort Level making progress toward outcome       Problem: Skin Injury Risk (Adult)  Goal: Skin Health and Integrity  Outcome: Ongoing (interventions implemented as appropriate)   05/02/18 1446   Skin Injury Risk (Adult)   Skin Health and Integrity making progress toward outcome       Problem: Cardiac Output Decreased (Adult)  Goal: Effective Tissue Perfusion  Outcome: Ongoing (interventions implemented as appropriate)   05/02/18 1446   Cardiac Output Decreased (Adult)   Effective Tissue Perfusion making progress toward outcome

## 2018-05-02 NOTE — PROGRESS NOTES
Nephrology ([facility]) Progress Note      Patient:  Evelin Leach  YOB: 1945  Date of Service: 5/2/2018  MRN: [id number]   Acct: [id number]   Primary Care Physician: Michael Rojas MD  Advance Directive: Conditional Code  Admit Date: 4/23/2018       Hospital Day: 9  Referring Provider: Joanna Munoz MD      Patient personally seen and examined.  Complete chart including Consults, Notes, Operative Reports, Labs, Cardiology, and Radiology studies reviewed as able.        Subjective:  Evelin Leach is a 72 y.o. female  whom we were consulted for acute kidney injury. No prior known renal history.  History of hypertension and chronic systolic congestive heart failure with ejection fraction <20%.  Presented to [place] ER with nausea, vomiting and abdominal pain; was transferred to [facility] for cholecystitis.  Hospital course remarkable for increased dsypnea and edema following administration of IV fluids.  poor response to diuretics.  Became more hypotensive and right heart catheterization done on 4/26 with [place] catheter placement.  Milrinone drip started but discontinued secondary to arrhythmias.  Blood pressure has since improved and PA catheter removed. Transferred to medical floor on 4/30.    Today is feeling better; no nausea/vomiting, pain in toe improving.  Urine output nonoliguric.    Allergies:  Advair diskus [fluticasone-salmeterol]; Aspirin; Ciprofloxacin hcl; Dexamethasone; Ergocalciferol; Hydrochlorothiazide; Spironolactone; Sulfa antibiotics; and Vitamin d analogs    Home Meds:  Prescriptions Prior to Admission   Medication Sig Dispense Refill Last Dose   • albuterol (PROVENTIL HFA;VENTOLIN HFA) 108 (90 Base) MCG/ACT inhaler Inhale 2 puffs Every 4 (Four) Hours As Needed for Wheezing or Shortness of Air.      • allopurinol (ZYLOPRIM) 100 MG tablet Take 100 mg by mouth Daily.      • clopidogrel (PLAVIX) 75 MG tablet Take 75 mg by mouth Daily.      •  furosemide (LASIX) 20 MG tablet Take 20 mg by mouth 2 (Two) Times a Day.      • furosemide (LASIX) 20 MG tablet Take 10 mg by mouth Take As Directed. Monday, Wednesday and Friday      • metOLazone (ZAROXOLYN) 2.5 MG tablet Take 2.5 mg by mouth Daily As Needed (PRN edema).      • Omega-3 Fatty Acids (FISH OIL) 1000 MG capsule capsule Take 1,000 mg by mouth Daily With Breakfast.      • potassium chloride (K-DUR) 10 MEQ CR tablet Take 20 mEq by mouth Daily.      • pravastatin (PRAVACHOL) 20 MG tablet Take 20 mg by mouth Every Night.      • predniSONE (DELTASONE) 2.5 MG tablet Take 2.5 mg by mouth Every Other Day.      • Probiotic Product (PROBIOTIC-10 PO) Take 1 tablet by mouth Daily.      • sotalol (BETAPACE) 80 MG tablet Take 40 mg by mouth 2 (Two) Times a Day.          Medicines:  Current Facility-Administered Medications   Medication Dose Route Frequency Provider Last Rate Last Dose   • albuterol (PROVENTIL) nebulizer solution 0.083% 2.5 mg/3mL  2.5 mg Nebulization Q6H PRN Joanna Munoz MD       • allopurinol (ZYLOPRIM) tablet 100 mg  100 mg Oral Daily Beto Wayne MD   100 mg at 05/02/18 0903   • amiodarone (PACERONE) tablet 200 mg  200 mg Oral Q12H Simeon Ca MD   200 mg at 05/02/18 0903   • atorvastatin (LIPITOR) tablet 10 mg  10 mg Oral Nightly Beto Wayne MD   10 mg at 05/01/18 2113   • famotidine (PEPCID) injection 20 mg  20 mg Intravenous BID Micha Loving MD   20 mg at 05/02/18 0903   • hydrALAZINE (APRESOLINE) tablet 25 mg  25 mg Oral Q8H Dayton Baker MD       • isosorbide dinitrate (ISORDIL) tablet 20 mg  20 mg Oral Q8H Dayton Baker MD   20 mg at 05/02/18 0903   • Magnesium Sulfate 2 gram Bolus, followed by 8 gram infusion (total Mg dose 10 grams)- Mg less than or equal to 1mg/dL  2 g Intravenous PRN Dayton Baker MD        Or   • Magnesium Sulfate 6 gram Infusion (2 gm x 3) -Mg 1.1 -1.5 mg/dL  2 g Intravenous PRN Dayton Baker MD        Or   • magnesium sulfate 4 gram  infusion- Mg 1.6-1.9 mg/dL  4 g Intravenous PRN Dayton Baker MD       • naloxone (NARCAN) injection 0.4 mg  0.4 mg Intravenous Q5 Min PRN Joanna Munoz MD       • naloxone (NARCAN) injection 0.4 mg  0.4 mg Intravenous Q5 Min PRN Joanna Munoz MD       • ondansetron (ZOFRAN) injection 4 mg  4 mg Intravenous Q4H PRN Micha Loving MD   4 mg at 05/01/18 1219   • oxyCODONE (ROXICODONE) immediate release tablet 5 mg  5 mg Oral Q6H PRN Beto Wayne MD   5 mg at 04/30/18 2021   • piperacillin-tazobactam (ZOSYN) 3.375 g in iso-osmotic dextrose 50 ml (premix)  3.375 g Intravenous Q8H Joanna Munoz MD 0 mL/hr at 05/01/18 2100 3.375 g at 05/02/18 0903   • potassium chloride (MICRO-K) CR capsule 40 mEq  40 mEq Oral PRN Dayton Baker MD   40 mEq at 04/26/18 0849    Or   • potassium chloride (KLOR-CON) packet 40 mEq  40 mEq Oral PRN Dayton Baker MD        Or   • potassium chloride 10 mEq in 100 mL IVPB  10 mEq Intravenous Q1H PRN Dayton Baker MD   Stopped at 04/30/18 1750   • potassium chloride (MICRO-K) CR capsule 40 mEq  40 mEq Oral Once Micha Loving MD       • sodium chloride 0.9 % flush 1-10 mL  1-10 mL Intravenous PRN Joanna Munoz MD   10 mL at 05/01/18 0551       Past Medical History:  Past Medical History:   Diagnosis Date   • Arthritis    • CHF (congestive heart failure)    • GERD (gastroesophageal reflux disease)    • Hypertension    • TIA (transient ischemic attack)        Past Surgical History:  Past Surgical History:   Procedure Laterality Date   • CARDIAC CATHETERIZATION     • CARDIAC CATHETERIZATION N/A 4/26/2018    Procedure: Right Heart Cath, possible leave-in SG. Case at 130 please;  Surgeon: Dayton Baker MD;  Location:  [facility];  Service: Cardiovascular   • CARDIAC PACEMAKER PLACEMENT         Family History  History reviewed. No pertinent family history.    Social History  Social History     Social History   • Marital status: Legally      Spouse  "name: N/A   • Number of children: N/A   • Years of education: N/A     Occupational History   • Not on file.     Social History Main Topics   • Smoking status: Former Smoker   • Smokeless tobacco: Never Used   • Alcohol use Not on file   • Drug use: Unknown   • Sexual activity: Defer     Other Topics Concern   • Not on file     Social History Narrative   • No narrative on file         Review of Systems:  History obtained from chart review and the patient  General ROS: No fever or chills  Respiratory ROS: No cough or shortness of breath  Cardiovascular ROS: positive for - dyspnea on exertion and edema  Gastrointestinal ROS: No nausea, no abdominal pain  Genito-Urinary ROS: No dysuria or hematuria  Neurological ROS; no headache or dizziness    Objective:  /64 (BP Location: Right arm, Patient Position: Lying)   Pulse 84   Temp 97.9 °F (36.6 °C) (Temporal Artery )   Resp 18   Ht 162.6 cm (64\")   Wt 86 kg (189 lb 9 oz)   SpO2 100%   BMI 32.54 kg/m²     Intake/Output Summary (Last 24 hours) at 05/02/18 0949  Last data filed at 05/02/18 0903   Gross per 24 hour   Intake              110 ml   Output              450 ml   Net             -340 ml     General: awake/alert    Neck: supple, no JVD  Chest:  clear to auscultation bilaterally without respiratory distress  CVS: regular rate and rhythm  Abdominal: soft, nontender, normal bowel sounds  Extremities: trace lower ext edema  Skin: warm and dry without rash  Neuro: no focal motor deficits    Labs:    Results from last 7 days  Lab Units 05/02/18  0511 05/01/18  0252 04/30/18  0456   WBC 10*3/mm3 12.50* 8.08 9.92   HEMOGLOBIN g/dL 12.1 11.8* 12.7   HEMATOCRIT % 35.6* 34.6* 36.3*   PLATELETS 10*3/mm3 284 257 251           Results from last 7 days  Lab Units 05/02/18  0511 05/01/18  0251 04/30/18  0456   SODIUM mmol/L 139 136 135   POTASSIUM mmol/L 3.6 4.0 3.4*   CHLORIDE mmol/L 94* 92* 93*   CO2 mmol/L 34.0* 34.0* 29.0   BUN mg/dL 61* 62* 70*   CREATININE mg/dL " 1.76* 1.93* 2.11*   CALCIUM mg/dL 8.7 8.4 8.5   BILIRUBIN mg/dL 0.9 1.1* 1.1*   ALK PHOS U/L 323* 400* 411*   ALT (SGPT) U/L 48 53 59*   AST (SGOT) U/L 56* 65* 66*   GLUCOSE mg/dL 118* 141* 91       Radiology:   Imaging Results (last 72 hours)     Procedure Component Value Units Date/Time    US Renal Bilateral [[id number]] Collected:  04/26/18 1357     Updated:  04/26/18 1405    Narrative:       RENAL ULTRASOUND COMPLETE 4/26/2018 12:35 PM CDT     REASON FOR EXAM: acute kidney injury; I50.23-Acute on chronic systolic  (congestive) heart failure       COMPARISON: None       TECHNIQUE: Multiple longitudinal and transverse realtime sonographic  images of the kidneys and urinary bladder are obtained.      FINDINGS:      RIGHT KIDNEY: 5 x 6.4 x 11.7 cm. Normal in size, shape, contour and  position. No solid or cystic masses. The central echo complex is compact  with no evidence for hydronephrosis. No nephrolithiasis or abnormal  perinephric fluid collections . No hydroureter. .     The gallbladder is visualized and echogenic sludge is present in the  gallbladder. Common duct is 0.4 cm.     LEFT KIDNEY: 5. 5.5 x 6 x 10.9 cm. Normal in size, shape, contour and  position. No solid or cystic masses. The central echo complex is compact  with no evidence for hydronephrosis. No nephrolithiasis or abnormal  perinephric fluid collections . No hydroureter.      PELVIS: The bladder is mildly distended with anechoic urine and  demonstrates no significant wall thickening or internal echogenicities.  There is no surrounding ascites.        Impression:       1. Negative renal ultrasound.        2. Sludge is noted in the gallbladder.        This report was finalized on 04/26/2018 14:02 by Dr. Marco Islas MD.    XR Chest 1 View [[id number]] Collected:  04/25/18 1423     Updated:  04/25/18 1426    Narrative:       EXAMINATION:   XR CHEST 1 VW-  4/25/2018 2:23 PM CDT     HISTORY: Short of air     Frontal upright radiograph of the chest  4/25/2018 1:10 PM CDT     COMPARISON: April 24, 2018.     FINDINGS:   The lungs are clear. Cardiac silhouettes markedly enlarged. Pacing  device present soft tissues left chest.      The osseous structures and surrounding soft tissues demonstrate no acute  abnormality.       Impression:       1. Marked cardiomegaly no evidence of pulmonary vascular congestion.        This report was finalized on 04/25/2018 14:23 by Dr. Marco Islas MD.    XR Chest 1 View [[id number]] Collected:  04/24/18 2035     Updated:  04/24/18 2045    Narrative:       Exam:   XR CHEST 1 VW-       Date:  4/24/2018      History:  Female, age  72 years;soa     COMPARISON:  None.     Findings :  There is a left-sided cardiac device in place. Cardiomegaly, moderate to  severe. Low lung volumes. The left lung base is not visualized. The  right lung appears clear.       Impression:       Impression:     Moderate to severe cardiomegaly.  Not visualized left lung base. This may reflect atelectasis and/or  pleural effusion.     This report was finalized on 04/24/2018 20:42 by Dr. Leanna Carballo MD.          Culture:  Urine Culture   Date Value Ref Range Status   04/26/2018 No growth at 24 hours  Preliminary         Assessment   1.  Acute kidney injury from cardiorenal syndrome--improving  2.  Baseline of chronic kidney disease stage 3  3.  Acute on chronic systolic congestive heart failure  4.  Benign hypertension   5.  Hypokalemia--resolved  6.  Hyponatremia--resolved  7.  Metabolic acidosis    Plan:  1.  Monitor labs  2.  Replace potassium PRN      Tejinder Grimm, APRCAMILO  5/2/2018  9:49 AM

## 2018-05-02 NOTE — PROGRESS NOTES
[facility]  INPATIENT PROGRESS NOTE    Length of Stay: 9  Date of Admission: 4/23/2018  Primary Care Physician: Michael Rojas MD    Subjective   Chief Complaint: Shortness of breath/weakness/left foot pain    HPI   Patient still remained weak.  Complaint left foot pain today.  Kidney function continued to improve.  Nausea and vomiting, resolved.    Review of Systems   Constitutional: Positive for activity change, appetite change and fatigue. Negative for chills and fever.   HENT: Negative for hearing loss, nosebleeds, tinnitus and trouble swallowing.   Eyes: Negative for visual disturbance.   Respiratory: Negative for cough, chest tightness, shortness of breath and wheezing.    Cardiovascular: Negative for chest pain, palpitations and leg swelling.   Gastrointestinal: Negative for abdominal distention, abdominal pain, blood in stool, constipation, diarrhea, nausea and vomiting.   Endocrine: Negative for cold intolerance, heat intolerance, polydipsia, polyphagia and polyuria.   Genitourinary: Negative for decreased urine volume, difficulty urinating, dysuria, flank pain, frequency and hematuria.   Musculoskeletal: Positive for arthralgias, gait problem, joint swelling and myalgias.   Skin: Negative for rash.   Allergic/Immunologic: Negative for immunocompromised state.   Neurological: Positive for weakness. Negative for dizziness, syncope, light-headedness and headaches.   Hematological: Negative for adenopathy. Does not bruise/bleed easily.   Psychiatric/Behavioral: Negative for confusion and sleep disturbance. The patient is not nervous/anxious.         All pertinent negatives and positives are as above. All other systems have been reviewed and are negative unless otherwise stated.     Objective    Temp:  [97 °F (36.1 °C)-97.9 °F (36.6 °C)] 97 °F (36.1 °C)  Heart Rate:  [66-84] 80  Resp:  [16-20] 18  BP: (108-122)/(61-70) 108/67    Intake/Output Summary  (Last 24 hours) at 05/02/18 1502  Last data filed at 05/02/18 1300   Gross per 24 hour   Intake              290 ml   Output              600 ml   Net             -310 ml     Physical Exam  Constitutional: She is oriented to person, place, and time. She appears well-developed and well-nourished.   HENT:   Head: Normocephalic and atraumatic.   Eyes: Conjunctivae and EOM are normal. Pupils are equal, round, and reactive to light.   Neck: Neck supple. No JVD present. No thyromegaly present.   Cardiovascular: Normal rate, regular rhythm, normal heart sounds and intact distal pulses.  Exam reveals no gallop and no friction rub.    No murmur heard.  Pulmonary/Chest: Effort normal and breath sounds normal. No respiratory distress. She has no wheezes. She has no rales. She exhibits no tenderness.   Diminished breath sound bilateral   Abdominal: Soft. Bowel sounds are normal. She exhibits no distension. There is no tenderness. There is no rebound and no guarding.   Musculoskeletal: She exhibits edema (trace edema bilateral) and tenderness. She exhibits no deformity.   Left foot pain.  Gout flareup.   Lymphadenopathy:     She has no cervical adenopathy.   Neurological: She is alert and oriented to person, place, and time. She displays normal reflexes. No cranial nerve deficit. She exhibits abnormal muscle tone. Coordination abnormal.   Skin: Skin is warm and dry. No rash noted.   Psychiatric: She has a normal mood and affect. Her behavior is normal. Judgment and thought content normal.   Nursing note and vitals reviewed.  Results Review:  Lab Results (last 24 hours)     Procedure Component Value Units Date/Time    Magnesium [[id number]]  (Abnormal) Collected:  05/02/18 0511    Specimen:  Blood Updated:  05/02/18 1219     Magnesium 2.4 (H) mg/dL     Blood Culture With NOEMI - Blood, [[id number]]  (Normal) Collected:  04/29/18 1031    Specimen:  Blood from Arm, Left Updated:  05/02/18 1130     Blood Culture No growth at 3 days     Urine Culture - Urine, Urine, Clean Catch [[id number]]  (Normal) Collected:  05/01/18 2122    Specimen:  Urine from Urine, Clean Catch Updated:  05/02/18 0639     Urine Culture No growth at 24 hours    Comprehensive Metabolic Panel [[id number]]  (Abnormal) Collected:  05/02/18 0511    Specimen:  Blood Updated:  05/02/18 0601     Glucose 118 (H) mg/dL      BUN 61 (H) mg/dL      Creatinine 1.76 (H) mg/dL      Sodium 139 mmol/L      Potassium 3.6 mmol/L      Chloride 94 (L) mmol/L      CO2 34.0 (H) mmol/L      Calcium 8.7 mg/dL      Total Protein 6.3 g/dL      Albumin 3.10 (L) g/dL      ALT (SGPT) 48 U/L      AST (SGOT) 56 (H) U/L      Alkaline Phosphatase 323 (H) U/L      Total Bilirubin 0.9 mg/dL      eGFR Non African Amer 28 (L) mL/min/1.73      Globulin 3.2 gm/dL      A/G Ratio 1.0 (L) g/dL      BUN/Creatinine Ratio 34.7 (H)     Anion Gap 11.0 mmol/L     Narrative:       The MDRD GFR formula is only valid for adults with stable renal function between ages 18 and 70.    CBC & Differential [[id number]] Collected:  05/02/18 0511    Specimen:  Blood Updated:  05/02/18 0539    Narrative:       The following orders were created for panel order CBC & Differential.  Procedure                               Abnormality         Status                     ---------                               -----------         ------                     CBC Auto Differential[[id number]]        Abnormal            Final result                 Please view results for these tests on the individual orders.    CBC Auto Differential [[id number]]  (Abnormal) Collected:  05/02/18 0511    Specimen:  Blood Updated:  05/02/18 0539     WBC 12.50 (H) 10*3/mm3      RBC 4.37 10*6/mm3      Hemoglobin 12.1 g/dL      Hematocrit 35.6 (L) %      MCV 81.5 (L) fL      MCH 27.7 (L) pg      MCHC 34.0 g/dL      RDW 17.5 (H) %      RDW-SD 50.4 fl      MPV 9.8 fL      Platelets 284 10*3/mm3      Neutrophil % 89.4 (H) %      Lymphocyte % 2.9 (L) %      Monocyte % 5.8 %       Eosinophil % 0.1 %      Basophil % 0.1 %      Immature Grans % 1.7 %      Neutrophils, Absolute 11.19 (H) 10*3/mm3      Lymphocytes, Absolute 0.36 (L) 10*3/mm3      Monocytes, Absolute 0.72 10*3/mm3      Eosinophils, Absolute 0.01 10*3/mm3      Basophils, Absolute 0.01 10*3/mm3      Immature Grans, Absolute 0.21 (H) 10*3/mm3      nRBC 0.2 (H) /100 WBC     Urinalysis, Microscopic Only - Urine, Clean Catch [[id number]]  (Abnormal) Collected:  05/01/18 2122    Specimen:  Urine from Urine, Clean Catch Updated:  05/01/18 2139     RBC, UA 3-5 (A) /HPF      WBC, UA 13-20 (A) /HPF      Bacteria, UA None Seen /HPF      Squamous Epithelial Cells, UA 3-6 (A) /HPF      Hyaline Casts, UA 0-2 /LPF      Methodology Automated Microscopy    Urinalysis With / Culture If Indicated - Urine, Clean Catch [[id number]]  (Abnormal) Collected:  05/01/18 2122    Specimen:  Urine from Urine, Clean Catch Updated:  05/01/18 2139     Color, UA Yellow     Appearance, UA Cloudy (A)     pH, UA <=5.0     Specific Gravity, UA 1.019     Glucose, UA Negative     Ketones, UA Negative     Bilirubin, UA Negative     Blood, UA Trace (A)     Protein, UA 30 mg/dL (1+) (A)     Leuk Esterase, UA Moderate (2+) (A)     Nitrite, UA Negative     Urobilinogen, UA 0.2 E.U./dL           Cultures:  Blood Culture   Date Value Ref Range Status   04/29/2018 No growth at 3 days  Preliminary     Urine Culture   Date Value Ref Range Status   05/01/2018 No growth at 24 hours  Preliminary   04/26/2018 No growth at 2 days  Final       Radiology Data:    Imaging Results (last 24 hours)     ** No results found for the last 24 hours. **          Allergies   Allergen Reactions   • Advair Diskus [Fluticasone-Salmeterol] Unknown (See Comments)     Unknown   • Aspirin Unknown (See Comments)     Unknown   • Ciprofloxacin Hcl Unknown (See Comments)     Unknown   • Dexamethasone Unknown (See Comments)     Unknown; 04/30/18: Discussed reported dexamethasone allergy with patient; no  severe reaction recalled (i.e., patient denies experiencing any difficulty breathing, severe rash, or swelling). Patient states she tolerates prednisone at doses below 10 mg/day (reports upset stomach and headache with higher doses) -- Eugene Corley [facility] PharmD     • Ergocalciferol Unknown (See Comments)     Unknown   • Hydrochlorothiazide Unknown (See Comments)     Unknown   • Spironolactone Unknown (See Comments)     Unknown   • Sulfa Antibiotics Unknown (See Comments)     Unknown   • Vitamin D Analogs Unknown (See Comments)     Unknown       Scheduled meds:     allopurinol 100 mg Oral Daily   amiodarone 200 mg Oral Q12H   atorvastatin 10 mg Oral Nightly   famotidine 20 mg Intravenous BID   hydrALAZINE 25 mg Oral Q8H   isosorbide dinitrate 30 mg Oral TID   piperacillin-tazobactam 3.375 g Intravenous Q8H       PRN meds:  •  albuterol  •  magnesium sulfate **OR** magnesium sulfate **OR** magnesium sulfate  •  [DISCONTINUED] Morphine **AND** naloxone  •  [DISCONTINUED] Morphine **AND** naloxone  •  [DISCONTINUED] ondansetron **OR** [DISCONTINUED] ondansetron ODT **OR** ondansetron  •  oxyCODONE  •  potassium chloride **OR** potassium chloride **OR** potassium chloride  •  sodium chloride    Assessment/Plan     Principal Problem:    Acute cholecystitis  Active Problems:    Essential hypertension    Mixed hyperlipidemia    Dilated cardiomyopathy    NSVT (nonsustained ventricular tachycardia)    Acute kidney injury    Acute on chronic systolic congestive heart failure    Gastroesophageal reflux disease without esophagitis      Plan:  Acute on Chronic Systolic CHF-Ejection fraction 20%.  Consult cardiology.  Fluid restriction.  Strict in and out.  Telemetry.      Frequent PVCs/SVT-held Sotalol.   Amiodarone and isosorbide nitrate.  Replete potassium 4 and magnesium 2. Per cardiology.       CVD-Plavix held by general surgery.  Continue Lipitor.     ?cholecystitis vs Nutmeg Liver- on Zosyn. Bilirubin improving.  Per  general surgery.     Acute renal failure-improving.  Acute tubular necrosis and hypoperfusion/cardiorenal syndrome.  Nephrology is on board.     GERD- Pepcid and zofran     Gout flareup-  Dexamethasone 4 mg IM.     Urine culture no growth in 2 days 24 hours. Blood culture no growth in 3 days.     Discharge Plannin-2  days    Micha Loving MD   18   3:02 PM

## 2018-05-03 NOTE — PLAN OF CARE
Problem: Patient Care Overview  Goal: Plan of Care Review  Outcome: Ongoing (interventions implemented as appropriate)   05/03/18 1526   Coping/Psychosocial   Plan of Care Reviewed With patient   Plan of Care Review   Progress improving   OTHER   Outcome Summary Patient has denied pain today. VSS. IV Abx continued per order. Possibly home tomorrow per MD. Continue to monitor overall condition and notify md of any changes.        Problem: Fall Risk (Adult)  Goal: Absence of Fall  Outcome: Ongoing (interventions implemented as appropriate)      Problem: Pain, Acute (Adult)  Goal: Acceptable Pain Control/Comfort Level  Outcome: Ongoing (interventions implemented as appropriate)      Problem: Skin Injury Risk (Adult)  Goal: Skin Health and Integrity  Outcome: Ongoing (interventions implemented as appropriate)      Problem: Cardiac Output Decreased (Adult)  Goal: Effective Tissue Perfusion  Outcome: Ongoing (interventions implemented as appropriate)

## 2018-05-03 NOTE — THERAPY TREATMENT NOTE
Acute Care - Physical Therapy Treatment Note  [facility]     Patient Name: Evelin Leach  : 1945  MRN: [id number]  Today's Date: 5/3/2018  Onset of Illness/Injury or Date of Surgery: 18  Date of Referral to PT: 18  Referring Physician: Dr. Loving    Admit Date: 2018    Visit Dx:    ICD-10-CM ICD-9-CM   1. Acute on chronic systolic congestive heart failure I50.23 428.23     428.0   2. Impaired functional mobility and activity tolerance Z74.09 V49.89     Patient Active Problem List   Diagnosis   • Acute cholecystitis   • Essential hypertension   • Mixed hyperlipidemia   • Dilated cardiomyopathy   • NSVT (nonsustained ventricular tachycardia)   • Acute kidney injury   • Acute on chronic systolic congestive heart failure   • Gastroesophageal reflux disease without esophagitis       Therapy Treatment          Rehabilitation Treatment Summary     Row Name 18 1514 18 1021          Treatment Time/Intention    Discipline physical therapy assistant  -SOSA physical therapy assistant  -CLIFF     Document Type therapy note (daily note)  -SOSA therapy note (daily note)  -JP2     Subjective Information complains of;pain;swelling  -JB2 complains of;pain  -JP2     Mode of Treatment  -- physical therapy  -JP2     Patient Effort  -- good  -JP2     Existing Precautions/Restrictions fall  -JB2 fall  -JP2     Treatment Considerations/Comments gout left foot  -JB2 Gout L foot  -JP3     Recorded by [SOSA] Henry Aguilar, hospitals 18 1515 18 1515  [JB2] Henry Aguilar, hospitals 18 1528 18 1528 [CLIFF] Dana Carvajal, hospitals 18 1022 18 1022  [JP2] Dana Carvajal, hospitals 18 1047 18 1047  [JP3] Dana Carvajal, hospitals 18 1131 18 1131     Row Name 18 1514 18 1021          Bed Mobility Assessment/Treatment    Rolling Right [place] (Bed Mobility)  -- independent  -CLIFF     Supine-Sit [place] (Bed Mobility)  -- independent  -CLIFF     Sit-Supine [place]  (Bed Mobility) independent  -SOSA  --     Supine-Sit-Supine [place] (Bed Mobility) independent  -SOSA  --     Recorded by [SOSA] Henry Aguilar, [facility] 05/03/18 1528 05/03/18 1528 [CLIFF] Dana Carvajal, [facility] 05/03/18 1047 05/03/18 1047     Row Name 05/03/18 1514 05/03/18 1021          Transfer Assessment/Treatment    Sit-Stand [place] (Transfers) verbal cues;stand by assist  -SOSA independent  -CLIFF     Stand-Sit [place] (Transfers) stand by assist  -SOSA independent  -CLIFF     Recorded by [SOSA] Henry Aguilar, PTA 05/03/18 1528 05/03/18 1528 [CLIFF] Dana Carvajal, [facility] 05/03/18 1047 05/03/18 1047     Row Name 05/03/18 1514 05/03/18 1021          Gait/Stairs Assessment/Training    [place] Level (Gait) verbal cues;contact guard  -SOSA supervision  -CLIFF     Assistive Device (Gait) walker, front-wheeled  -SOSA walker, front-wheeled  -CLIFF     Distance in Feet (Gait) 50  -SOSA 50  -CLIFF     Deviations/Abnormal Patterns (Gait) antalgic;bill decreased;stride length decreased  -SOSA left sided deviations;antalgic;bill decreased  -JP2     Bilateral Gait Deviations forward flexed posture  -SOSA  --     Comment (Gait/Stairs) cues to look up, increase step length   -SOSA constant cues to hold head up,look ahead  -CLIFF     Recorded by [SOSA] Henry Aguilar, [facility] 05/03/18 1528 05/03/18 1528 [CLIFF] Dana Carvajal, [facility] 05/03/18 1047 05/03/18 1047  [JP2] Dana Carvajal, [facility] 05/03/18 1131 05/03/18 1131     Row Name 05/03/18 1021             Therapeutic Exercise    Lower Extremity Range of Motion (Therapeutic Exercise) hip flexion/extension, bilateral;hip abduction/adduction, bilateral;knee flexion/extension, bilateral;ankle dorsiflexion/plantar flexion, bilateral  -CLIFF      Exercise Type (Therapeutic Exercise) AROM (active range of motion)  -CLIFF      Position (Therapeutic Exercise) seated  -CLIFF      Sets/Reps (Therapeutic Exercise) 20  -CLIFF      Recorded by [CLIFF] Dana Carvajal, PTA 05/03/18 1047 05/03/18 1047      Row Name 05/03/18 1514              Positioning and Restraints    Pre-Treatment Position in bed  -SOSA      Post Treatment Position bed  -SOSA      In Bed fowlers;call light within reach;encouraged to call for assist;with family/caregiver;side rails up x3  -SOSA      Recorded by [SOSA] Henry Aguilar, [facility] 05/03/18 1528 05/03/18 1528      Row Name 05/03/18 1514 05/03/18 1021          Pain Scale: FACES Pre/Post-Treatment    Pain: FACES Scale, Pretreatment 4-->hurts little more  -SOSA 4-->hurts little more  -CLIFF     Pain: FACES Scale, Post-Treatment 4-->hurts little more  -SOSA 6-->hurts even more  -CLIFF     Pre/Post Treatment Pain Comment left foot gout  -SOSA R foot,gout  -CLIFF     Recorded by [SOSA] Henry Aguilar, [facility] 05/03/18 1528 05/03/18 1528 [CLIFF] Dana Carvajal, [facility] 05/03/18 1047 05/03/18 1047       User Key  (r) = Recorded By, (t) = Taken By, (c) = Cosigned By    Initials Name Effective Dates Discipline    CLIFF Dana Carvajal, [facility] 08/02/16 -  PT    SOSA Aguilar, [facility] 12/08/16 -  PT                     Physical Therapy Education     Title: PT OT SLP Therapies (Active)     Topic: Physical Therapy (Active)     Point: Mobility training (Active)    Learning Progress Summary     Learner Status Readiness Method Response Comment Documented by    Patient Done Acceptance E VU,NR Posture and safety with RWX  05/03/18 1048     Active Acceptance E NR pt educated on progression of PT POC  05/02/18 1408    Family Active Acceptance E NR pt educated on progression of PT POC  05/02/18 1408                      User Key     Initials Effective Dates Name Provider Type Discipline     08/02/16 -  Dana Carvajal, [facility] Physical Therapy Assistant PT     01/08/18 -  Rodrigo Gasca, PT Student PT Student PT                    PT Recommendation and Plan                Outcome Measures     Row Name 05/03/18 1000 05/02/18 1314          How much help from another person do you currently need...    Turning from your back to your side while in flat bed without using  bedrails? 4  -CLIFF 3  -DEWAYNE (r) CS (t) DEWAYNE (c)     Moving from lying on back to sitting on the side of a flat bed without bedrails? 4  -CLIFF 3  -DEWAYNE (r) CS (t) DEWAYNE (c)     Moving to and from a bed to a chair (including a wheelchair)? 4  -CLIFF 3  -DEWAYNE (r) CS (t) DEWAYNE (c)     Standing up from a chair using your arms (e.g., wheelchair, bedside chair)? 4  -CLIFF 4  -DEWAYNE (r) CS (t) DEWAYNE (c)     Climbing 3-5 steps with a railing? 3  -CLIFF 2  -DEWAYNE (r) CS (t) DEWAYNE (c)     To walk in hospital room? 4  -CLIFF 3  -DEWAYNE (r) CS (t) DEWAYNE (c)     AM-PAC 6 Clicks Score 23  -CLIFF 18  -DEWAYNE (r) CS (t)        Functional Assessment    Outcome Measure Options  -- AM-PAC 6 Clicks Basic Mobility (PT)  -DEWAYNE (r) CS (t) DEWAYNE (c)       User Key  (r) = Recorded By, (t) = Taken By, (c) = Cosigned By    Initials Name Provider Type    DEWAYNE Gilbert, PT DPT Physical Therapist    CLFIF Carvajal, ONDINA Physical Therapy Assistant    RASHMI Gasca, PT Student PT Student           Time Calculation:         PT Charges     Row Name 05/03/18 1514             Time Calculation    Start Time 1514  -SOSA      Stop Time 1529  -SOSA      Time Calculation (min) 15 min  -SOSA      PT Received On 05/03/18  -SOSA      PT Goal Re-Cert Due Date 05/12/18  -SOSA         Time Calculation- PT    Total Timed Code Minutes- PT 15 minute(s)  -SOSA        User Key  (r) = Recorded By, (t) = Taken By, (c) = Cosigned By    Initials Name Provider Type    SOSA Aguilar PTA Physical Therapy Assistant          Therapy Charges for Today     Code Description Service Date Service Provider Modifiers Qty    [id number] HC GAIT TRAINING EA 15 MIN 5/3/2018 Henry Aguilar PTA GP 1          PT G-Codes  Outcome Measure Options: AM-PAC 6 Clicks Basic Mobility (PT)  Score: 18  Functional Limitation: Mobility: Walking and moving around  Mobility: Walking and Moving Around Current Status (): At least 40 percent but less than 60 percent impaired, limited or restricted  Mobility: Walking and Moving Around Goal Status  (): At least 20 percent but less than 40 percent impaired, limited or restricted    Henry Aguilar, PTA  5/3/2018

## 2018-05-03 NOTE — PROGRESS NOTES
Nephrology ([facility]) Progress Note      Patient:  Evelin Leach  YOB: 1945  Date of Service: 5/3/2018  MRN: [id number]   Acct: [id number]   Primary Care Physician: Michael Rojas MD  Advance Directive: Conditional Code  Admit Date: 4/23/2018       Hospital Day: 10  Referring Provider: Joanna Munoz MD      Patient personally seen and examined.  Complete chart including Consults, Notes, Operative Reports, Labs, Cardiology, and Radiology studies reviewed as able.        Subjective:  Evelin Leach is a 72 y.o. female  whom we were consulted for acute kidney injury. No prior known renal history.  History of hypertension and chronic systolic congestive heart failure with ejection fraction <20%.  Presented to [place] ER with nausea, vomiting and abdominal pain; was transferred to [facility] for cholecystitis.  Hospital course remarkable for increased dsypnea and edema following administration of IV fluids.  poor response to diuretics.  Became more hypotensive and right heart catheterization done on 4/26 with [place] catheter placement.  Milrinone drip started but discontinued secondary to arrhythmias.  Blood pressure has since improved and PA catheter removed. Transferred to medical floor on 4/30.    Today is feeling well, no new overnight issues.  Urine output nonoliguric.    Allergies:  Advair diskus [fluticasone-salmeterol]; Aspirin; Ciprofloxacin hcl; Ergocalciferol; Hydrochlorothiazide; Spironolactone; Sulfa antibiotics; and Vitamin d analogs    Home Meds:  Prescriptions Prior to Admission   Medication Sig Dispense Refill Last Dose   • albuterol (PROVENTIL HFA;VENTOLIN HFA) 108 (90 Base) MCG/ACT inhaler Inhale 2 puffs Every 4 (Four) Hours As Needed for Wheezing or Shortness of Air.      • allopurinol (ZYLOPRIM) 100 MG tablet Take 100 mg by mouth Daily.      • clopidogrel (PLAVIX) 75 MG tablet Take 75 mg by mouth Daily.      • furosemide (LASIX) 20 MG tablet Take 20  mg by mouth 2 (Two) Times a Day.      • furosemide (LASIX) 20 MG tablet Take 10 mg by mouth Take As Directed. Monday, Wednesday and Friday      • metOLazone (ZAROXOLYN) 2.5 MG tablet Take 2.5 mg by mouth Daily As Needed (PRN edema).      • Omega-3 Fatty Acids (FISH OIL) 1000 MG capsule capsule Take 1,000 mg by mouth Daily With Breakfast.      • potassium chloride (K-DUR) 10 MEQ CR tablet Take 20 mEq by mouth Daily.      • pravastatin (PRAVACHOL) 20 MG tablet Take 20 mg by mouth Every Night.      • predniSONE (DELTASONE) 2.5 MG tablet Take 2.5 mg by mouth Every Other Day.      • Probiotic Product (PROBIOTIC-10 PO) Take 1 tablet by mouth Daily.      • sotalol (BETAPACE) 80 MG tablet Take 40 mg by mouth 2 (Two) Times a Day.          Medicines:  Current Facility-Administered Medications   Medication Dose Route Frequency Provider Last Rate Last Dose   • albuterol (PROVENTIL) nebulizer solution 0.083% 2.5 mg/3mL  2.5 mg Nebulization Q6H PRN Joanna Munoz MD       • allopurinol (ZYLOPRIM) tablet 100 mg  100 mg Oral Daily Beto Wayne MD   100 mg at 05/03/18 0803   • amiodarone (PACERONE) tablet 200 mg  200 mg Oral Q12H Simeon Ca MD   200 mg at 05/03/18 0803   • atorvastatin (LIPITOR) tablet 10 mg  10 mg Oral Nightly Beto Wayne MD   10 mg at 05/02/18 2056   • famotidine (PEPCID) tablet 20 mg  20 mg Oral Nightly Micha Loving MD       • hydrALAZINE (APRESOLINE) tablet 50 mg  50 mg Oral Q8H Dayton Baker MD   50 mg at 05/03/18 0440   • isosorbide dinitrate (ISORDIL) tablet 30 mg  30 mg Oral TID Dayton Baker MD   30 mg at 05/03/18 0800   • Magnesium Sulfate 2 gram Bolus, followed by 8 gram infusion (total Mg dose 10 grams)- Mg less than or equal to 1mg/dL  2 g Intravenous PRN Dayton Baker MD        Or   • Magnesium Sulfate 6 gram Infusion (2 gm x 3) -Mg 1.1 -1.5 mg/dL  2 g Intravenous PRN Dayton Baker MD        Or   • magnesium sulfate 4 gram infusion- Mg 1.6-1.9 mg/dL  4 g Intravenous PRN  Dayton Baker MD       • naloxone (NARCAN) injection 0.4 mg  0.4 mg Intravenous Q5 Min PRN Joanna Munoz MD       • naloxone (NARCAN) injection 0.4 mg  0.4 mg Intravenous Q5 Min PRN Joanna Munoz MD       • ondansetron (ZOFRAN) injection 4 mg  4 mg Intravenous Q4H PRN Micha Loving MD   4 mg at 05/02/18 1557   • oxyCODONE (ROXICODONE) immediate release tablet 5 mg  5 mg Oral Q6H PRN Beto Wayne MD   5 mg at 04/30/18 2021   • piperacillin-tazobactam (ZOSYN) 3.375 g in iso-osmotic dextrose 50 ml (premix)  3.375 g Intravenous Q8H Joanna Munoz MD 0 mL/hr at 05/02/18 1239 3.375 g at 05/03/18 0801   • potassium chloride (MICRO-K) CR capsule 40 mEq  40 mEq Oral PRN Dayton Baker MD   40 mEq at 04/26/18 0849    Or   • potassium chloride (KLOR-CON) packet 40 mEq  40 mEq Oral PRN Dayton Baker MD        Or   • potassium chloride 10 mEq in 100 mL IVPB  10 mEq Intravenous Q1H PRN Dayton Baker MD   Stopped at 04/30/18 1750   • sodium chloride 0.9 % flush 1-10 mL  1-10 mL Intravenous PRN Joanna Munoz MD   10 mL at 05/01/18 0551       Past Medical History:  Past Medical History:   Diagnosis Date   • Arthritis    • CHF (congestive heart failure)    • GERD (gastroesophageal reflux disease)    • Hypertension    • TIA (transient ischemic attack)        Past Surgical History:  Past Surgical History:   Procedure Laterality Date   • CARDIAC CATHETERIZATION     • CARDIAC CATHETERIZATION N/A 4/26/2018    Procedure: Right Heart Cath, possible leave-in SG. Case at 130 please;  Surgeon: Dayton Baker MD;  Location: [facility] CATH INVASIVE LOCATION;  Service: Cardiovascular   • CARDIAC PACEMAKER PLACEMENT         Family History  History reviewed. No pertinent family history.    Social History  Social History     Social History   • Marital status: Legally      Spouse name: N/A   • Number of children: N/A   • Years of education: N/A     Occupational History   • Not on file.     Social History Main Topics   •  "Smoking status: Former Smoker   • Smokeless tobacco: Never Used   • Alcohol use Not on file   • Drug use: Unknown   • Sexual activity: Defer     Other Topics Concern   • Not on file     Social History Narrative   • No narrative on file         Review of Systems:  History obtained from chart review and the patient  General ROS: No fever or chills  Respiratory ROS: No cough or shortness of breath  Cardiovascular ROS: positive for edema, No dyspnea on exertion  Gastrointestinal ROS: No nausea, no abdominal pain  Genito-Urinary ROS: No dysuria or hematuria  Neurological ROS; no headache or dizziness    Objective:  /71 (BP Location: Right arm, Patient Position: Lying)   Pulse 78   Temp 98.1 °F (36.7 °C) (Temporal Artery )   Resp 18   Ht 162.6 cm (64\")   Wt 83.8 kg (184 lb 12.8 oz)   SpO2 93%   BMI 31.72 kg/m²     Intake/Output Summary (Last 24 hours) at 05/03/18 0844  Last data filed at 05/02/18 1937   Gross per 24 hour   Intake              470 ml   Output             1000 ml   Net             -530 ml     General: awake/alert    Neck: supple, no JVD  Chest:  clear to auscultation bilaterally without respiratory distress  CVS: regular rate and rhythm  Abdominal: soft, nontender, normal bowel sounds  Extremities: trace lower ext edema  Skin: warm and dry without rash  Neuro: no focal motor deficits    Labs:    Results from last 7 days  Lab Units 05/03/18  0652 05/02/18  0511 05/01/18  0252   WBC 10*3/mm3 12.15* 12.50* 8.08   HEMOGLOBIN g/dL 11.3* 12.1 11.8*   HEMATOCRIT % 32.8* 35.6* 34.6*   PLATELETS 10*3/mm3 270 284 257           Results from last 7 days  Lab Units 05/03/18  0652 05/02/18  0511 05/01/18  0251   SODIUM mmol/L 137 139 136   POTASSIUM mmol/L 4.0 3.6 4.0   CHLORIDE mmol/L 96* 94* 92*   CO2 mmol/L 31.0 34.0* 34.0*   BUN mg/dL 48* 61* 62*   CREATININE mg/dL 1.58* 1.76* 1.93*   CALCIUM mg/dL 8.3* 8.7 8.4   BILIRUBIN mg/dL 0.8 0.9 1.1*   ALK PHOS U/L 285* 323* 400*   ALT (SGPT) U/L 45 48 53   AST " (SGOT) U/L 51* 56* 65*   GLUCOSE mg/dL 144* 118* 141*       Radiology:   Imaging Results (last 72 hours)     Procedure Component Value Units Date/Time    US Renal Bilateral [[id number]] Collected:  04/26/18 1357     Updated:  04/26/18 1405    Narrative:       RENAL ULTRASOUND COMPLETE 4/26/2018 12:35 PM CDT     REASON FOR EXAM: acute kidney injury; I50.23-Acute on chronic systolic  (congestive) heart failure       COMPARISON: None       TECHNIQUE: Multiple longitudinal and transverse realtime sonographic  images of the kidneys and urinary bladder are obtained.      FINDINGS:      RIGHT KIDNEY: 5 x 6.4 x 11.7 cm. Normal in size, shape, contour and  position. No solid or cystic masses. The central echo complex is compact  with no evidence for hydronephrosis. No nephrolithiasis or abnormal  perinephric fluid collections . No hydroureter. .     The gallbladder is visualized and echogenic sludge is present in the  gallbladder. Common duct is 0.4 cm.     LEFT KIDNEY: 5. 5.5 x 6 x 10.9 cm. Normal in size, shape, contour and  position. No solid or cystic masses. The central echo complex is compact  with no evidence for hydronephrosis. No nephrolithiasis or abnormal  perinephric fluid collections . No hydroureter.      PELVIS: The bladder is mildly distended with anechoic urine and  demonstrates no significant wall thickening or internal echogenicities.  There is no surrounding ascites.        Impression:       1. Negative renal ultrasound.        2. Sludge is noted in the gallbladder.        This report was finalized on 04/26/2018 14:02 by Dr. Marco Islas MD.    XR Chest 1 View [[id number]] Collected:  04/25/18 1423     Updated:  04/25/18 1426    Narrative:       EXAMINATION:   XR CHEST 1 VW-  4/25/2018 2:23 PM CDT     HISTORY: Short of air     Frontal upright radiograph of the chest 4/25/2018 1:10 PM CDT     COMPARISON: April 24, 2018.     FINDINGS:   The lungs are clear. Cardiac silhouettes markedly enlarged.  Pacing  device present soft tissues left chest.      The osseous structures and surrounding soft tissues demonstrate no acute  abnormality.       Impression:       1. Marked cardiomegaly no evidence of pulmonary vascular congestion.        This report was finalized on 04/25/2018 14:23 by Dr. Marco Islas MD.    XR Chest 1 View [[id number]] Collected:  04/24/18 2035     Updated:  04/24/18 2045    Narrative:       Exam:   XR CHEST 1 VW-       Date:  4/24/2018      History:  Female, age  72 years;soa     COMPARISON:  None.     Findings :  There is a left-sided cardiac device in place. Cardiomegaly, moderate to  severe. Low lung volumes. The left lung base is not visualized. The  right lung appears clear.       Impression:       Impression:     Moderate to severe cardiomegaly.  Not visualized left lung base. This may reflect atelectasis and/or  pleural effusion.     This report was finalized on 04/24/2018 20:42 by Dr. Leanna Carballo MD.          Culture:  Urine Culture   Date Value Ref Range Status   04/26/2018 No growth at 24 hours  Preliminary         Assessment   1.  Acute kidney injury from cardiorenal syndrome--improving  2.  Baseline of chronic kidney disease stage 3  3.  Acute on chronic systolic congestive heart failure  4.  Benign hypertension   5.  Hypokalemia--resolved  6.  Hyponatremia--resolved  7.  Metabolic acidosis    Plan:  1.  Monitor labs  2.  Continue titration of Isordil/Hydralazine per Cardiology guidance      Tejinder Grimm, APRN  5/3/2018  8:44 AM

## 2018-05-03 NOTE — PROGRESS NOTES
Continued Stay Note  [facility]     Patient Name: Evelin Lecah  MRN: [id number]  Today's Date: 5/3/2018    Admit Date: 4/23/2018          Discharge Plan     Row Name 05/03/18 0956       Plan    Plan PT continues to plan to dc home and have her daughters staying with her. PT recieves homemakers services through [facility] and wants her HH through the same at dc. SW will send orders once written. Will continue to follow.     Patient/Family in Agreement with Plan yes              Discharge Codes    No documentation.           CECE Casarez

## 2018-05-03 NOTE — PROGRESS NOTES
[facility] Medicine Services  INPATIENT PROGRESS NOTE    Length of Stay: 10  Date of Admission: 4/23/2018  Primary Care Physician: Michael Rojas MD    Subjective   Chief Complaint: Shortness of breath/weakness/left foot pain    HPI   Patient still remained weak.  Patient refuse rehabilitation placement.  For pains much improve after steroid shots.  Kidney function is stable.  Denies any chest pain or shortness of breath.    Review of Systems   Constitutional: Positive for activity change, appetite change and fatigue. Negative for chills and fever.   HENT: Negative for hearing loss, nosebleeds, tinnitus and trouble swallowing.   Eyes: Negative for visual disturbance.   Respiratory: Negative for cough, chest tightness, shortness of breath and wheezing.    Cardiovascular: Negative for chest pain, palpitations and leg swelling.   Gastrointestinal: Negative for abdominal distention, abdominal pain, blood in stool, constipation, diarrhea, nausea and vomiting.   Endocrine: Negative for cold intolerance, heat intolerance, polydipsia, polyphagia and polyuria.   Genitourinary: Negative for decreased urine volume, difficulty urinating, dysuria, flank pain, frequency and hematuria.   Musculoskeletal: Positive for arthralgias, gait problem, joint swelling and myalgias.   Skin: Negative for rash.   Allergic/Immunologic: Negative for immunocompromised state.   Neurological: Positive for weakness. Negative for dizziness, syncope, light-headedness and headaches.   Hematological: Negative for adenopathy. Does not bruise/bleed easily.   Psychiatric/Behavioral: Negative for confusion and sleep disturbance. The patient is not nervous/anxious.         All pertinent negatives and positives are as above. All other systems have been reviewed and are negative unless otherwise stated.     Objective    Temp:  [97 °F (36.1 °C)-98.1 °F (36.7 °C)] 98.1 °F (36.7 °C)  Heart Rate:  [73-93] 73  Resp:  [18]  18  BP: ()/(55-89) 110/55    Intake/Output Summary (Last 24 hours) at 05/03/18 1302  Last data filed at 05/03/18 1052   Gross per 24 hour   Intake              480 ml   Output              600 ml   Net             -120 ml     Physical Exam  Constitutional: She is oriented to person, place, and time. She appears well-developed and well-nourished.   HENT:   Head: Normocephalic and atraumatic.   Eyes: Conjunctivae and EOM are normal. Pupils are equal, round, and reactive to light.   Neck: Neck supple. No JVD present. No thyromegaly present.   Cardiovascular: Normal rate, regular rhythm, normal heart sounds and intact distal pulses.  Exam reveals no gallop and no friction rub.    No murmur heard.  Pulmonary/Chest: Effort normal and breath sounds normal. No respiratory distress. She has no wheezes. She has no rales. She exhibits no tenderness.   Diminished breath sound bilateral   Abdominal: Soft. Bowel sounds are normal. She exhibits no distension. There is no tenderness. There is no rebound and no guarding.   Musculoskeletal: She exhibits edema (trace edema bilateral) and tenderness. She exhibits no deformity.   Left foot pain.  Gout flareup.   Lymphadenopathy:     She has no cervical adenopathy.   Neurological: She is alert and oriented to person, place, and time. She displays normal reflexes. No cranial nerve deficit. She exhibits abnormal muscle tone. Coordination abnormal.   Skin: Skin is warm and dry. No rash noted.   Psychiatric: She has a normal mood and affect. Her behavior is normal. Judgment and thought content normal.   Nursing note and vitals reviewed.  Results Review:  Lab Results (last 24 hours)     Procedure Component Value Units Date/Time    Blood Culture With NOEMI - Blood, [[id number]]  (Normal) Collected:  04/29/18 1031    Specimen:  Blood from Arm, Left Updated:  05/03/18 1130     Blood Culture No growth at 4 days    Comprehensive Metabolic Panel [[id number]]  (Abnormal) Collected:  05/03/18 0652     Specimen:  Blood Updated:  05/03/18 0726     Glucose 144 (H) mg/dL      BUN 48 (H) mg/dL      Creatinine 1.58 (H) mg/dL      Sodium 137 mmol/L      Potassium 4.0 mmol/L      Chloride 96 (L) mmol/L      CO2 31.0 mmol/L      Calcium 8.3 (L) mg/dL      Total Protein 5.9 (L) g/dL      Albumin 2.90 (L) g/dL      ALT (SGPT) 45 U/L      AST (SGOT) 51 (H) U/L      Alkaline Phosphatase 285 (H) U/L      Total Bilirubin 0.8 mg/dL      eGFR Non African Amer 32 (L) mL/min/1.73      Globulin 3.0 gm/dL      A/G Ratio 1.0 (L) g/dL      BUN/Creatinine Ratio 30.4 (H)     Anion Gap 10.0 mmol/L     Narrative:       The MDRD GFR formula is only valid for adults with stable renal function between ages 18 and 70.    CBC & Differential [[id number]] Collected:  05/03/18 0652    Specimen:  Blood Updated:  05/03/18 0711    Narrative:       The following orders were created for panel order CBC & Differential.  Procedure                               Abnormality         Status                     ---------                               -----------         ------                     CBC Auto Differential[[id number]]        Abnormal            Final result                 Please view results for these tests on the individual orders.    CBC Auto Differential [[id number]]  (Abnormal) Collected:  05/03/18 0652    Specimen:  Blood Updated:  05/03/18 0711     WBC 12.15 (H) 10*3/mm3      RBC 4.03 (L) 10*6/mm3      Hemoglobin 11.3 (L) g/dL      Hematocrit 32.8 (L) %      MCV 81.4 (L) fL      MCH 28.0 pg      MCHC 34.5 g/dL      RDW 17.4 (H) %      RDW-SD 51.2 fl      MPV 9.4 fL      Platelets 270 10*3/mm3      Neutrophil % 91.0 (H) %      Lymphocyte % 3.5 (L) %      Monocyte % 4.2 %      Eosinophil % 0.0 %      Basophil % 0.1 %      Immature Grans % 1.2 %      Neutrophils, Absolute 11.06 (H) 10*3/mm3      Lymphocytes, Absolute 0.43 (L) 10*3/mm3      Monocytes, Absolute 0.51 10*3/mm3      Eosinophils, Absolute 0.00 10*3/mm3      Basophils, Absolute 0.01  10*3/mm3      Immature Grans, Absolute 0.14 (H) 10*3/mm3      nRBC 0.0 /100 WBC     Urine Culture - Urine, Urine, Clean Catch [[id number]]  (Normal) Collected:  05/01/18 2122    Specimen:  Urine from Urine, Clean Catch Updated:  05/03/18 0635     Urine Culture No growth at 2 days           Cultures:  Blood Culture   Date Value Ref Range Status   04/29/2018 No growth at 4 days  Preliminary     Urine Culture   Date Value Ref Range Status   05/01/2018 No growth at 2 days  Final   04/26/2018 No growth at 2 days  Final       Radiology Data:    Imaging Results (last 24 hours)     ** No results found for the last 24 hours. **          Allergies   Allergen Reactions   • Advair Diskus [Fluticasone-Salmeterol] Unknown (See Comments)     Unknown   • Aspirin Unknown (See Comments)     Unknown   • Ciprofloxacin Hcl Unknown (See Comments)     Unknown   • Ergocalciferol Unknown (See Comments)     Unknown   • Hydrochlorothiazide Unknown (See Comments)     Unknown   • Spironolactone Unknown (See Comments)     Unknown   • Sulfa Antibiotics Unknown (See Comments)     Unknown   • Vitamin D Analogs Unknown (See Comments)     Unknown       Scheduled meds:     allopurinol 100 mg Oral Daily   amiodarone 200 mg Oral Q12H   atorvastatin 10 mg Oral Nightly   famotidine 20 mg Oral Nightly   hydrALAZINE 50 mg Oral Q8H   isosorbide dinitrate 30 mg Oral TID   piperacillin-tazobactam 3.375 g Intravenous Q8H       PRN meds:  •  albuterol  •  magnesium sulfate **OR** magnesium sulfate **OR** magnesium sulfate  •  [DISCONTINUED] Morphine **AND** naloxone  •  [DISCONTINUED] Morphine **AND** naloxone  •  [DISCONTINUED] ondansetron **OR** [DISCONTINUED] ondansetron ODT **OR** ondansetron  •  oxyCODONE  •  potassium chloride **OR** potassium chloride **OR** potassium chloride  •  sodium chloride    Assessment/Plan     Principal Problem:    Acute cholecystitis  Active Problems:    Essential hypertension    Mixed hyperlipidemia    Dilated cardiomyopathy     NSVT (nonsustained ventricular tachycardia)    Acute kidney injury    Acute on chronic systolic congestive heart failure    Gastroesophageal reflux disease without esophagitis      Plan:  Acute on Chronic Systolic CHF-Ejection fraction 20%.  Consult cardiology.  Fluid restriction.  Strict in and out.  Telemetry.      Frequent PVCs/SVT-   Amiodarone and isosorbide nitrate.  Replete potassium 4 and magnesium 2. Per cardiology.       CVD-Plavix held by general surgery.  Continue Lipitor.     ?cholecystitis vs Nutmeg Liver- on Zosyn. Bilirubin improving.  Per general surgery.     Acute renal failure-improving.  Acute tubular necrosis and hypoperfusion/cardiorenal syndrome.  Nephrology is on board.     GERD- Pepcid and zofran     Gout flareup-  Dexamethasone 4 mg IM.     Urine culture no growth in 2 days. Blood culture no growth in 4 days.     Discharge Plannin-2  days    Micha Loving MD   18   1:02 PM

## 2018-05-03 NOTE — PLAN OF CARE
Problem: Patient Care Overview  Goal: Plan of Care Review  Outcome: Ongoing (interventions implemented as appropriate)   05/03/18 0244   Coping/Psychosocial   Plan of Care Reviewed With patient   Plan of Care Review   Progress improving   OTHER   Outcome Summary VSS. No c/o pain this shift. Hydralazine and isordil doses titrated up today. Will continue to monitor and notify MD of changes.     Goal: Individualization and Mutuality  Outcome: Ongoing (interventions implemented as appropriate)      Problem: Fall Risk (Adult)  Goal: Absence of Fall  Outcome: Ongoing (interventions implemented as appropriate)      Problem: Pain, Acute (Adult)  Goal: Acceptable Pain Control/Comfort Level  Outcome: Ongoing (interventions implemented as appropriate)      Problem: Skin Injury Risk (Adult)  Goal: Skin Health and Integrity  Outcome: Ongoing (interventions implemented as appropriate)      Problem: Cardiac Output Decreased (Adult)  Goal: Effective Tissue Perfusion  Outcome: Ongoing (interventions implemented as appropriate)

## 2018-05-03 NOTE — PLAN OF CARE
Problem: Patient Care Overview  Goal: Plan of Care Review  Outcome: Ongoing (interventions implemented as appropriate)   05/03/18 1048   Coping/Psychosocial   Plan of Care Reviewed With patient   Plan of Care Review   Progress improving   OTHER   Outcome Summary Pt. is independent in bed mobility and transfers. Ambulates 50' with RWX and supervision. Performs LE ex's. Will benefit from increased activity.

## 2018-05-04 NOTE — PROGRESS NOTES
Nephrology ([facility]) Progress Note      Patient:  Evelin Leach  YOB: 1945  Date of Service: 5/4/2018  MRN: [id number]   Acct: [id number]   Primary Care Physician: Michael Rojas MD  Advance Directive: Conditional Code  Admit Date: 4/23/2018       Hospital Day: 11  Referring Provider: Joanna Munoz MD      Patient personally seen and examined.  Complete chart including Consults, Notes, Operative Reports, Labs, Cardiology, and Radiology studies reviewed as able.        Subjective:  Evelin Leach is a 72 y.o. female  whom we were consulted for acute kidney injury. No prior known renal history.  History of hypertension and chronic systolic congestive heart failure with ejection fraction <20%.  Presented to [place] ER with nausea, vomiting and abdominal pain; was transferred to [facility] for cholecystitis.  Hospital course remarkable for increased dsypnea and edema following administration of IV fluids.  poor response to diuretics.  Became more hypotensive and right heart catheterization done on 4/26 with [place] catheter placement.  Milrinone drip started but discontinued secondary to arrhythmias.  Blood pressure has since improved and PA catheter removed. Transferred to medical floor on 4/30.  Has had gout flare in left great toe which has improved with colchicine and IM steroids.    Today is feeling better.  No new overnight issues.  Urine output nonoliguric.    Allergies:  Advair diskus [fluticasone-salmeterol]; Aspirin; Ciprofloxacin hcl; Ergocalciferol; Hydrochlorothiazide; Spironolactone; Sulfa antibiotics; and Vitamin d analogs    Home Meds:  Prescriptions Prior to Admission   Medication Sig Dispense Refill Last Dose   • albuterol (PROVENTIL HFA;VENTOLIN HFA) 108 (90 Base) MCG/ACT inhaler Inhale 2 puffs Every 4 (Four) Hours As Needed for Wheezing or Shortness of Air.      • allopurinol (ZYLOPRIM) 100 MG tablet Take 100 mg by mouth Daily.      • clopidogrel  (PLAVIX) 75 MG tablet Take 75 mg by mouth Daily.      • furosemide (LASIX) 20 MG tablet Take 20 mg by mouth 2 (Two) Times a Day.      • furosemide (LASIX) 20 MG tablet Take 10 mg by mouth Take As Directed. Monday, Wednesday and Friday      • metOLazone (ZAROXOLYN) 2.5 MG tablet Take 2.5 mg by mouth Daily As Needed (PRN edema).      • Omega-3 Fatty Acids (FISH OIL) 1000 MG capsule capsule Take 1,000 mg by mouth Daily With Breakfast.      • potassium chloride (K-DUR) 10 MEQ CR tablet Take 20 mEq by mouth Daily.      • pravastatin (PRAVACHOL) 20 MG tablet Take 20 mg by mouth Every Night.      • predniSONE (DELTASONE) 2.5 MG tablet Take 2.5 mg by mouth Every Other Day.      • Probiotic Product (PROBIOTIC-10 PO) Take 1 tablet by mouth Daily.      • sotalol (BETAPACE) 80 MG tablet Take 40 mg by mouth 2 (Two) Times a Day.          Medicines:  Current Facility-Administered Medications   Medication Dose Route Frequency Provider Last Rate Last Dose   • albuterol (PROVENTIL) nebulizer solution 0.083% 2.5 mg/3mL  2.5 mg Nebulization Q6H PRN Joanna Munoz MD       • allopurinol (ZYLOPRIM) tablet 100 mg  100 mg Oral Daily Beto Wayne MD   100 mg at 05/04/18 0845   • amiodarone (PACERONE) tablet 200 mg  200 mg Oral Q12H Simeon Ca MD   200 mg at 05/04/18 0845   • atorvastatin (LIPITOR) tablet 10 mg  10 mg Oral Nightly Beto Wayne MD   10 mg at 05/03/18 2008   • famotidine (PEPCID) tablet 20 mg  20 mg Oral Nightly Micha Loving MD   20 mg at 05/03/18 2008   • hydrALAZINE (APRESOLINE) tablet 75 mg  75 mg Oral Q8H Dayton Baker MD   75 mg at 05/04/18 0404   • isosorbide dinitrate (ISORDIL) tablet 40 mg  40 mg Oral TID Dayton Baker MD   40 mg at 05/04/18 0845   • Magnesium Sulfate 2 gram Bolus, followed by 8 gram infusion (total Mg dose 10 grams)- Mg less than or equal to 1mg/dL  2 g Intravenous PRN Dayton Baker MD        Or   • Magnesium Sulfate 6 gram Infusion (2 gm x 3) -Mg 1.1 -1.5 mg/dL  2 g  Intravenous PRN Dayton Baker MD        Or   • magnesium sulfate 4 gram infusion- Mg 1.6-1.9 mg/dL  4 g Intravenous PRN Dayton Baker MD       • naloxone (NARCAN) injection 0.4 mg  0.4 mg Intravenous Q5 Min PRN Joanna Munoz MD       • naloxone (NARCAN) injection 0.4 mg  0.4 mg Intravenous Q5 Min PRN Joanna Munoz MD       • ondansetron (ZOFRAN) injection 4 mg  4 mg Intravenous Q4H PRN Micha Loving MD   4 mg at 05/02/18 1557   • oxyCODONE (ROXICODONE) immediate release tablet 5 mg  5 mg Oral Q6H PRN Beto Wayne MD   5 mg at 04/30/18 2021   • piperacillin-tazobactam (ZOSYN) 3.375 g in iso-osmotic dextrose 50 ml (premix)  3.375 g Intravenous Q8H Joanna Munoz MD 0 mL/hr at 05/03/18 1200 3.375 g at 05/04/18 0845   • potassium chloride (MICRO-K) CR capsule 40 mEq  40 mEq Oral PRN Dayton Baker MD   40 mEq at 05/04/18 0845    Or   • potassium chloride (KLOR-CON) packet 40 mEq  40 mEq Oral PRN Dayton Baker MD        Or   • potassium chloride 10 mEq in 100 mL IVPB  10 mEq Intravenous Q1H PRN Dayton Baker MD   Stopped at 04/30/18 1750   • sodium chloride 0.9 % flush 1-10 mL  1-10 mL Intravenous PRN Joanna Munoz MD   10 mL at 05/01/18 0551       Past Medical History:  Past Medical History:   Diagnosis Date   • Arthritis    • CHF (congestive heart failure)    • GERD (gastroesophageal reflux disease)    • Hypertension    • TIA (transient ischemic attack)        Past Surgical History:  Past Surgical History:   Procedure Laterality Date   • CARDIAC CATHETERIZATION     • CARDIAC CATHETERIZATION N/A 4/26/2018    Procedure: Right Heart Cath, possible leave-in SG. Case at 130 please;  Surgeon: Dayton Baker MD;  Location:  [facility] CATH [facility]ASIVE [facility];  Service: Cardiovascular   • CARDIAC PACEMAKER PLACEMENT         Family History  History reviewed. No pertinent family history.    Social History  Social History     Social History   • Marital status: Legally      Spouse name: N/A   • Number  "of children: N/A   • Years of education: N/A     Occupational History   • Not on file.     Social History Main Topics   • Smoking status: Former Smoker   • Smokeless tobacco: Never Used   • Alcohol use Not on file   • Drug use: Unknown   • Sexual activity: Defer     Other Topics Concern   • Not on file     Social History Narrative   • No narrative on file         Review of Systems:  History obtained from chart review and the patient  General ROS: No fever or chills  Respiratory ROS: No cough or shortness of breath  Cardiovascular ROS: positive for edema, No dyspnea on exertion  Gastrointestinal ROS: No nausea, no abdominal pain  Genito-Urinary ROS: No dysuria or hematuria  Neurological ROS; no headache or dizziness    Objective:  /73 (BP Location: Right arm, Patient Position: Sitting)   Pulse 87   Temp 97 °F (36.1 °C) (Temporal Artery )   Resp 18   Ht 162.6 cm (64\")   Wt 83.9 kg (185 lb)   SpO2 95%   BMI 31.76 kg/m²     Intake/Output Summary (Last 24 hours) at 05/04/18 1132  Last data filed at 05/03/18 2334   Gross per 24 hour   Intake              530 ml   Output                0 ml   Net              530 ml     General: awake/alert    Neck: supple, no JVD  Chest:  clear to auscultation bilaterally without respiratory distress  CVS: regular rate and rhythm  Abdominal: soft, nontender, normal bowel sounds  Extremities: trace lower ext edema  Skin: warm and dry without rash  Neuro: no focal motor deficits    Labs:    Results from last 7 days  Lab Units 05/04/18  0658 05/03/18  0652 05/02/18  0511   WBC 10*3/mm3 14.76* 12.15* 12.50*   HEMOGLOBIN g/dL 11.9* 11.3* 12.1   HEMATOCRIT % 34.7* 32.8* 35.6*   PLATELETS 10*3/mm3 276 270 284           Results from last 7 days  Lab Units 05/04/18  0658 05/03/18  0652 05/02/18  0511   SODIUM mmol/L 138 137 139   POTASSIUM mmol/L 3.4* 4.0 3.6   CHLORIDE mmol/L 97* 96* 94*   CO2 mmol/L 33.0* 31.0 34.0*   BUN mg/dL 35* 48* 61*   CREATININE mg/dL 1.33 1.58* 1.76* "   CALCIUM mg/dL 8.4 8.3* 8.7   BILIRUBIN mg/dL 0.8 0.8 0.9   ALK PHOS U/L 266* 285* 323*   ALT (SGPT) U/L 39 45 48   AST (SGOT) U/L 32 51* 56*   GLUCOSE mg/dL 97 144* 118*       Radiology:   Imaging Results (last 72 hours)     Procedure Component Value Units Date/Time    US Renal Bilateral [[id number]] Collected:  04/26/18 1357     Updated:  04/26/18 1405    Narrative:       RENAL ULTRASOUND COMPLETE 4/26/2018 12:35 PM CDT     REASON FOR EXAM: acute kidney injury; I50.23-Acute on chronic systolic  (congestive) heart failure       COMPARISON: None       TECHNIQUE: Multiple longitudinal and transverse realtime sonographic  images of the kidneys and urinary bladder are obtained.      FINDINGS:      RIGHT KIDNEY: 5 x 6.4 x 11.7 cm. Normal in size, shape, contour and  position. No solid or cystic masses. The central echo complex is compact  with no evidence for hydronephrosis. No nephrolithiasis or abnormal  perinephric fluid collections . No hydroureter. .     The gallbladder is visualized and echogenic sludge is present in the  gallbladder. Common duct is 0.4 cm.     LEFT KIDNEY: 5. 5.5 x 6 x 10.9 cm. Normal in size, shape, contour and  position. No solid or cystic masses. The central echo complex is compact  with no evidence for hydronephrosis. No nephrolithiasis or abnormal  perinephric fluid collections . No hydroureter.      PELVIS: The bladder is mildly distended with anechoic urine and  demonstrates no significant wall thickening or internal echogenicities.  There is no surrounding ascites.        Impression:       1. Negative renal ultrasound.        2. Sludge is noted in the gallbladder.        This report was finalized on 04/26/2018 14:02 by Dr. Marco Islas MD.    XR Chest 1 View [[id number]] Collected:  04/25/18 1423     Updated:  04/25/18 1426    Narrative:       EXAMINATION:   XR CHEST 1 VW-  4/25/2018 2:23 PM CDT     HISTORY: Short of air     Frontal upright radiograph of the chest 4/25/2018 1:10 PM CDT      COMPARISON: April 24, 2018.     FINDINGS:   The lungs are clear. Cardiac silhouettes markedly enlarged. Pacing  device present soft tissues left chest.      The osseous structures and surrounding soft tissues demonstrate no acute  abnormality.       Impression:       1. Marked cardiomegaly no evidence of pulmonary vascular congestion.        This report was finalized on 04/25/2018 14:23 by Dr. Marco Islas MD.    XR Chest 1 View [[id number]] Collected:  04/24/18 2035     Updated:  04/24/18 2045    Narrative:       Exam:   XR CHEST 1 VW-       Date:  4/24/2018      History:  Female, age  72 years;soa     COMPARISON:  None.     Findings :  There is a left-sided cardiac device in place. Cardiomegaly, moderate to  severe. Low lung volumes. The left lung base is not visualized. The  right lung appears clear.       Impression:       Impression:     Moderate to severe cardiomegaly.  Not visualized left lung base. This may reflect atelectasis and/or  pleural effusion.     This report was finalized on 04/24/2018 20:42 by Dr. Leanna Carballo MD.          Culture:  Urine Culture   Date Value Ref Range Status   04/26/2018 No growth at 24 hours  Preliminary         Assessment   1.  Acute kidney injury from cardiorenal syndrome--resolving  2.  Baseline of chronic kidney disease stage 3  3.  Acute on chronic systolic congestive heart failure  4.  Benign hypertension   5.  Hypokalemia  6.  Metabolic acidosis    Plan:  1.  Monitor labs  2.  Replace potassium  3.  OK for discharge from renal standpoint; follow up in one week      OSCAR Kessler  5/4/2018  11:32 AM

## 2018-05-04 NOTE — DISCHARGE SUMMARY
[facility] Medicine Services  DISCHARGE SUMMARY       Date of Admission: 4/23/2018  Date of Discharge:  5/4/2018  Primary Care Physician: Michael Rojas MD    Presenting Problem/History of Present Illness:  Acute cholecystitis [K81.0]  Acute on chronic systolic congestive heart failure [I50.23]     Final Discharge Diagnoses:  Hospital Problem List     * (Principal)Acute cholecystitis    Essential hypertension (Chronic)    Mixed hyperlipidemia (Chronic)    Dilated cardiomyopathy (Chronic)    Overview Addendum 4/24/2018  9:41 AM by OSCAR Diaz     EF 45-50% on echo 2012 prior to CABG, improved to 55-60% on last echo 2013. Diastolic dysfunction noted.          NSVT (nonsustained ventricular tachycardia)    Acute kidney injury    Acute on chronic systolic congestive heart failure    Overview Signed 4/26/2018  8:01 AM by Dayton Baker MD     Added automatically from request for surgery [id number]         Gastroesophageal reflux disease without esophagitis (Chronic)          Consults: Cardiology, general surgery    Pertinent Test Results:   Interpretation Summary echocardiogram    · Severe dilated cardiomyopathy (LVEF <20% with severe global hypokinesis).  · Left ventricular diastolic dysfunction is noted (grade II w/high LAP) consistent with pseudonormalization.  · Mild aortic valve stenosis is present.  · The left ventricular cavity is severely dilated.  · Mild mitral valve regurgitation is present  · Left atrial cavity size is severely dilated.  · Estimated right ventricular systolic pressure from tricuspid regurgitation is moderately elevated (45-55 mmHg).  · There is a small (<1cm) circumferential pericardial effusion.     Impression: [place]o catheter placement  1. Elevated right- and left-sided filling pressures  2. Low cardiac output  3.  Successful placement of [place]Reno catheter, capable of obtaining wedge pressure with balloon up with balloon catheter at 54cm (at  tip of sheath)    IMPRESSION: Chest xray  No evidence of acute cardiopulmonary process.    IMPRESSION:US kidney  1. Negative renal ultrasound.    Chief Complaint on Day of Discharge: none    History of Present Illness on Day of Discharge:   Evelin Leach  is a 72 y.o. female presents with abdominal pain which began on Friday progressively worsened through the weekend.  She has had some nausea and threw up yesterday, introducing it in the hopes of feeling better to no avail.  She has had some diarrhea yesterday and today.  She denies fevers.  She has had similar issues in the past.  She states approximately 3-4 years ago she had a gallbladder function tested and it was 17% but elected at that time to not pursue surgery.  She has severe coronary myopathy and congestive heart failure..    Hospital Course:  The patient is a 72 y.o. female who presented to [facility] with possible cholecystitis and advance CHF.      Patient history of gallbladder function tests of 17%.   Gen. surgery consult and cardiologist consult.  Patient came in with elevated liver enzyme.  Very concerning for cholecystitis.  She was started Zosyn antibiotics.  Patient also have elevated white blood cells.  At time goes on what was also trending down.  Liver enzymes also improving.  Bilirubin is also trending down.  Patient's currently asymptomatic.  Patient follow-up with general surgeon as needed.  Patient to continue Augmentin antibiotics for 7 more days.     Echocardiogram with ejection fraction of 20%.  Patient has prevent CHF with lots of ventricular ectopy.  Cardiology, put in a [place]Reno catheter and patient was started milrinone.  Once patient's more stable, this was switched to amiodarone.  Fluid restriction with strict in and out.  Continue her home.  Very difficult control potassium.  Patient keep potassium greater than 4 and magnesium greater than 2, per cardiology during course hospital stay.  As for patient's frequent PVCs  "is stable and SVT is resolved, pt well stable at this time on amiodarone and isosorbide.    Acute renal failure on chronic kidney disease stage III-improving.  Acute tubular necrosis and hypoperfusion/cardiorenal syndrome.  Nephrology was consulted.  Creatinine functions back to baseline.    CVA. Plavix was stop initially  for possible surgery. This will be restart for hx of stroke. Pt to continue with statin.      Gout flareup.  Patient was given colchicine and Dexamethasone.  Patient's symptoms continued to improve.     Vital signs stable, afebrile.  Plan to discharge home today.    Condition on Discharge:   Patient to go home with home health.    Physical Exam on Discharge:  /78 (BP Location: Right arm, Patient Position: Sitting)   Pulse 84   Temp 98.2 °F (36.8 °C) (Oral)   Resp 18   Ht 162.6 cm (64\")   Wt 83.9 kg (185 lb)   SpO2 96%   BMI 31.76 kg/m²   Physical Exam  Constitutional: She is oriented to person, place, and time. She appears well-developed and well-nourished.   HENT:   Head: Normocephalic and atraumatic.   Eyes: Conjunctivae and EOM are normal. Pupils are equal, round, and reactive to light.   Neck: Neck supple. No JVD present. No thyromegaly present.   Cardiovascular: Normal rate, regular rhythm, normal heart sounds and intact distal pulses.  Exam reveals no gallop and no friction rub.    No murmur heard.  Pulmonary/Chest: Effort normal and breath sounds normal. No respiratory distress. She has no wheezes. She has no rales. She exhibits no tenderness.   Diminished breath sound bilateral   Abdominal: Soft. Bowel sounds are normal. She exhibits no distension. There is no tenderness. There is no rebound and no guarding.   Musculoskeletal: She exhibits edema (trace edema bilateral) and tenderness. She exhibits no deformity.   Left foot pain resolved.   Lymphadenopathy:     She has no cervical adenopathy.   Neurological: She is alert and oriented to person, place, and time. She displays " normal reflexes. No cranial nerve deficit. She exhibits abnormal muscle tone. Coordination abnormal.   Skin: Skin is warm and dry. No rash noted.   Psychiatric: She has a normal mood and affect. Her behavior is normal. Judgment and thought content normal.   Nursing note and vitals reviewed.    Discharge Disposition:  Home or Self Care    Discharge Medications:   Evelin Leach   Home Medication Instructions SHAYY:[id number]    Printed on:05/04/18 4498   Medication Information                      albuterol (PROVENTIL HFA;VENTOLIN HFA) 108 (90 Base) MCG/ACT inhaler  Inhale 2 puffs Every 4 (Four) Hours As Needed for Wheezing or Shortness of Air.             allopurinol (ZYLOPRIM) 100 MG tablet  Take 100 mg by mouth Daily.             amiodarone (PACERONE) 200 MG tablet  Take 1 tablet by mouth Every 12 (Twelve) Hours.             amoxicillin-clavulanate (AUGMENTIN) 875-125 MG per tablet  Take 1 tablet by mouth Every 12 (Twelve) Hours.             clopidogrel (PLAVIX) 75 MG tablet  Take 75 mg by mouth Daily.             famotidine (PEPCID) 20 MG tablet  Take 1 tablet by mouth Every Night.             hydrALAZINE (APRESOLINE) 25 MG tablet  Take 3 tablets by mouth Every 8 (Eight) Hours.             isosorbide dinitrate (ISORDIL) 40 MG tablet  Take 1 tablet by mouth 3 (Three) Times a Day.             Omega-3 Fatty Acids (FISH OIL) 1000 MG capsule capsule  Take 1,000 mg by mouth Daily With Breakfast.             potassium chloride (MICRO-K) 10 MEQ CR capsule  Take 2 capsules by mouth 2 (Two) Times a Day.             pravastatin (PRAVACHOL) 20 MG tablet  Take 20 mg by mouth Every Night.             Probiotic Product (PROBIOTIC-10 PO)  Take 1 tablet by mouth Daily.                 Discharge Diet:   Diet Instructions     Advance Diet As Tolerated             Activity at Discharge:   Activity Instructions     Activity as Tolerated             Discharge Care Plan/Instructions: Patient to go home with home health.    Follow-up  Appointments:   Follow-up with cardiology in 2 weeks.  Follow with primary care doctor in 1 week. Follow-up with general surgery as needed.    Micha Loving MD  05/04/18  1:26 PM    Time: Greater than 30 minutes

## 2018-05-04 NOTE — PLAN OF CARE
Problem: Patient Care Overview  Goal: Plan of Care Review  Outcome: Ongoing (interventions implemented as appropriate)   05/04/18 0998   Coping/Psychosocial   Plan of Care Reviewed With patient   Plan of Care Review   Progress improving   OTHER   Outcome Summary Pt. agreeable to therapy. Pt. is independent for bed mobility and transfers. Pt. walked 50' x 2 with one standing rest. Pt. continues to walk with an antalgic gait due to left foot pain. Pt. is hoping to be discharged home soon where she says she has lots of help to assist here.

## 2018-05-04 NOTE — PLAN OF CARE
Problem: Patient Care Overview  Goal: Plan of Care Review  Outcome: Ongoing (interventions implemented as appropriate)   05/04/18 0452   Coping/Psychosocial   Plan of Care Reviewed With patient   Plan of Care Review   Progress improving   OTHER   Outcome Summary S 85-97. No complaints of pain. IV ABX continued. BP meds given as ordered and tolerated. Possible discharge home today. Will continue to monitor.        Problem: Fall Risk (Adult)  Goal: Absence of Fall  Outcome: Ongoing (interventions implemented as appropriate)   05/04/18 0452   Fall Risk (Adult)   Absence of Fall achieves outcome       Problem: Pain, Acute (Adult)  Goal: Acceptable Pain Control/Comfort Level  Outcome: Ongoing (interventions implemented as appropriate)   05/04/18 0452   Pain, Acute (Adult)   Acceptable Pain Control/Comfort Level making progress toward outcome       Problem: Skin Injury Risk (Adult)  Goal: Skin Health and Integrity  Outcome: Ongoing (interventions implemented as appropriate)   05/04/18 0452   Skin Injury Risk (Adult)   Skin Health and Integrity making progress toward outcome       Problem: Cardiac Output Decreased (Adult)  Goal: Effective Tissue Perfusion  Outcome: Ongoing (interventions implemented as appropriate)   05/04/18 0452   Cardiac Output Decreased (Adult)   Effective Tissue Perfusion making progress toward outcome

## 2018-05-04 NOTE — THERAPY TREATMENT NOTE
Acute Care - Physical Therapy Treatment Note  [facility]     Patient Name: Evelin Leach  : 1945  MRN: [id number]  Today's Date: 2018  Onset of Illness/Injury or Date of Surgery: 18  Date of Referral to PT: 18  Referring Physician: Dr. Loving    Admit Date: 2018    Visit Dx:    ICD-10-CM ICD-9-CM   1. Acute on chronic systolic congestive heart failure I50.23 428.23     428.0   2. Impaired functional mobility and activity tolerance Z74.09 V49.89     Patient Active Problem List   Diagnosis   • Acute cholecystitis   • Essential hypertension   • Mixed hyperlipidemia   • Dilated cardiomyopathy   • NSVT (nonsustained ventricular tachycardia)   • Acute kidney injury   • Acute on chronic systolic congestive heart failure   • Gastroesophageal reflux disease without esophagitis       Therapy Treatment          Rehabilitation Treatment Summary     Row Name 18 1049 18 0900          Treatment Time/Intention    Discipline physical therapy assistant  -MF physical therapy assistant  -     Document Type therapy note (daily note)  -MF2  --     Subjective Information complains of;pain  -MF2  --     Mode of Treatment physical therapy  -2  --     Comment  -- eating breakfast  -2     Reason Treatment Not Performed  -- other (see comments)  -MF2     Existing Precautions/Restrictions fall  -MF2  --     Treatment Considerations/Comments gout left foot  -MF2  --     Recorded by [MF] Rachna Campos, [facility] 18 1104 18 1104  [MF2] Rachna Campos, [facility] 18 1107 18 1107 [MF] Rachna Campos, [facility] 18 0900 18 0900  [MF2] Rachna Campos, [facility] 18 1048 18 1048     Row Name 18 1049             Bed Mobility Assessment/Treatment    Supine-Sit [place] (Bed Mobility) independent  -      Sit-Supine [place] (Bed Mobility) independent  -      Recorded by [] Rachna Campos, [facility] 18 1107 18 1107      Row Name 18 1049              Transfer Assessment/Treatment    Comment (Transfers) pt. states she has been ambulating to and from  on her own.   -      Sit-Stand [place] (Transfers) independent  -MF      Stand-Sit [place] (Transfers) independent  -MF      Recorded by [] Rachna Campos, [facility] 05/04/18 1107 05/04/18 1107      Row Name 05/04/18 1049             Gait/Stairs Assessment/Training    [place] Level (Gait) supervision  -      Assistive Device (Gait) walker, front-wheeled  -      Distance in Feet (Gait) 50   x 2 one with standing rest while talking to doctor  -      Deviations/Abnormal Patterns (Gait) antalgic;stride length decreased;bill decreased  -      Comment (Gait/Stairs) Pt. did not want to attempt stairs. Instructed pt. verbally on how to do them correctly   -MF2      Recorded by [] Rachna Campos, PTA 05/04/18 1107 05/04/18 1107  [MF2] Rachna Campos, [facility] 05/04/18 1108 05/04/18 1108      Row Name 05/04/18 1049             Positioning and Restraints    Pre-Treatment Position in bed  -MF      Post Treatment Position bed  -MF      In Bed fowlers;call light within reach;with family/caregiver;side rails up x2  -MF      Recorded by [] Rachna Campos, PTA 05/04/18 1107 05/04/18 1107      Row Name 05/04/18 1049             Pain Scale: Numbers Pre/Post-Treatment    Pain Scale: Numbers, Pretreatment 2/10  -MF      Pain Scale: Numbers, Post-Treatment 2/10  -MF      Pain Location - Side Left  -MF      Pain Location foot  -MF      Recorded by [] Rachna Campos, [facility] 05/04/18 1107 05/04/18 1107        User Key  (r) = Recorded By, (t) = Taken By, (c) = Cosigned By    Initials Name Effective Dates Discipline     Rachna Campos, [facility] 08/02/16 -  PT                     Physical Therapy Education     Title: PT OT SLP Therapies (Active)     Topic: Physical Therapy (Active)     Point: Mobility training (Active)    Learning Progress Summary     Learner Status Readiness Method  Response Comment Documented by    Patient Done Acceptance E VU benefits of activity  05/04/18 1108     Done Acceptance E VU,NR Posture and safety with RWX  05/03/18 1048     Active Acceptance E NR pt educated on progression of PT POC  05/02/18 1408    Family Active Acceptance E NR pt educated on progression of PT POC  05/02/18 1408                      User Key     Initials Effective Dates Name Provider Type Discipline     08/02/16 -  Dana Carvajal, PTA Physical Therapy Assistant PT     08/02/16 -  Rachna Campos, PTA Physical Therapy Assistant PT     01/08/18 -  Rodrigo Gasca, PT Student PT Student PT                    PT Recommendation and Plan     Plan of Care Reviewed With: patient  Progress: improving  Outcome Summary: Pt. agreeable to therapy. Pt. is independent for bed mobility and transfers. Pt. walked 50' x 2 with one standing rest. Pt. continues to walk with an antalgic gait due to left foot pain. Pt. is hoping to be discharged home soon where she says she has lots of help to assist here.           Outcome Measures     Row Name 05/04/18 1049 05/03/18 1000 05/02/18 1314       How much help from another person do you currently need...    Turning from your back to your side while in flat bed without using bedrails? 4  - 4  -CLIFF 3  -DEWAYNE (r) CS (t) DEWAYNE (c)    Moving from lying on back to sitting on the side of a flat bed without bedrails? 4  - 4  -CLIFF 3  -DEWAYNE (r) CS (t) DEWAYNE (c)    Moving to and from a bed to a chair (including a wheelchair)? 4  - 4  -CLIFF 3  -DEWAYNE (r) CS (t) DEWAYNE (c)    Standing up from a chair using your arms (e.g., wheelchair, bedside chair)? 4  - 4  -CLIFF 4  -DEWAYNE (r) CS (t) DEWAYNE (c)    Climbing 3-5 steps with a railing? 3  - 3  -CLIFF 2  -DEWAYNE (r) CS (t) DEWAYNE (c)    To walk in hospital room? 4  - 4  -CLIFF 3  -DEWAYNE (r) CS (t) DEWAYNE (c)    AM-PAC 6 Clicks Score 23  - 23  -CLIFF 18  -DEWAYNE (r) CS (t)       Functional Assessment    Outcome Measure Options AM-PAC 6 Clicks Basic Mobility (PT)   -  -- AM-PAC 6 Clicks Basic Mobility (PT)  -DEWAYNE (r) CS (t) DEWAYNE (c)      User Key  (r) = Recorded By, (t) = Taken By, (c) = Cosigned By    Initials Name Provider Type    DEWAYNE Gilbert, PT DPT Physical Therapist    CLIFF Carvajal, PTA Physical Therapy Assistant     Rachna Campos, ONDINA Physical Therapy Assistant    CS Rodrigo Gasac, PT Student PT Student           Time Calculation:         PT Charges     Row Name 05/04/18 1110             Time Calculation    Start Time 1049  -MF      Stop Time 1105  -      Time Calculation (min) 16 min  -MF      PT Received On 05/04/18  -      PT Goal Re-Cert Due Date 05/12/18  -         Time Calculation- PT    Total Timed Code Minutes- PT 16 minute(s)  -        User Key  (r) = Recorded By, (t) = Taken By, (c) = Cosigned By    Initials Name Provider Type     Rachna Campos, ONDINA Physical Therapy Assistant          Therapy Charges for Today     Code Description Service Date Service Provider Modifiers Qty    [id number] HC GAIT TRAINING EA 15 MIN 5/4/2018 Rachna Campos PTA GP 1          PT G-Codes  Outcome Measure Options: AM-PAC 6 Clicks Basic Mobility (PT)  Score: 18  Functional Limitation: Mobility: Walking and moving around  Mobility: Walking and Moving Around Current Status (): At least 40 percent but less than 60 percent impaired, limited or restricted  Mobility: Walking and Moving Around Goal Status (): At least 20 percent but less than 40 percent impaired, limited or restricted    Rachna Campos PTA  5/4/2018

## 2018-05-04 NOTE — PROGRESS NOTES
[facility] -  Progress Note     LOS: 11 days   Patient Care Team:  Michael Rojas MD as PCP - General (Internal Medicine)    Chief Complaint: f/u CHF       Subjective     Interval History: Patient continues to feel well.  Tolerating increase his Isordil and hydralazine.  Renal function continues to improve.  She denies any dyspnea or edema today. Feeling much better and hopeful to go home today. Questions about medications answered.     Review of Systems:  Review of Systems   Constitution: Negative for malaise/fatigue.   Cardiovascular: Negative for chest pain, claudication, dyspnea on exertion, leg swelling, near-syncope, orthopnea, palpitations, paroxysmal nocturnal dyspnea and syncope.   Respiratory: Negative for shortness of breath.    Hematologic/Lymphatic: Does not bruise/bleed easily.   Musculoskeletal:        +left foot pain       Vital Sign Min/Max for last 24 hours  Temp  Min: 97 °F (36.1 °C)  Max: 98.6 °F (37 °C)   BP  Min: 101/74  Max: 125/73   Pulse  Min: 76  Max: 89   Resp  Min: 16  Max: 18   SpO2  Min: 95 %  Max: 96 %   No Data Recorded   Weight  Min: 83.9 kg (185 lb)  Max: 83.9 kg (185 lb)     1    05/03/18 1925   Weight: 83.9 kg (185 lb)       Physical Exam:   Physical Exam   Constitutional: No distress.   Neck: No JVD present.   Cardiovascular: Normal rate, regular rhythm, S1 normal, S2 normal, normal heart sounds, intact distal pulses and normal pulses.  Frequent extrasystoles are present.   Pulmonary/Chest: Effort normal and breath sounds normal.   Abdominal: Soft. There is no tenderness.   Neurological: She is alert and oriented to person, place, and time.   Skin: Skin is warm and dry.       Medication Review: yes  Current Facility-Administered Medications   Medication Dose Route Frequency Provider Last Rate Last Dose   • albuterol (PROVENTIL) nebulizer solution 0.083% 2.5 mg/3mL  2.5 mg Nebulization Q6H PRN Joanna Munoz MD       • allopurinol (ZYLOPRIM) tablet 100  mg  100 mg Oral Daily Beto Wayne MD   100 mg at 05/04/18 0845   • amiodarone (PACERONE) tablet 200 mg  200 mg Oral Q12H Simeon Ca MD   200 mg at 05/04/18 0845   • atorvastatin (LIPITOR) tablet 10 mg  10 mg Oral Nightly Beto Wayne MD   10 mg at 05/03/18 2008   • famotidine (PEPCID) tablet 20 mg  20 mg Oral Nightly Micha Loving MD   20 mg at 05/03/18 2008   • hydrALAZINE (APRESOLINE) tablet 75 mg  75 mg Oral Q8H Dayton Baker MD   75 mg at 05/04/18 0404   • isosorbide dinitrate (ISORDIL) tablet 40 mg  40 mg Oral TID Dayton Baker MD   40 mg at 05/04/18 0845   • Magnesium Sulfate 2 gram Bolus, followed by 8 gram infusion (total Mg dose 10 grams)- Mg less than or equal to 1mg/dL  2 g Intravenous PRN Dayton Baker MD        Or   • Magnesium Sulfate 6 gram Infusion (2 gm x 3) -Mg 1.1 -1.5 mg/dL  2 g Intravenous PRN Dayton Baker MD        Or   • magnesium sulfate 4 gram infusion- Mg 1.6-1.9 mg/dL  4 g Intravenous PRN Dayton Baker MD       • naloxone (NARCAN) injection 0.4 mg  0.4 mg Intravenous Q5 Min PRN Joanna Munoz MD       • naloxone (NARCAN) injection 0.4 mg  0.4 mg Intravenous Q5 Min PRN Joanna Munoz MD       • ondansetron (ZOFRAN) injection 4 mg  4 mg Intravenous Q4H PRN Micha Loving MD   4 mg at 05/02/18 1557   • oxyCODONE (ROXICODONE) immediate release tablet 5 mg  5 mg Oral Q6H PRN Beto Wayne MD   5 mg at 04/30/18 2021   • piperacillin-tazobactam (ZOSYN) 3.375 g in iso-osmotic dextrose 50 ml (premix)  3.375 g Intravenous Q8H Joanna Munoz MD 0 mL/hr at 05/03/18 1200 3.375 g at 05/04/18 0845   • potassium chloride (MICRO-K) CR capsule 40 mEq  40 mEq Oral PRN Dayton Baker MD   40 mEq at 05/04/18 0845    Or   • potassium chloride (KLOR-CON) packet 40 mEq  40 mEq Oral PRN Dayton Baker MD        Or   • potassium chloride 10 mEq in 100 mL IVPB  10 mEq Intravenous Q1H PRN Dayton Baker MD   Stopped at 04/30/18 1750   • sodium chloride 0.9 % flush 1-10  mL  1-10 mL Intravenous PRN Joanna Munoz MD   10 mL at 05/01/18 0551         Results Review:   Lab Results (last 24 hours)     Procedure Component Value Units Date/Time    Blood Culture With NOEMI - Blood, [[id number]]  (Normal) Collected:  04/29/18 1031    Specimen:  Blood from Arm, Left Updated:  05/04/18 1130     Blood Culture No growth at 5 days    Comprehensive Metabolic Panel [[id number]]  (Abnormal) Collected:  05/04/18 0658    Specimen:  Blood Updated:  05/04/18 0732     Glucose 97 mg/dL      BUN 35 (H) mg/dL      Creatinine 1.33 mg/dL      Sodium 138 mmol/L      Potassium 3.4 (L) mmol/L      Chloride 97 (L) mmol/L      CO2 33.0 (H) mmol/L      Calcium 8.4 mg/dL      Total Protein 6.0 (L) g/dL      Albumin 3.10 (L) g/dL      ALT (SGPT) 39 U/L      AST (SGOT) 32 U/L      Alkaline Phosphatase 266 (H) U/L      Total Bilirubin 0.8 mg/dL      eGFR Non African Amer 39 (L) mL/min/1.73      Globulin 2.9 gm/dL      A/G Ratio 1.1 g/dL      BUN/Creatinine Ratio 26.3 (H)     Anion Gap 8.0 mmol/L     Narrative:       The MDRD GFR formula is only valid for adults with stable renal function between ages 18 and 70.    CBC & Differential [[id number]] Collected:  05/04/18 0658    Specimen:  Blood Updated:  05/04/18 0722    Narrative:       The following orders were created for panel order CBC & Differential.  Procedure                               Abnormality         Status                     ---------                               -----------         ------                     CBC Auto Differential[[id number]]        Abnormal            Final result                 Please view results for these tests on the individual orders.    CBC Auto Differential [[id number]]  (Abnormal) Collected:  05/04/18 0658    Specimen:  Blood Updated:  05/04/18 0722     WBC 14.76 (H) 10*3/mm3      RBC 4.27 10*6/mm3      Hemoglobin 11.9 (L) g/dL      Hematocrit 34.7 (L) %      MCV 81.3 (L) fL      MCH 27.9 (L) pg      MCHC 34.3 g/dL      RDW 17.9 (H)  %      RDW-SD 51.0 fl      MPV 9.4 fL      Platelets 276 10*3/mm3      Neutrophil % 85.9 (H) %      Lymphocyte % 3.7 (L) %      Monocyte % 8.3 %      Eosinophil % 0.5 %      Basophil % 0.2 %      Immature Grans % 1.4 %      Neutrophils, Absolute 12.67 (H) 10*3/mm3      Lymphocytes, Absolute 0.55 (L) 10*3/mm3      Monocytes, Absolute 1.22 10*3/mm3      Eosinophils, Absolute 0.08 10*3/mm3      Basophils, Absolute 0.03 10*3/mm3      Immature Grans, Absolute 0.21 (H) 10*3/mm3      nRBC 0.0 /100 WBC           Principal Problem:  1. Dilated cardiomyopathy  (systolic CHF, non-ischemic, Stage C, NYHA II-III): much improved after starting ISDN/hydralazine  -continue efforts at titrating up to goal doses (ISND 40 TID and hydralazine 75 TID); BP is tolerating thus far  - ISDN 40mg TID and hydralazine 75mg PO TID alternating q4h basis (ISDN at 0800, 1600, and midnight; hydralazine at 0400, 1200, and 2000)  -if remains well-compensated on the above, will consider initiation of low-dose BB at outpatient f/u    Active Problems requiring active management:  2. Frequent PVCs: stable  -continue PO amiodarone- will send amiodarone 200 BID with plans for reducing dose at outpatient    3. NSVT (nonsustained ventricular tachycardia):   stable   -continue PO amiodarone              -keep K>4 and Mg>2       4. BEE (acute kidney injury): improving     Other active or stable conditions:   Acute cholecystitis   Essential hypertension   Mixed hyperlipidemia    Okay discharge home from cardiac standpoint. Discussed in depth medications. Will need potassium replacement at home- was previously taking 80 md daily. Will defer to primary. Patient is not on plavix for cardiac issue- can stop from our cardiac standpoint.    Rommel Esparza, APRN  05/04/18  12:01 PM

## 2018-05-05 NOTE — THERAPY DISCHARGE NOTE
Acute Care - Physical Therapy Discharge Summary  [facility]       Patient Name: Evelin Leach  : 1945  MRN: [id number]    Today's Date: 2018  Onset of Illness/Injury or Date of Surgery: 18    Date of Referral to PT: 18  Referring Physician: Dr. Loving      Admit Date: 2018      PT Recommendation and Plan    Visit Dx:    ICD-10-CM ICD-9-CM   1. Acute on chronic systolic congestive heart failure I50.23 428.23     428.0   2. Impaired functional mobility and activity tolerance Z74.09 V49.89   3. Acute cholecystitis K81.0 575.0   4. Essential hypertension I10 401.9   5. Mixed hyperlipidemia E78.2 272.2   6. Dilated cardiomyopathy I42.0 425.4   7. NSVT (nonsustained ventricular tachycardia) I47.2 427.1   8. Acute kidney injury N17.9 584.9   9. Gastroesophageal reflux disease without esophagitis K21.9 530.81             Outcome Measures     Row Name 18 1049 18 1000 18 1314       How much help from another person do you currently need...    Turning from your back to your side while in flat bed without using bedrails? 4  - 4  -CLIFF 3  -DEWAYNE (r) CS (t) DEWAYNE (c)    Moving from lying on back to sitting on the side of a flat bed without bedrails? 4  - 4  -CLIFF 3  -DEWAYNE (r) CS (t) DEWAYNE (c)    Moving to and from a bed to a chair (including a wheelchair)? 4  - 4  -CLIFF 3  -DEWAYNE (r) CS (t) DEWAYNE (c)    Standing up from a chair using your arms (e.g., wheelchair, bedside chair)? 4  - 4  -CLIFF 4  -DEWAYNE (r) CS (t) DEWAYNE (c)    Climbing 3-5 steps with a railing? 3  - 3  -CLIFF 2  -DEWAYNE (r) CS (t) DEWAYNE (c)    To walk in hospital room? 4  - 4  -CLIFF 3  -DEWAYNE (r) CS (t) DEWAYNE (c)    AM-PAC 6 Clicks Score 23  - 23  -CLIFF 18  -DEWAYNE (r) CS (t)       Functional Assessment    Outcome Measure Options AM-PAC 6 Clicks Basic Mobility (PT)  -MF  -- AM-PAC 6 Clicks Basic Mobility (PT)  -DEWAYNE (r) CS (t) DEWAYNE (c)      User Key  (r) = Recorded By, (t) = Taken By, (c) = Cosigned By    Initials Name Provider Type    DEWAYNE Gilbert, PT DPT  Physical Therapist    CLIFF Carvajal, [facility] Physical Therapy Assistant     Rachna Campos, [facility] [place] Physical Therapy Assistant     Rodrigo Gasca, PT Student PT Student                      PT Rehab Goals     Row Name 05/05/18 0728             Bed Mobility Goal 1 (PT)    Activity/Assistive Device (Bed Mobility Goal 1, PT) rolling to left;rolling to right;sit to supine/supine to sit  -MF      [place] Level/Cues Needed (Bed Mobility Goal 1, PT) independent  -MF      Time Frame (Bed Mobility Goal 1, PT) long term goal (LTG);10 days  -MF      Barriers (Bed Mobility Goal 1, PT) none  -MF      Progress/Outcomes (Bed Mobility Goal 1, PT) goal not met  -MF         Transfer Goal 1 (PT)    Activity/Assistive Device (Transfer Goal 1, PT) sit-to-stand/stand-to-sit;bed-to-chair/chair-to-bed;walker, rolling  -MF      [place] Level/Cues Needed (Transfer Goal 1, PT) independent  -MF      Time Frame (Transfer Goal 1, PT) long term goal (LTG);10 days  -MF      Barriers (Transfers Goal 1, PT) L foot gout  -MF      Progress/Outcome (Transfer Goal 1, PT) goal met  -MF         Gait Training Goal 1 (PT)    Activity/Assistive Device (Gait Training Goal 1, PT) gait (walking locomotion);assistive device use;walker, rolling;decrease fall risk;improve balance and speed;increase endurance/gait distance  -MF      [place] Level (Gait Training Goal 1, PT) independent  -MF      Distance (Gait Goal 1, PT) 50ft  -MF      Time Frame (Gait Training Goal 1, PT) long term goal (LTG);10 days  -MF      Barriers (Gait Training Goal 1, PT) L foot gout  -MF      Progress/Outcome (Gait Training Goal 1, PT) goal not met  -MF         Stairs Goal 1 (PT)    Activity/Assistive Device (Stairs Goal 1, PT) ascending stairs;descending stairs;using handrail  -MF      [place] Level/Cues Needed (Stairs Goal 1, PT) contact guard assist  -MF      Number of Stairs (Stairs Goal 1, PT) 3  -MF      Time Frame (Stairs Goal 1, PT) long term goal  (LTG);10 days  -MF      Barriers (Stairs Goal 1, PT) L foot gout  -MF      Progress/Outcome (Stairs Goal 1, PT) goal not met  -MF        User Key  (r) = Recorded By, (t) = Taken By, (c) = Cosigned By    Initials Name Provider Type Discipline    BELEN Campos PTA Physical Therapy Assistant PT          Therapy Charges for Today     Code Description Service Date Service Provider Modifiers Qty    [id number] HC GAIT TRAINING EA 15 MIN 5/4/2018 Rachna Campos PTA GP 1          PT Discharge Summary  Anticipated Discharge Disposition (PT): home or self care  Reason for Discharge: Discharge from facility  Outcomes Achieved: Patient able to partially acheive established goals  Discharge Destination: Home      Rachna Campos PTA   5/5/2018

## 2018-05-15 PROBLEM — I50.22 CHRONIC SYSTOLIC CONGESTIVE HEART FAILURE (HCC): Status: RESOLVED | Noted: 2018-01-01 | Resolved: 2018-01-01

## 2018-05-15 PROBLEM — I50.22 CHRONIC SYSTOLIC CONGESTIVE HEART FAILURE (HCC): Status: ACTIVE | Noted: 2018-01-01

## 2018-05-15 PROBLEM — M10.9 ACUTE GOUT OF RIGHT FOOT: Status: ACTIVE | Noted: 2018-01-01

## 2018-05-15 PROBLEM — M10.9 ACUTE GOUT OF LEFT FOOT: Status: ACTIVE | Noted: 2018-01-01

## 2018-05-15 NOTE — PROGRESS NOTES
Subjective:     Encounter Date:05/15/2018      Patient ID: Evelin Leach is a 73 y.o. female.    Chief Complaint: chf f/u  73-year-old female followed by Dr. Rojas in [place] for nonischemic dilated cardiomyopathy, status post ICD implantation in [place], whom I met during a recent hospitalization.  It was initially thought that she had acute cholecystitis, and I was asked to see her for preoperative consultation.  After reviewing her history and gathering medical records, it became clear that she had significant left ventricular systolic dysfunction and the treatment for presumed acute cholecystitis with intravenous fluids caused her heart failure to decompensate, requiring admission to the CCU.  She did have a [place] catheter placed and briefly required inotropic therapy.  She did improve and was initiated on afterload reduction therapy with Isordil and hydralazine, both slowly and safely titrated upward to goal doses as established by the A-HeFT trial.  She was discharged on May 4 with prescriptions for Isordil 40 mg 3 times a day and hydralazine 75 mg 3 times a day.  Also, she had previously been treated with sotalol but had very frequent ventricular ectopy and NSVT; ultimately the sotalol was discontinued and she was started on amiodarone with good response.  She was not requiring any diuretics at the time of discharge, and the intent was to follow her closely as an outpatient and decide whether initiation of beta blocker therapy and/or initiation of Entresto could be undertaken pending her clinical response and renal function.  She returns today for her first outpatient evaluation after hospitalization discharge.    She reports she felt very well for the first few days she was at home, able to ambulate throughout her house without dyspnea.  However, she then started developing worsening leg swelling.  Both feet are incredibly painful and red now, which she attributes to a gout attack.  She did have  a flare of gout during the hospitalization which was treated with colchicine (which did cause nausea).  For unclear reasons, though the patient's discharge paperwork did say to continue taking allopurinol, there was some confusion and she has not been receiving that since discharge.    From a heart failure standpoint, she reports she is not having any orthopnea nor worsening of her respiratory status.  No associated chest discomfort.  Palpitations are stable.  No associated lightheadedness or dizziness.    She did see Dr Rojas 5/11 for worsened leg swelling.  Had labs that day (not available for my review); he prescirbed lasix 20mg daily and was told to reduce isordil from 40mg tid to 20mg tid.  She reports no noticeable response to lasix 20mg.        The following portions of the patient's history were reviewed and updated as appropriate: allergies, current medications, past family history, past medical history, past social history, past surgical history and problem list.    Review of Systems   Constitution: Negative for malaise/fatigue.   Cardiovascular: Positive for leg swelling and palpitations. Negative for chest pain, claudication, dyspnea on exertion, near-syncope, orthopnea, paroxysmal nocturnal dyspnea and syncope.   Respiratory: Negative for shortness of breath.    Hematologic/Lymphatic: Does not bruise/bleed easily.   Musculoskeletal: Positive for gout.          Objective:      Vitals:    05/15/18 1533   BP: 124/68   Pulse: 97     Physical Exam   Constitutional: She is oriented to person, place, and time. She appears well-developed and well-nourished.   Neck: No JVD present.   Cardiovascular: Normal rate, regular rhythm, normal heart sounds and intact distal pulses.  Frequent extrasystoles are present.   No murmur heard.  Pulmonary/Chest: Effort normal and breath sounds normal.   Musculoskeletal: She exhibits edema (2+ pedal edema; trace edema to proximal tibia bilaterally) and tenderness (both feet very  swollen and tender; +podagra bilaterally).   Neurological: She is alert and oriented to person, place, and time.   Skin: Skin is warm and dry.       Lab Review:         ECG 12 Lead  Date/Time: 5/15/2018 4:33 PM  Performed by: MARCOS LONGO  Authorized by: MARCOS LONGO   Rhythm: sinus rhythm  Ectopy: frequent PVCs  QRS axis: right  Other findings: PRWP  Clinical impression: abnormal ECG            Results for orders placed during the hospital encounter of 04/23/18   Adult Transthoracic Echo Complete W/ Cont if Necessary Per Protocol    Narrative · Severe dilated cardiomyopathy (LVEF <20% with severe global   hypokinesis).  · Left ventricular diastolic dysfunction is noted (grade II w/high LAP)   consistent with pseudonormalization.  · Mild aortic valve stenosis is present.  · The left ventricular cavity is severely dilated.  · Mild mitral valve regurgitation is present  · Left atrial cavity size is severely dilated.  · Estimated right ventricular systolic pressure from tricuspid   regurgitation is moderately elevated (45-55 mmHg).  · There is a small (<1cm) circumferential pericardial effusion.        LABS FROM DAY OF DISCHARGE:    Assessment/Plan:     Problem List Items Addressed This Visit        Cardiovascular and Mediastinum    Dilated cardiomyopathy - Primary (Chronic)    Overview     EF <20% on echo 4/23/18         Current Assessment & Plan     NYHA III; overall worsening (stable from a respiratory status but she has started to have more leg swelling).  -Recheck BMP and BNP today; has not been responding to 20 mg of Lasix to try to be taken daily 4 days ago so will advise on proper dose changes based on today's labs  -Per A-HeFT trial, there is a mortality benefit in  Americans with low EF congestive heart failure on Isordil/hydralazine therapies, regardless of etiology (ischemic versus nonischemic).  Therefore, continue current therapies but to ease the administration of such, we will discontinue  Isordil at this time and prescribed 120 mg of Imdur to be taken daily.  Continue 75 mg of hydralazine 3 times daily.  -We will wait until patient is better compensated before deciding on starting beta blocker  -After that, if renal function remains normal, we will consider initiating Entresto         Relevant Medications    isosorbide mononitrate (IMDUR) 120 MG 24 hr tablet    Other Relevant Orders    Basic Metabolic Panel    BNP    NSVT (nonsustained ventricular tachycardia)    Current Assessment & Plan     Continue amiodarone; can decrease to 200mg daily         Relevant Medications    isosorbide mononitrate (IMDUR) 120 MG 24 hr tablet       Musculoskeletal and Integument    Acute gout of left foot    Current Assessment & Plan     Advised to resume allopurinol for daily prevention, but likely will need steroids and/or colchicine for acute treatment; asked patient to contact primary provider         Acute gout of right foot    Current Assessment & Plan     Advised to resume allopurinol for daily prevention, but likely will need steroids and/or colchicine for acute treatment; asked patient to contact primary provider          F/u 4 weeks; will call with results of labs and advise what to do with diuretics (may need to increase lasix dose, or perhaps resume metolazone PRN)      Dayton Baker MD  05/15/2018  4:42 PM

## 2018-05-15 NOTE — ASSESSMENT & PLAN NOTE
NYHA III; overall worsening (stable from a respiratory status but she has started to have more leg swelling).  -Recheck BMP and BNP today; has not been responding to 20 mg of Lasix to try to be taken daily 4 days ago so will advise on proper dose changes based on today's labs  -Per A-HeFT trial, there is a mortality benefit in  Americans with low EF congestive heart failure on Isordil/hydralazine therapies, regardless of etiology (ischemic versus nonischemic).  Therefore, continue current therapies but to ease the administration of such, we will discontinue Isordil at this time and prescribed 120 mg of Imdur to be taken daily.  Continue 75 mg of hydralazine 3 times daily.  -We will wait until patient is better compensated before deciding on starting beta blocker  -After that, if renal function remains normal, we will consider initiating Entresto

## 2018-05-15 NOTE — ASSESSMENT & PLAN NOTE
Advised to resume allopurinol for daily prevention, but likely will need steroids and/or colchicine for acute treatment; asked patient to contact primary provider

## 2018-06-19 NOTE — PATIENT INSTRUCTIONS
**Please call us in 2 weeks with update of how you're feeling and how blood pressure is running - we may increase dose of Toprol then    ===================================================  Metoprolol extended-release tablets  What is this medicine?  METOPROLOL (me TOE proe lole) is a beta-blocker. Beta-blockers reduce the workload on the heart and help it to beat more regularly. This medicine is used to treat high blood pressure and to prevent chest pain. It is also used to after a heart attack and to prevent an additional heart attack from occurring.  This medicine may be used for other purposes; ask your health care provider or pharmacist if you have questions.  COMMON BRAND NAME(S): toprol, Toprol XL  What should I tell my health care provider before I take this medicine?  They need to know if you have any of these conditions:  -diabetes  -heart or vessel disease like slow heart rate, worsening heart failure, heart block, sick sinus syndrome or Raynaud's disease  -kidney disease  -liver disease  -lung or breathing disease, like asthma or emphysema  -pheochromocytoma  -thyroid disease  -an unusual or allergic reaction to metoprolol, other beta-blockers, medicines, foods, dyes, or preservatives  -pregnant or trying to get pregnant  -breast-feeding  How should I use this medicine?  Take this medicine by mouth with a glass of water. Follow the directions on the prescription label. Do not crush or chew. Take this medicine with or immediately after meals. Take your doses at regular intervals. Do not take more medicine than directed. Do not stop taking this medicine suddenly. This could lead to serious heart-related effects.  Talk to your pediatrician regarding the use of this medicine in children. While this drug may be prescribed for children as young as 6 years for selected conditions, precautions do apply.  Overdosage: If you think you have taken too much of this medicine contact a poison control center or  emergency room at once.  NOTE: This medicine is only for you. Do not share this medicine with others.  What if I miss a dose?  If you miss a dose, take it as soon as you can. If it is almost time for your next dose, take only that dose. Do not take double or extra doses.  What may interact with this medicine?  This medicine may interact with the following medications:  -certain medicines for blood pressure, heart disease, irregular heart beat  -certain medicines for depression, like monoamine oxidase (MAO) inhibitors, fluoxetine, or paroxetine  -clonidine  -dobutamine  -epinephrine  -isoproterenol  -reserpine  This list may not describe all possible interactions. Give your health care provider a list of all the medicines, herbs, non-prescription drugs, or dietary supplements you use. Also tell them if you smoke, drink alcohol, or use illegal drugs. Some items may interact with your medicine.  What should I watch for while using this medicine?  Visit your doctor or health care professional for regular check ups. Contact your doctor right away if your symptoms worsen. Check your blood pressure and pulse rate regularly. Ask your health care professional what your blood pressure and pulse rate should be, and when you should contact them.  You may get drowsy or dizzy. Do not drive, use machinery, or do anything that needs mental alertness until you know how this medicine affects you. Do not sit or stand up quickly, especially if you are an older patient. This reduces the risk of dizzy or fainting spells. Contact your doctor if these symptoms continue. Alcohol may interfere with the effect of this medicine. Avoid alcoholic drinks.  What side effects may I notice from receiving this medicine?  Side effects that you should report to your doctor or health care professional as soon as possible:  -allergic reactions like skin rash, itching or hives  -cold or numb hands or feet  -depression  -difficulty  breathing  -faint  -fever with sore throat  -irregular heartbeat, chest pain  -rapid weight gain  -swollen legs or ankles  Side effects that usually do not require medical attention (report to your doctor or health care professional if they continue or are bothersome):  -anxiety or nervousness  -change in sex drive or performance  -dry skin  -headache  -nightmares or trouble sleeping  -short term memory loss  -stomach upset or diarrhea  -unusually tired  This list may not describe all possible side effects. Call your doctor for medical advice about side effects. You may report side effects to FDA at 7-634-HEH-4000.  Where should I keep my medicine?  Keep out of the reach of children.  Store at room temperature between 15 and 30 degrees C (59 and 86 degrees F). Throw away any unused medicine after the expiration date.  NOTE: This sheet is a summary. It may not cover all possible information. If you have questions about this medicine, talk to your doctor, pharmacist, or health care provider.  © 2018 [employer] (2014-08-22 14:41:37)

## 2018-06-19 NOTE — ASSESSMENT & PLAN NOTE
NYHA II-III; overall stable/improving since increasing daily lasix to 40mg  -Continue Lasix 40 mg every day, and continue to track daily weights.  When weight increases with associated worsening of respiratory status, take an extra 40 mg of Lasix in the afternoon.  -Start Toprol-XL 25 mg daily  -Continue current doses of Imdur (120 mg daily) and hydralazine 75 mg 3 times a day, as these are the goal doses established by the A-HeFT trial to achieve a mortality benefit in  Americans with low EF congestive heart failure regardless of etiology   -Asked patient to call us back in 2 weeks with update on her blood pressure and how she is feeling; will increase Toprol XL to 50 mg daily at that time if can be tolerated.    -Then, at next clinic follow-up, will consider starting Entresto if renal function remains normal.

## 2018-06-19 NOTE — PROGRESS NOTES
Subjective:     Encounter Date:06/19/2018      Patient ID: Evelin Leach is a 73 y.o. female.    Chief Complaint: CHF f/u  73-year-old female previously followed by Dr. Rojas in [place] for nonischemic dilated cardiomyopathy, status post ICD implantation in [place], whom I met during a hospitalization from 4/23/18-5/4/18.  It was initially thought that she had acute cholecystitis, and I was asked to see her for preoperative consultation.  After reviewing her history and gathering medical records, it became clear that she had significant left ventricular systolic dysfunction and the treatment for presumed acute cholecystitis with intravenous fluids caused her heart failure to decompensate, requiring admission to the CCU.  She did have a [place] catheter placed and briefly required inotropic therapy.  She improved and was initiated on afterload reduction therapy with Isordil and hydralazine, both slowly and safely titrated upward to goal doses as established by the A-HeFT trial.  She was discharged on May 4 with prescriptions for Isordil 40 mg 3 times a day and hydralazine 75 mg 3 times a day.  Also, she had previously been treated with sotalol but had very frequent ventricular ectopy and NSVT; ultimately the sotalol was discontinued during the aformentioned hospital admission, and she was started on amiodarone with good response.  She was not requiring any diuretics at the time of discharge.  She saw Dr Rojas 5/11 for worsened leg swelling and was rx'ed 20mg daily and was told to reduce isordil from 40mg tid to 20mg tid.  She reported no noticeable response to lasix 20mg when I saw her here in my office on 5/15/18 for follow-up, at which time she also reported not having any orthopnea nor worsening of her respiratory status.  At that visit, I did switch her back to trial-directed goal dose of long-acting nitrate, at 120 mg of Imdur daily.  I also rechecked labs that day, and then called and advised her to  increase her Lasix to 40 mg daily to better combat her leg swelling and help her breathing.  Since that visit, she has been [id number] lasix daily and has tracked weight daily. Has been mostly stable on this but occasionally jumps weight ~twice a week from ~173-174# up to 180#.  At these points she's been taking an add'l 20mg tablet, usually with moderate relief.  BP and HR well controlled.  She does have associated worsening of orthopnea and exertional dyspnea when she notices the weight gain.        The following portions of the patient's history were reviewed and updated as appropriate: allergies, current medications, past family history, past medical history, past social history, past surgical history and problem list.    Review of Systems   Constitution: Negative for malaise/fatigue.   Cardiovascular: Positive for dyspnea on exertion and leg swelling. Negative for chest pain, claudication, near-syncope, orthopnea, palpitations, paroxysmal nocturnal dyspnea and syncope.   Respiratory: Positive for shortness of breath.    Hematologic/Lymphatic: Does not bruise/bleed easily.        Objective:      Vitals:    06/19/18 1401   BP: 110/58   Pulse: 85     Physical Exam   Constitutional: She is oriented to person, place, and time. She appears well-developed and well-nourished. No distress.   Neck: No JVD present.   Cardiovascular: Normal rate, regular rhythm, normal heart sounds and intact distal pulses.  Frequent extrasystoles are present. Exam reveals no gallop.    No murmur heard.  Pulmonary/Chest: Effort normal. She has rales (minimal rales at right base; left base dull but o/w all lung fields clear).   Musculoskeletal: She exhibits edema (trace BLE edema).   Neurological: She is alert and oriented to person, place, and time.   Skin: Skin is warm and dry.       Lab Review:         ECG 12 Lead  Date/Time: 6/19/2018 4:48 PM  Performed by: MARCOS LONGO  Authorized by: MARCOS LONGO   Comparison: compared with  previous ECG from 5/15/2018  Similar to previous ECG  Rhythm: sinus rhythm  Ectopy: frequent PVCs and bigeminy  Conduction: non-specific intraventricular conduction delay  QRS axis: right  Clinical impression: abnormal ECG              Assessment/Plan:     Problem List Items Addressed This Visit        Cardiovascular and Mediastinum    Essential hypertension (Chronic)    Current Assessment & Plan     Improving; continue current regimen with changes outlined elsewhere         Relevant Medications    furosemide (LASIX) 40 MG tablet    metoprolol succinate XL (TOPROL-XL) 25 MG 24 hr tablet    Dilated cardiomyopathy - Primary (Chronic)    Overview     EF <20% on echo 4/23/18         Current Assessment & Plan     NYHA II-III; overall stable/improving since increasing daily lasix to 40mg  -Continue Lasix 40 mg every day, and continue to track daily weights.  When weight increases with associated worsening of respiratory status, take an extra 40 mg of Lasix in the afternoon.  -Start Toprol-XL 25 mg daily  -Continue current doses of Imdur (120 mg daily) and hydralazine 75 mg 3 times a day, as these are the goal doses established by the A-HeFT trial to achieve a mortality benefit in  Americans with low EF congestive heart failure regardless of etiology   -Asked patient to call us back in 2 weeks with update on her blood pressure and how she is feeling; will increase Toprol XL to 50 mg daily at that time if can be tolerated.    -Then, at next clinic follow-up, will consider starting Entresto if renal function remains normal.         Relevant Medications    metoprolol succinate XL (TOPROL-XL) 25 MG 24 hr tablet    NSVT (nonsustained ventricular tachycardia)    Current Assessment & Plan     Stable; continue amiodarone but decrease to 200 mg daily         Relevant Medications    metoprolol succinate XL (TOPROL-XL) 25 MG 24 hr tablet        F/u 4 weeks    Dayton Baker MD  06/19/2018  4:54 PM

## 2018-06-20 NOTE — TELEPHONE ENCOUNTER
----- Message from Dayton Baker MD sent at 6/19/2018  4:54 PM CDT -----  Please call and advise the patient that she only needs to take her amiodarone now once daily (200 mg).  Thanks!

## 2018-06-26 PROBLEM — N39.0 ACUTE UTI (URINARY TRACT INFECTION): Status: ACTIVE | Noted: 2018-01-01

## 2018-06-27 PROBLEM — K80.20 CHOLELITHIASIS: Status: ACTIVE | Noted: 2018-01-01

## 2018-07-09 NOTE — DISCHARGE INSTRUCTIONS
"Cholelithiasis  Cholelithiasis is also called \"gallstones.\" It is a kind of gallbladder disease. The gallbladder is an organ that stores a liquid (bile) that helps you digest fat. Gallstones may not cause symptoms (may be silent gallstones) until they cause a blockage, and then they can cause pain (gallbladder attack).  Follow these instructions at home:  · Take over-the-counter and prescription medicines only as told by your doctor.  · Stay at a healthy weight.  · Eat healthy foods. This includes:  ? Eating fewer fatty foods, like fried foods.  ? Eating fewer refined carbs (refined carbohydrates). Refined carbs are breads and grains that are highly processed, like white bread and white rice. Instead, choose whole grains like whole-wheat bread and brown rice.  ? Eating more fiber. Almonds, fresh fruit, and beans are healthy sources of fiber.  · Keep all follow-up visits as told by your doctor. This is important.  Contact a doctor if:  · You have sudden pain in the upper right side of your belly (abdomen). Pain might spread to your right shoulder or your chest. This may be a sign of a gallbladder attack.  · You feel sick to your stomach (are nauseous).  · You throw up (vomit).  · You have been diagnosed with gallstones that have no symptoms and you get:  ? Belly pain.  ? Discomfort, burning, or fullness in the upper part of your belly (indigestion).  Get help right away if:  · You have sudden pain in the upper right side of your belly, and it lasts for more than 2 hours.  · You have belly pain that lasts for more than 5 hours.  · You have a fever or chills.  · You keep feeling sick to your stomach or you keep throwing up.  · Your skin or the whites of your eyes turn yellow (jaundice).  · You have dark-colored pee (urine).  · You have light-colored poop (stool).  Summary  · Cholelithiasis is also called \"gallstones.\"  · The gallbladder is an organ that stores a liquid (bile) that helps you digest fat.  · Silent " gallstones are gallstones that do not cause symptoms.  · A gallbladder attack may cause sudden pain in the upper right side of your belly. Pain might spread to your right shoulder or your chest. If this happens, contact your doctor.  · If you have sudden pain in the upper right side of your belly that lasts for more than 2 hours, get help right away.  This information is not intended to replace advice given to you by your health care provider. Make sure you discuss any questions you have with your health care provider.  Document Released: 06/05/2009 Document Revised: 09/03/2017 Document Reviewed: 09/03/2017  [employer] Interactive Patient Education © 2017 [employer]

## 2018-07-09 NOTE — ED PROVIDER NOTES
Subjective   Patient says she was awakened with RUQ pain about 4 AM.  Relates it to her GB that she has been hospitalized twice in past 3 months for but has been advised to not have surgery due to heart disease.  Actually is feeling better now than earlier this AM.          History provided by:  Patient   used: No    Abdominal Pain   Pain location:  RUQ  Pain quality: aching    Pain radiates to:  Does not radiate  Pain severity:  Severe  Onset quality:  Sudden  Duration:  3 hours  Timing:  Constant  Progression:  Partially resolved  Context: awakening from sleep and eating    Context: not alcohol use, not diet changes, not laxative use, not medication withdrawal, not previous surgeries, not recent illness, not recent sexual activity, not recent travel, not retching, not sick contacts, not suspicious food intake and not trauma    Relieved by:  Nothing  Worsened by:  Nothing  Associated symptoms: anorexia, nausea and vomiting    Associated symptoms: no belching, no chest pain, no chills, no constipation, no cough, no diarrhea, no dysuria, no fatigue, no fever, no flatus, no hematemesis, no hematochezia, no hematuria, no melena, no shortness of breath, no sore throat, no vaginal bleeding and no vaginal discharge    Risk factors: recent hospitalization    Risk factors: no alcohol abuse, no aspirin use, not elderly, has not had multiple surgeries, no NSAID use, not obese and not pregnant        Review of Systems   Constitutional: Negative.  Negative for chills, fatigue and fever.   HENT: Negative.  Negative for sore throat.    Respiratory: Negative.  Negative for cough and shortness of breath.    Cardiovascular: Negative.  Negative for chest pain.   Gastrointestinal: Positive for abdominal pain, anorexia, nausea and vomiting. Negative for constipation, diarrhea, flatus, hematemesis, hematochezia and melena.   Genitourinary: Negative.  Negative for dysuria, hematuria, vaginal bleeding and vaginal  discharge.   Musculoskeletal: Negative.    Skin: Negative.    Neurological: Negative.    Hematological: Negative.    Psychiatric/Behavioral: Negative.    All other systems reviewed and are negative.      Past Medical History:   Diagnosis Date   • Arthritis    • CHF (congestive heart failure) (CMS/[facility]ly [facility])    • GERD (gastroesophageal reflux disease)    • Hyperlipidemia    • Hypertension    • Stroke (CMS/Formerly [facility])    • TIA (transient ischemic attack)        Allergies   Allergen Reactions   • Advair Diskus [Fluticasone-Salmeterol] Unknown (See Comments)     Unknown   • Aspirin Unknown (See Comments)     Unknown   • Ciprofloxacin Hcl Unknown (See Comments)     Unknown   • Ergocalciferol Unknown (See Comments)     Unknown   • Hydrochlorothiazide Unknown (See Comments)     Unknown   • Spironolactone Unknown (See Comments)     Unknown   • Sulfa Antibiotics Unknown (See Comments)     Unknown   • Vitamin D Analogs Unknown (See Comments)     Unknown       Past Surgical History:   Procedure Laterality Date   • CARDIAC CATHETERIZATION     • CARDIAC CATHETERIZATION N/A 4/26/2018    Procedure: Right Heart Cath, possible leave-in SG. Case at 130 please;  Surgeon: Dayton Baker MD;  Location:  [facility] CATH [facility];  Service: Cardiovascular   • CARDIAC PACEMAKER PLACEMENT         Family History   Problem Relation Age of Onset   • Heart failure Mother    • Cancer Father        Social History     Social History   • Marital status: Legally      Social History Main Topics   • Smoking status: Former Smoker   • Smokeless tobacco: Never Used   • Alcohol use No   • Drug use: No   • Sexual activity: Defer     Other Topics Concern   • Not on file       Prior to Admission medications    Medication Sig Start Date End Date Taking? Authorizing Provider   albuterol (PROVENTIL HFA;VENTOLIN HFA) 108 (90 Base) MCG/ACT inhaler Inhale 2 puffs Every 4 (Four) Hours As Needed for Wheezing or Shortness of Air.    Historical Provider, MD    amiodarone (PACERONE) 200 MG tablet Take 1 tablet by mouth Daily. 6/29/18   Micha Loving MD   cefdinir (OMNICEF) 300 MG capsule Take 1 capsule by mouth 2 (Two) Times a Day. 6/28/18   Micha Loving MD   famotidine (PEPCID) 20 MG tablet Take 1 tablet by mouth Every Night. 5/4/18   Micha Loving MD   furosemide (LASIX) 40 MG tablet Take 1 tablet by mouth Daily As Needed (extra fluid retention). 6/19/18   Dayton Baker MD   hydrALAZINE (APRESOLINE) 25 MG tablet Take 3 tablets by mouth Every 8 (Eight) Hours. 5/4/18   OSCAR Muse   isosorbide mononitrate (IMDUR) 120 MG 24 hr tablet Take 1 tablet by mouth Daily. 5/15/18   Dayton Baker MD   potassium chloride (MICRO-K) 10 MEQ CR capsule Take 2 capsules by mouth 2 (Two) Times a Day. 5/4/18   Micha Loving MD   pravastatin (PRAVACHOL) 20 MG tablet Take 20 mg by mouth Every Night.    Historical Provider, MD       Medications   sodium chloride 0.9 % flush 10 mL (not administered)   sodium chloride 0.9 % infusion (not administered)       Vitals:    07/09/18 0655   BP: 117/66   Pulse: 90   Resp: 20   Temp: 97.9 °F (36.6 °C)   SpO2: 97%         Objective   Physical Exam   Constitutional: She is oriented to person, place, and time. She appears well-developed and well-nourished.   HENT:   Head: Normocephalic and atraumatic.   Neck: Normal range of motion. Neck supple.   Cardiovascular: Normal rate and regular rhythm.    Pulmonary/Chest: Effort normal and breath sounds normal.   Abdominal: Soft. Bowel sounds are normal. There is tenderness.   Tender to palpation in RUQ but no rebound or percussion tenderness.   Musculoskeletal: Normal range of motion.   Neurological: She is alert and oriented to person, place, and time.   Skin: Skin is warm and dry.   Psychiatric: She has a normal mood and affect. Her behavior is normal.   Nursing note and vitals reviewed.      Procedures         Lab Results (last 24 hours)     Procedure Component Value Units Date/Time    CBC &  Differential [[id number]] Collected:  07/09/18 0706    Specimen:  Blood Updated:  07/09/18 0728    Narrative:       The following orders were created for panel order CBC & Differential.  Procedure                               Abnormality         Status                     ---------                               -----------         ------                     CBC Auto Differential[[id number]]        Abnormal            Final result                 Please view results for these tests on the individual orders.    Comprehensive Metabolic Panel [[id number]]  (Abnormal) Collected:  07/09/18 0706    Specimen:  Blood Updated:  07/09/18 0739     Glucose 104 (H) mg/dL      BUN 23 (H) mg/dL      Creatinine 1.31 mg/dL      Sodium 137 mmol/L      Potassium 3.9 mmol/L      Chloride 97 (L) mmol/L      CO2 24.0 mmol/L      Calcium 9.1 mg/dL      Total Protein 7.6 g/dL      Albumin 4.10 g/dL      ALT (SGPT) 55 (H) U/L      AST (SGOT) 127 (H) U/L      Alkaline Phosphatase 262 (H) U/L      Total Bilirubin 2.3 (H) mg/dL      eGFR Non African Amer 40 (L) mL/min/1.73      Globulin 3.5 gm/dL      A/G Ratio 1.2 g/dL      BUN/Creatinine Ratio 17.6     Anion Gap 16.0 (H) mmol/L     Narrative:       The MDRD GFR formula is only valid for adults with stable renal function between ages 18 and 70.    Lipase [[id number]]  (Normal) Collected:  07/09/18 0706    Specimen:  Blood Updated:  07/09/18 0739     Lipase 56 U/L     Amylase [[id number]]  (Normal) Collected:  07/09/18 0706    Specimen:  Blood Updated:  07/09/18 0739     Amylase 49 U/L     CBC Auto Differential [[id number]]  (Abnormal) Collected:  07/09/18 0706    Specimen:  Blood Updated:  07/09/18 0728     WBC 8.36 10*3/mm3      RBC 4.02 (L) 10*6/mm3      Hemoglobin 11.0 (L) g/dL      Hematocrit 33.1 (L) %      MCV 82.3 fL      MCH 27.4 (L) pg      MCHC 33.2 g/dL      RDW 17.1 (H) %      RDW-SD 50.0 fl      MPV 9.3 fL      Platelets 313 10*3/mm3      Neutrophil % 81.4 (H) %      Lymphocyte % 8.5  (L) %      Monocyte % 8.4 %      Eosinophil % 0.5 %      Basophil % 0.6 %      Immature Grans % 0.6 %      Neutrophils, Absolute 6.81 10*3/mm3      Lymphocytes, Absolute 0.71 (L) 10*3/mm3      Monocytes, Absolute 0.70 10*3/mm3      Eosinophils, Absolute 0.04 10*3/mm3      Basophils, Absolute 0.05 10*3/mm3      Immature Grans, Absolute 0.05 (H) 10*3/mm3      nRBC 0.0 /100 WBC           No orders to display       ED Course  ED Course as of Jul 09 0804   Mon Jul 09, 2018   0759 Told patient that labs show normal WBC but some worsening of LFT.  Offered to treat as OP or IP and she wants to treat as OP for now.  I did tell her to talk with her physicians again as this seems to be a recurring problem.  [TR]      ED Course User Index  [TR] Idrsi Carballo Jr., MD          MDM  Number of Diagnoses or Management Options  Biliary colic: established and worsening     Amount and/or Complexity of Data Reviewed  Clinical lab tests: ordered and reviewed  Decide to obtain previous medical records or to obtain history from someone other than the patient: yes    Risk of Complications, Morbidity, and/or Mortality  Presenting problems: moderate  Diagnostic procedures: moderate  Management options: moderate    Patient Progress  Patient progress: stable      Final diagnoses:   Biliary colic          Idris Carballo Jr., MD  07/09/18 0804

## 2018-07-18 PROBLEM — I47.20 VENTRICULAR TACHYCARDIA (HCC): Status: ACTIVE | Noted: 2018-01-01

## 2018-07-18 NOTE — PROGRESS NOTES
Subjective:     Encounter Date:07/18/2018      Patient ID: Evelin Leach is a 73 y.o. female.    Chief Complaint: CHF f/u, recent ICD shock  73-year-old female previously followed by Dr. Rojas in [place] for nonischemic dilated cardiomyopathy, status post ICD implantation in [place], whom I met during a hospitalization from 4/23/18-5/4/18.  It was initially thought that she had acute cholecystitis, and I was asked to see her for preoperative consultation.  After reviewing her history and gathering medical records, it became clear that she had significant left ventricular systolic dysfunction and the treatment for presumed acute cholecystitis with intravenous fluids caused her heart failure to decompensate, requiring admission to the CCU.  She did have a [place] catheter placed and briefly required inotropic therapy.  She improved and was initiated on afterload reduction therapy with Isordil and hydralazine, both slowly and safely titrated upward to goal doses as established by the A-HeFT trial.  She was discharged on May 4 with prescriptions for Isordil 40 mg 3 times a day and hydralazine 75 mg 3 times a day.  Also, she had previously been treated with sotalol but had very frequent ventricular ectopy and NSVT; ultimately the sotalol was discontinued during the aformentioned hospital admission, and she was started on amiodarone with good response.  She was not requiring any diuretics at the time of discharge.  She saw Dr Rojas 5/11 for worsened leg swelling and was rx'ed 20mg daily and was told to reduce isordil from 40mg tid to 20mg tid.  She reported no noticeable response to lasix 20mg when I saw her here in my office on 5/15/18 for follow-up, at which time she also reported not having any orthopnea nor worsening of her respiratory status.  At that visit, I did switch her back to trial-directed goal dose of long-acting nitrate, at 120 mg of Imdur daily.      She was last seen here on 6/19/18, at which  point I attempted to start Toprol XL 25mg daily but she ended up being admitted 6/26-6/28/18 for nausea. It was unclear whether symptoms were due to BB or from gallstones.  She was discharged home and had been doing well, with her goal/ideal weight being ~173-174#. At times she has fluctuated up to 180-183  (accompanied by worsened leg swelling, orthopnea, and SINGH).  Additional Lasix doses had been working, but recently her weight around 183#, but has not been responding to extra lasix. Associated with mild worsening of dyspnea, orthopnea, and leg swelling.  Associated with increased cough as well. Has been taking up to Lasix 40mg TID, without improvement. Then was she was back in ED yesterday after ICD shock at home - did not lose consciousness beforehand. Received IV lasix x1 while there.  Lab work showed hypokalemia (3.0), so cardiologist on call was notified and advised the potassium replaced and that she could be safely discharged home.  She did go home last night, with next home weight measurement being 179#.  She returns today for reevaluation.  Other than being scared by the ICD shock, she is doing well.  She does continue to complain of severe and persistent nausea, for which she is taking Zofran frequently.  She also complains of right upper quadrant abdominal pain that is worsened after oral intake.          The following portions of the patient's history were reviewed and updated as appropriate: allergies, current medications, past family history, past medical history, past social history, past surgical history and problem list.    Review of Systems   Constitution: Negative for malaise/fatigue.   Cardiovascular: Positive for dyspnea on exertion. Negative for chest pain, claudication, leg swelling, near-syncope, orthopnea, palpitations, paroxysmal nocturnal dyspnea and syncope.   Respiratory: Negative for shortness of breath.    Hematologic/Lymphatic: Does not bruise/bleed easily.   Gastrointestinal:  Positive for abdominal pain, nausea and vomiting.          Objective:      Vitals:    07/18/18 1251   BP: (!) 80/49   Pulse: 84   SpO2: 96%     Physical Exam   Constitutional: She is oriented to person, place, and time. She appears well-developed and well-nourished.   Neck: JVD present.   Cardiovascular: Normal rate, regular rhythm, normal heart sounds and intact distal pulses.  Frequent extrasystoles are present.   No murmur heard.  Pulmonary/Chest: Effort normal. She has decreased breath sounds in the right lower field and the left lower field.   Musculoskeletal: She exhibits edema (1+ BLE to proximal tibia).   Neurological: She is alert and oriented to person, place, and time.   Skin: Skin is warm and dry.       Lab Review:       Procedures    Component      Latest Ref Rng & Units 5/15/2018 6/26/2018 6/28/2018 7/9/2018           4:45 PM  3:01 PM  5:20 AM  7:06 AM   Glucose      70 - 100 mg/dL 99 125 (H) 94 104 (H)   BUN      5 - 21 mg/dL 12 20 20 23 (H)   Creatinine      0.50 - 1.40 mg/dL 1.05 1.28 1.37 1.31   Sodium      135 - 145 mmol/L 138 131 (L) 135 137   Potassium      3.5 - 5.3 mmol/L 3.9 4.8 4.3 3.9   Chloride      98 - 110 mmol/L 101 96 (L) 100 97 (L)   CO2      24.0 - 31.0 mmol/L 26.0 19.0 (L) 20.0 (L) 24.0   Calcium      8.4 - 10.4 mg/dL 9.0 8.9 8.7 9.1   Total Protein      6.3 - 8.7 g/dL  7.9 6.8 7.6   Albumin      3.50 - 5.00 g/dL  4.20 3.50 4.10   ALT (SGPT)      0 - 54 U/L  33 40 55 (H)   AST (SGOT)      7 - 45 U/L  60 (H) 46 (H) 127 (H)   Alkaline Phosphatase      24 - 120 U/L  133 (H) 151 (H) 262 (H)   Total Bilirubin      0.1 - 1.0 mg/dL  1.8 (H) 1.3 (H) 2.3 (H)   eGFR Non African Amer      >60 mL/min/1.73 51 (L) 41 (L) 38 (L) 40 (L)   Globulin      gm/dL  3.7 3.3 3.5   A/G Ratio      1.1 - 2.5 g/dL  1.1 1.1 1.2   BUN/Creatinine Ratio      7.0 - 25.0 11.4 15.6 14.6 17.6   Anion Gap      4.0 - 13.0 mmol/L 11.0 16.0 (H) 15.0 (H) 16.0 (H)     Component      Latest Ref Rng & Units 7/17/2018            2:17 AM   Glucose      70 - 100 mg/dL 102 (H)   BUN      5 - 21 mg/dL 22 (H)   Creatinine      0.50 - 1.40 mg/dL 1.48 (H)   Sodium      135 - 145 mmol/L 137   Potassium      3.5 - 5.3 mmol/L 3.0 (L)   Chloride      98 - 110 mmol/L 92 (L)   CO2      24.0 - 31.0 mmol/L 28.0   Calcium      8.4 - 10.4 mg/dL 8.9   Total Protein      6.3 - 8.7 g/dL 7.2   Albumin      3.50 - 5.00 g/dL 3.90   ALT (SGPT)      0 - 54 U/L 105 (H)   AST (SGOT)      7 - 45 U/L 174 (H)   Alkaline Phosphatase      24 - 120 U/L 433 (H)   Total Bilirubin      0.1 - 1.0 mg/dL 5.4 (H)   eGFR Non African Amer      >60 mL/min/1.73 35 (L)   Globulin      gm/dL 3.3   A/G Ratio      1.1 - 2.5 g/dL 1.2   BUN/Creatinine Ratio      7.0 - 25.0 14.9   Anion Gap      4.0 - 13.0 mmol/L 17.0 (H)     RECORDS REVIEWED:  -labs from ED trip yesterday: K 3.0.   -Reviewed ED provider note from Dr. Berman  ECG 7/17/18 reivewed: NSR with PACs, LAD, LBBB  Assessment/Plan:     Problem List Items Addressed This Visit        Cardiovascular and Mediastinum    Essential hypertension (Chronic)    Relevant Medications    bumetanide (BUMEX) 1 MG tablet    metOLazone (ZAROXOLYN) 2.5 MG tablet    Dilated cardiomyopathy (CMS/HCC) (Chronic)    Overview     EF <20% on echo 4/23/18         Current Assessment & Plan     Nonischemic, dilated cardio myopathy.  Based on history and exam, is very slightly decompensated at this point.  Will discontinue Lasix in favor of Bumex, for its better GI absorption.  Instructed to take 1 mg daily but can adjust as needed to maintain goal weight.  If Bumex does not help get weight back down to goal, then can use metolazone as needed, which I have prescribed today.  Otherwise, continue current dose of Imdur and hydralazine for afterload reduction (could not tolerate beta blocker).         Ventricular tachycardia (CMS/HCC) - Primary    Current Assessment & Plan     Continue amiodarone.  I suspect her episode that required defibrillation yesterday was  "due to electrolyte abnormalities (potassium was 3.0) due to diuresis and repeated emesis, also possibly due to prolonged QT (due to frequent Zofran use).     I did advise the patient to stop using Zofran, and did provide a prescription for Phenergan to use as needed for nausea instead            Digestive    Cholelithiasis    Current Assessment & Plan     Patient has had recurrent symptoms from this issue, and lab work yesterday certainly appears consistent with obstructive biliary pattern.  She is very eager to have her gallbladder removed.  From a risk assessment standpoint, she would be high risk for major cardiac event (10%) with any non-cardiac surgery (by utilizing Revised Cardiac Risk Index), though it is reassuring to know that we have performed cardiac catheterization and she does not have obstructive coronary disease so she would not be a typical risk for myocardial infarction.  This risks specifically for her would mainly be in the form of decompensated congestive heart failure (ie volume overload) and uncontrolled ventricular arrhythmias.  Both of these conditions can be managed by strict attention to fluid balance and electrolytes.  She has a defibrillator in place so any ventricular arrhythmias should be promptly and appropriately treated.  Currently, since her heart failure is very mildly decompensated, I would not advise operating on her right away until this is better managed.  Her weight today is closer to its baseline, so I suspect within a few days (given the changes in diuretics made today), she should would be at a point where her risk for surgery would be deemed non-modifiable with no \"active\" cardiac conditions, and Dr. Munoz could proceed if he so chooses.           Other Visit Diagnoses     Nausea        Relevant Medications    promethazine (PHENERGAN) tablet 12.5 mg        F/u 3 months  I will be available happy to assist and co-manage the patient if she is scheduled for cholecystectomy " here.  She has an appointment with Dr. Munoz tomorrow    Dayton Baker MD  07/18/2018  9:37 PM

## 2018-07-19 NOTE — ASSESSMENT & PLAN NOTE
Continue amiodarone.  I suspect her episode that required defibrillation yesterday was due to electrolyte abnormalities (potassium was 3.0) due to diuresis and repeated emesis, also possibly due to prolonged QT (due to frequent Zofran use).     I did advise the patient to stop using Zofran, and did provide a prescription for Phenergan to use as needed for nausea instead

## 2018-07-19 NOTE — TELEPHONE ENCOUNTER
Patient daughter calling in wanting her prescription on phenergan from office visit yesterday?  - I seen in the chart it was unverified, so I will call the pharmacy and see what it going on.   Wondering if you wanted her to take the potassium, due to potassium being low in hospital?

## 2018-07-19 NOTE — ASSESSMENT & PLAN NOTE
"Patient has had recurrent symptoms from this issue, and lab work yesterday certainly appears consistent with obstructive biliary pattern.  She is very eager to have her gallbladder removed.  From a risk assessment standpoint, she would be high risk for major cardiac event (10%) with any non-cardiac surgery (by utilizing Revised Cardiac Risk Index), though it is reassuring to know that we have performed cardiac catheterization and she does not have obstructive coronary disease so she would not be a typical risk for myocardial infarction.  This risks specifically for her would mainly be in the form of decompensated congestive heart failure (ie volume overload) and uncontrolled ventricular arrhythmias.  Both of these conditions can be managed by strict attention to fluid balance and electrolytes.  She has a defibrillator in place so any ventricular arrhythmias should be promptly and appropriately treated.  Currently, since her heart failure is very mildly decompensated, I would not advise operating on her right away until this is better managed.  Her weight today is closer to its baseline, so I suspect within a few days (given the changes in diuretics made today), she should would be at a point where her risk for surgery would be deemed non-modifiable with no \"active\" cardiac conditions, and Dr. Munoz could proceed if he so chooses.  "

## 2018-07-19 NOTE — DISCHARGE INSTRUCTIONS
DAY OF SURGERY INSTRUCTIONS        YOUR SURGEON: dr yuli weinberg    PROCEDURE: ***laparoscopic cholecystectomy intraoperative cholangiogram    DATE OF SURGERY: ***7/23/2018    ARRIVAL TIME: AS DIRECTED BY OFFICE    DAY OF SURGERY TAKE ONLY THESE MEDICATIONS UNLESS OTHERWISE INSTRUCTED BY YOUR PHYSICIAN: ***amiodarone, hydralazine, isosorbide        MANAGING PAIN AFTER SURGERY    We know you are probably wondering what your pain will be like after surgery.  Following surgery it is unrealistic to expect you will not have pain.   Pain is how our bodies let us know that something is wrong or cautions us to be careful.  That said, our goal is to make your pain tolerable.    Methods we may use to treat your pain include (oral or IV medications, PCAs, epidurals, nerve blocks, etc.)   While some procedures require IV pain medications for a short time after surgery, transitioning to pain medications by mouth allows for better management of pain.   Your nurse will encourage you to take oral pain medications whenever possible.  IV medications work almost immediately, but only last a short while.  Taking medications by mouth allows for a more constant level of medication in your blood stream for a longer period of time.      Once your pain is out of control it is harder to get back under control.  It is important you are aware when your next dose of pain medication is due.  If you are admitted, your nurse may write the time of your next dose on the white board in your room to help you remember.      We are interested in your pain and encourage you to inform us about aggravating factors during your visit.   Many times a simple repositioning every few hours can make a big difference.    If your physician says it is okay, do not let your pain prevent you from getting out of bed. Be sure to call your nurse for assistance prior to getting up so you do not fall.      Before surgery, please decide your tolerable pain goal.  These  faces help describe the pain ratings we use on a 0-10 scale.   Be prepared to tell us your goal and whether or not you take pain or anxiety medications at home.          BEFORE YOU COME TO THE HOSPITAL  (Pre-op instructions)  • Do not eat, drink, smoke or chew gum after midnight the night before surgery.  This also includes no mints.  • Morning of surgery take only the medicines you have been instructed with a sip of water unless otherwise instructed  by your physician.  • Do not shave, wear makeup or dark nail polish.  • Remove all jewelry including rings.  • Leave anything you consider valuable at home.  • Leave your suitcase in the car until after your surgery.  • Bring the following with you if applicable:  o Picture ID and insurance, Medicare or Medicaid cards  o Co-pay/deductible required by insurance (cash, check, credit card)  o Copy of advance directive, living will or power-of- documents if not brought to PAT  o CPAP or BIPAP mask and tubing  o Relaxation aids (MP3 player, book, magazine)  • On the day of surgery check in at registration located at the main entrance of the hospital.       Outpatient Surgery Guidelines, Adult  Outpatient procedures are those for which the person having the procedure is allowed to go home the same day as the procedure. Various procedures are done on an outpatient basis. You should follow some general guidelines if you will be having an outpatient procedure.  LET YOUR HEALTH CARE PROVIDER KNOW ABOUT:  · Any allergies you have.  · All medicines you are taking, including vitamins, herbs, eye drops, creams, and over-the-counter medicines.  · Previous problems you or members of your family have had with the use of anesthetics.  · Any blood disorders you have.  · Previous surgeries you have had.  · Medical conditions you have.  RISKS AND COMPLICATIONS  Your health care provider will discuss possible risks and complications with you before surgery. Common risks and  complications include:    · Problems due to the use of anesthetics.  · Blood loss and replacement (does not apply to minor surgical procedures).  · Temporary increase in pain due to surgery.  · Uncorrected pain or problems that the surgery was meant to correct.  · Infection.  · New damage.  BEFORE THE PROCEDURE  · Ask your health care provider about changing or stopping your regular medicines. You may need to stop taking certain medicines in the days or weeks before the procedure.  · Stop smoking at least 2 weeks before surgery. This lowers your risk for complications during and after surgery. Ask your health care provider for help with this if needed.  · Eat your usual meals and a light supper the day before surgery. Continue fluid intake. Do not drink alcohol.  · Do not eat or drink after midnight the night before your surgery.   · Arrange for someone to take you home and to stay with you for 24 hours after the procedure. Medicine given for your procedure may affect your ability to drive or to care for yourself.  · Call your health care provider's office if you develop an illness or problem that may prevent you from safely having your procedure.  AFTER THE PROCEDURE  After surgery, you will be taken to a recovery area, where your progress will be monitored. If there are no complications, you will be allowed to go home when you are awake, stable, and taking fluids well. You may have numbness around the surgical site. Healing will take some time. You will have tenderness at the surgical site and may have some swelling and bruising. You may also have some nausea.  HOME CARE INSTRUCTIONS  · Do not drive for 24 hours, or as directed by your health care provider. Do not drive while taking prescription pain medicines.  · Do not drink alcohol for 24 hours.  · Do not make important decisions or sign legal documents for 24 hours.  · You may resume a normal diet and activities as directed.  · Do not lift anything heavier  than 10 pounds (4.5 kg) or play contact sports until your health care provider says it is okay.  · Change your bandages (dressings) as directed.  · Only take over-the-counter or prescription medicines as directed by your health care provider.  · Follow up with your health care provider as directed.  SEEK MEDICAL CARE IF:  · You have increased bleeding (more than a small spot) from the surgical site.  · You have redness, swelling, or increasing pain in the wound.  · You see pus coming from the wound.  · You have a fever.  · You notice a bad smell coming from the wound or dressing.  · You feel lightheaded or faint.  · You develop a rash.  · You have trouble breathing.  · You develop allergies.  MAKE SURE YOU:  · Understand these instructions.  · Will watch your condition.  · Will get help right away if you are not doing well or get worse.     This information is not intended to replace advice given to you by your health care provider. Make sure you discuss any questions you have with your health care provider.     Document Released: 09/12/2002 Document Revised: 05/03/2016 Document Reviewed: 05/22/2014  [employer] Interactive Patient Education ©2016 [employer]       Fall Prevention in Hospitals, Adult  As a hospital patient, your condition and the treatments you receive can increase your risk for falls. Some additional risk factors for falls in a hospital include:  · Being in an unfamiliar environment.  · Being on bed rest.  · Your surgery.  · Taking certain medicines.  · Your tubing requirements, such as intravenous (IV) therapy or catheters.  It is important that you learn how to decrease fall risks while at the hospital. Below are important tips that can help prevent falls.  SAFETY TIPS FOR PREVENTING FALLS  Talk about your risk of falling.  · Ask your health care provider why you are at risk for falling. Is it your medicine, illness, tubing placement, or something else?  · Make a plan with your health care  provider to keep you safe from falls.  · Ask your health care provider or pharmacist about side effects of your medicines. Some medicines can make you dizzy or affect your coordination.  Ask for help.  · Ask for help before getting out of bed. You may need to press your call button.  · Ask for assistance in getting safely to the toilet.  · Ask for a walker or cane to be put at your bedside. Ask that most of the side rails on your bed be placed up before your health care provider leaves the room.  · Ask family or friends to sit with you.  · Ask for things that are out of your reach, such as your glasses, hearing aids, telephone, bedside table, or call button.  Follow these tips to avoid falling:  · Stay lying or seated, rather than standing, while waiting for help.  · Wear rubber-soled slippers or shoes whenever you walk in the hospital.  · Avoid quick, sudden movements.  ¨ Change positions slowly.  ¨ Sit on the side of your bed before standing.  ¨ Stand up slowly and wait before you start to walk.  · Let your health care provider know if there is a spill on the floor.  · Pay careful attention to the medical equipment, electrical cords, and tubes around you.  · When you need help, use your call button by your bed or in the bathroom. Wait for one of your health care providers to help you.  · If you feel dizzy or unsure of your footing, return to bed and wait for assistance.  · Avoid being distracted by the TV, telephone, or another person in your room.  · Do not lean or support yourself on rolling objects, such as IV poles or bedside tables.     This information is not intended to replace advice given to you by your health care provider. Make sure you discuss any questions you have with your health care provider.     Document Released: 12/15/2001 Document Revised: 01/08/2016 Document Reviewed: 08/25/2013  [employer] Interactive Patient Education ©2016 [employer]       Surgical Site Infections FAQs  What is a Surgical  Site Infection (SSI)?  A surgical site infection is an infection that occurs after surgery in the part of the body where the surgery took place. Most patients who have surgery do not develop an infection. However, infections develop in about 1 to 3 out of every 100 patients who have surgery.  Some of the common symptoms of a surgical site infection are:  · Redness and pain around the area where you had surgery  · Drainage of cloudy fluid from your surgical wound  · Fever  Can SSIs be treated?  Yes. Most surgical site infections can be treated with antibiotics. The antibiotic given to you depends on the bacteria (germs) causing the infection. Sometimes patients with SSIs also need another surgery to treat the infection.  What are some of the things that hospitals are doing to prevent SSIs?  To prevent SSIs, doctors, nurses, and other healthcare providers:  · Clean their hands and arms up to their elbows with an antiseptic agent just before the surgery.  · Clean their hands with soap and water or an alcohol-based hand rub before and after caring for each patient.  · May remove some of your hair immediately before your surgery using electric clippers if the hair is in the same area where the procedure will occur. They should not shave you with a razor.  · Wear special hair covers, masks, gowns, and gloves during surgery to keep the surgery area clean.  · Give you antibiotics before your surgery starts. In most cases, you should get antibiotics within 60 minutes before the surgery starts and the antibiotics should be stopped within 24 hours after surgery.  · Clean the skin at the site of your surgery with a special soap that kills germs.  What can I do to help prevent SSIs?  Before your surgery:  · Tell your doctor about other medical problems you may have. Health problems such as allergies, diabetes, and obesity could affect your surgery and your treatment.  · Quit smoking. Patients who smoke get more infections. Talk  to your doctor about how you can quit before your surgery.  · Do not shave near where you will have surgery. Shaving with a razor can irritate your skin and make it easier to develop an infection.  At the time of your surgery:  · Speak up if someone tries to shave you with a razor before surgery. Ask why you need to be shaved and talk with your surgeon if you have any concerns.  · Ask if you will get antibiotics before surgery.  After your surgery:  · Make sure that your healthcare providers clean their hands before examining you, either with soap and water or an alcohol-based hand rub.  · If you do not see your providers clean their hands, please ask them to do so.  · Family and friends who visit you should not touch the surgical wound or dressings.  · Family and friends should clean their hands with soap and water or an alcohol-based hand rub before and after visiting you. If you do not see them clean their hands, ask them to clean their hands.  What do I need to do when I go home from the hospital?  · Before you go home, your doctor or nurse should explain everything you need to know about taking care of your wound. Make sure you understand how to care for your wound before you leave the hospital.  · Always clean your hands before and after caring for your wound.  · Before you go home, make sure you know who to contact if you have questions or problems after you get home.  · If you have any symptoms of an infection, such as redness and pain at the surgery site, drainage, or fever, call your doctor immediately.  If you have additional questions, please ask your doctor or nurse.  Developed and co-sponsored by The Society for Healthcare Epidemiology of [place] (SHEA); Infectious Diseases Society of [place] (IDSA); American Hospital Association; Association for Professionals in Infection Control and Epidemiology (APIC); Centers for Disease Control and Prevention (CDC); and The Joint Commission.     This information  is not intended to replace advice given to you by your health care provider. Make sure you discuss any questions you have with your health care provider.     Document Released: 12/23/2014 Document Revised: 01/08/2016 Document Reviewed: 03/02/2016  [employer] Interactive Patient Education ©2016 [employer].       [facility]  CHG 4% Patient Instruction Sheet    Preparing the Skin Before Surgery  Preparing or “prepping” skin before surgery can reduce the risk of infection at the surgical site. To make the process easier,[facility] has chosen 4% Chlorhexidine Gluconate (CHG) antiseptic solution.   The steps below outline the prepping process and should be carefully followed.                                                                                                                                                      Prep the skin at the following time(s):                                                      We recommend you shower the night before surgery, and again the morning of surgery with the 4% CHG antiseptic solution using half of the bottle and a cloth each time.  Dress in clean clothes/sleepwear after showering.  See instructions below for application.          Do not apply any lotions or moisturizers.       Do not shave the area to be prepped for at least 2 days prior to surgery.    Clipping the hair may be done immediately prior to your surgery at the hospital    if needed.    Directions:  Thoroughly rinse your body with water.  Apply 4% CHG to a cloth and wash skin gently, paying special attention to the operative site.  Rinse again thoroughly.  Once you have begun using this product do not apply anything else to your skin. If itching or redness persists, rinse affected areas and discontinue use.    When using this product:  • Keep out of eyes, ears, and mouth.  • If solution should contact these areas, rinse out promptly and thoroughly with water.  • For external use only.  • Do not use in genital  area, on your face or head.      PATIENT/FAMILY/RESPONSIBLE PARTY VERBALIZES UNDERSTANDING OF ABOVE EDUCATION.  COPY OF PAIN SCALE GIVEN AND REVIEWED WITH VERBALIZED UNDERSTANDING.

## 2018-07-19 NOTE — ASSESSMENT & PLAN NOTE
Nonischemic, dilated cardio myopathy.  Based on history and exam, is very slightly decompensated at this point.  Will discontinue Lasix in favor of Bumex, for its better GI absorption.  Instructed to take 1 mg daily but can adjust as needed to maintain goal weight.  If Bumex does not help get weight back down to goal, then can use metolazone as needed, which I have prescribed today.  Otherwise, continue current dose of Imdur and hydralazine for afterload reduction (could not tolerate beta blocker).

## 2018-07-20 NOTE — TELEPHONE ENCOUNTER
Yes (regarding potassium). She should also have Dr Rojas check her electrolyte (BMP) today or Monday and base amount of potassium required on those results.

## 2018-07-23 PROBLEM — E87.6 HYPOKALEMIA: Status: ACTIVE | Noted: 2018-01-01

## 2018-07-23 NOTE — ANESTHESIA PREPROCEDURE EVALUATION
Anesthesia Evaluation     Patient summary reviewed and Nursing notes reviewed   history of anesthetic complications: PONV  NPO Solid Status: > 8 hours  NPO Liquid Status: > 8 hours           Airway   Mallampati: I  TM distance: >3 FB  Neck ROM: full  No difficulty expected  Dental      Pulmonary     breath sounds clear to auscultation  (+) asthma,   (-) sleep apnea, not a smoker  Cardiovascular   Exercise tolerance: poor (<4 METS)    ECG reviewed  Patient on routine beta blocker and Beta blocker given within 24 hours of surgery  Rhythm: regular  Rate: normal    (+) pacemaker (ForMune) ICD, hypertension, dysrhythmias (v fib/vtach, defibrillator in place), CHF, hyperlipidemia,     ROS comment: Echo7/2018  · Severe dilated cardiomyopathy (LVEF <20% with severe global hypokinesis).  · Left ventricular diastolic dysfunction is noted (grade II w/high LAP) consistent with pseudonormalization.  · Mild aortic valve stenosis is present.  · The left ventricular cavity is severely dilated.  · Mild mitral valve regurgitation is present  · Left atrial cavity size is severely dilated.  · Estimated right ventricular systolic pressure from tricuspid regurgitation is moderately elevated (45-55 mmHg).  · There is a small (<1cm) circumferential pericardial effusion    Heart cath 4/26/18  No significant cad  Impression:  1. Elevated right- and left-sided filling pressures  2. Low cardiac output  3.  Successful placement of [place] catheter, capable of obtaining wedge pressure with balloon up with balloon catheter at 54cm (at tip of sheath)    Neuro/Psych  (+) TIA, CVA,     GI/Hepatic/Renal/Endo    (+) obesity,  GERD,  renal disease (during prior hospitalization) CRI,   (-) liver disease, diabetes    Musculoskeletal     (+) back pain,   Abdominal    Substance History      OB/GYN          Other   (+) arthritis                   Anesthesia Plan    ASA 4     general   (Pt with admission recently after ICD shocked her for vfib.  She was found to have decompensated HF, electrolyte abnormalities, and potential qt prolongation 2/2 zofran use. Dr Baker has been following her closely. He recently increased her diuretic. She presents today for lap jomar. Her weight is 176, down from a high of low 180s but not quite to her baseline of 173. She reports her breathing is improved. Dr Baker has identified her as high risk for perioperative cardiac complications but agrees it is reasonable to proceed if her heart failure has been optimized. CXR clear with cardiomegaly. Lungs CTA. )  intravenous induction   Anesthetic plan and risks discussed with patient.

## 2018-07-24 NOTE — ANESTHESIA PROCEDURE NOTES
Airway  Urgency: elective    Airway not difficult    General Information and Staff    Patient location during procedure: OR  CRNA: FÉLIX ZIMMERMAN    Indications and Patient Condition  Indications for airway management: airway protection    Preoxygenated: yes  MILS maintained throughout  Mask difficulty assessment: 1 - vent by mask    Final Airway Details  Final airway type: endotracheal airway      Successful airway: ETT  Cuffed: yes   Successful intubation technique: direct laryngoscopy  Endotracheal tube insertion site: oral  Blade: [place]  Blade size: #2  ETT size: 7.5 mm  Cormack-Lehane Classification: grade I - full view of glottis  Placement verified by: chest auscultation and capnometry   Cuff volume (mL): 5  Measured from: lips  ETT to lips (cm): 21  Number of attempts at approach: 1

## 2018-07-24 NOTE — ANESTHESIA POSTPROCEDURE EVALUATION
Patient: Evelin Leach    Procedure Summary     Date:  07/24/18 Room / Location:   [facility] OR 09 /  [facility]    Anesthesia Start:  1321 Anesthesia Stop:  1448    Procedure:  CHOLECYSTECTOMY LAPAROSCOPIC (N/A Abdomen) Diagnosis:      Surgeon:  Joanna Munoz MD Provider:  Solitario Pagan CRNA    Anesthesia Type:  general ASA Status:  4          Anesthesia Type: general  Last vitals  BP   117/63 (07/24/18 1550)   Temp   97.6 °F (36.4 °C) (07/24/18 1550)   Pulse   106 (07/24/18 1550)   Resp   16 (07/24/18 1550)     SpO2   95 % (07/24/18 1550)     Post Anesthesia Care and Evaluation    Patient location during evaluation: PACU  Patient participation: complete - patient participated  Level of consciousness: awake  Pain management: adequate  Airway patency: patent  Anesthetic complications: No anesthetic complications  PONV Status: none  Cardiovascular status: acceptable  Respiratory status: acceptable  Hydration status: acceptable    Comments: Pt discharged per JASVIR score. See nursing notes.    ---------------------------               07/24/18                      1550         ---------------------------   BP:          117/63         Pulse:         106          Resp:          16           Temp:   97.6 °F (36.4 °C)   SpO2:          95%         ---------------------------  No anesthesia care post op

## 2018-07-26 PROBLEM — R52 PAIN: Status: ACTIVE | Noted: 2018-01-01

## 2018-07-26 NOTE — TELEPHONE ENCOUNTER
I have tried to contact patients sister each day since message has been sent to me, with no answer.

## 2018-09-07 ENCOUNTER — HOSPITAL ENCOUNTER (EMERGENCY)
Dept: HOSPITAL 58 - ED | Age: 73
Discharge: ANOTHER HOSPITAL | End: 2018-09-07

## 2018-09-07 VITALS — SYSTOLIC BLOOD PRESSURE: 100 MMHG | TEMPERATURE: 97.6 F | DIASTOLIC BLOOD PRESSURE: 67 MMHG

## 2018-09-07 VITALS — BODY MASS INDEX: 29.8 KG/M2

## 2018-09-07 DIAGNOSIS — R07.9: Primary | ICD-10-CM

## 2018-09-07 DIAGNOSIS — I49.3: ICD-10-CM

## 2018-09-07 DIAGNOSIS — I25.10: ICD-10-CM

## 2018-09-07 DIAGNOSIS — Z79.899: ICD-10-CM

## 2018-09-07 DIAGNOSIS — R11.2: ICD-10-CM

## 2018-09-07 DIAGNOSIS — D64.9: ICD-10-CM

## 2018-09-07 DIAGNOSIS — I10: ICD-10-CM

## 2018-09-07 DIAGNOSIS — Z86.73: ICD-10-CM

## 2018-09-07 DIAGNOSIS — R79.89: ICD-10-CM

## 2018-09-07 DIAGNOSIS — M54.6: ICD-10-CM

## 2018-09-07 DIAGNOSIS — E11.9: ICD-10-CM

## 2018-09-07 DIAGNOSIS — Z95.0: ICD-10-CM

## 2018-09-07 PROCEDURE — 93010 ELECTROCARDIOGRAM REPORT: CPT

## 2018-09-07 PROCEDURE — 83690 ASSAY OF LIPASE: CPT

## 2018-09-07 PROCEDURE — 80053 COMPREHEN METABOLIC PANEL: CPT

## 2018-09-07 PROCEDURE — 82150 ASSAY OF AMYLASE: CPT

## 2018-09-07 PROCEDURE — 99285 EMERGENCY DEPT VISIT HI MDM: CPT

## 2018-09-07 PROCEDURE — 82550 ASSAY OF CK (CPK): CPT

## 2018-09-07 PROCEDURE — 85025 COMPLETE CBC W/AUTO DIFF WBC: CPT

## 2018-09-07 PROCEDURE — 84484 ASSAY OF TROPONIN QUANT: CPT

## 2018-09-07 PROCEDURE — 36415 COLL VENOUS BLD VENIPUNCTURE: CPT

## 2018-09-07 PROCEDURE — 93005 ELECTROCARDIOGRAM TRACING: CPT

## 2018-09-07 NOTE — ED.PDOC
General


ED Provider: 


Dr. MARY KATE WAGNER





Chief Complaint: Nausea/Vomiting


Stated Complaint: shoulder pains and post chest wall pain, nauseated


Time Seen by Physician: 16:23 (seen with pt's nurse ALEX )


Mode of Arrival: Wheelchair


Information Source: Patient


Exam Limitations: No limitations


Primary Care Provider: 


MARY WANG





Nursing and Triage Documentation Reviewed and Agree: Yes


Does patient meet sepsis criteria?: No


System Inflammatory Response Syndrome: Not Applicable


Sepsis Protocol: 


For patient's 13 years and over:





Temp is 96.8 and below  and greater


Pulse >90 BPM


Resp >20/minute


Acutely Altered Mental Status





Are patient's symptoms suggestive of a new infection, such as:


   -Pneumonia


   -Skin, Soft Tissue


   -Endocarditis


   -UTI


   -Bone, Joint Infection


   -Implantable Device


   -Acute Abdominal Infection


   -Wound Infection


   -Meningitis


   -Blood Stream Catheter Infection


   -Unknown








Cardiovascular Complaint Exam





- Chest Pain Complaint/Exam


Onset: Gradual


Duration: today mostly pain is upper back


Symptoms Are: Still present


Timing: Constant


Initial Severity: Mild


Current Severity: Mild


Location: Reports: Discrete


Pain Radiates: Reports: Back


Character: Reports: Aching


Aggravating: Reports: Exertion


Alleviating: Reports: Rest, Upright position, Spontaneous resolution


Associated Signs and Symptoms: Reports: Nausea, Back pain.  Denies: Diaphoresis

, Vomiting, Fever, Palpitations, Cough, Hemoptysis, Abdominal pain, Dizziness, 

Short of air, Calf pain, Calf swelling


Related History: Reports: Similar episode


Related Surgical History: Reports: None


History of Healthcare-Acquired Pneumonia: Reports: No


AMI/ACS Risk Factors: Reports: Sedentary, Diabetes (low EF), Hypertension


TAD Risk Factors: Reports: Hypertension


Pulmonary Embolism Risk Factors: Reports: Bedrest


Prior Care for this Complaint: Yes (CAD)


Recent Stress Test: No


Recent Echo/LV Function: No


JVD Present: No


Subcutaneous Emphysema Present: No


Diminshed Breath Sounds: No


Reproducible Chest Wall Pain: No


Bilateral Pulses Present: No


Unequal Pulses Noted: No


If Risk Factors for AMI/ACS Consider: EKG


Differential Diagnoses: ACS, Stable Angina, GI Diseasae, Lower Resp. Infection


Quality Indicators For Acute MI or Cardiac Chest Pain: EKG in 10min.


Quality Indicator For Non-Traumatic Chest Pain/Syncope: EKG Performed





Review of Systems





- Review Of Systems


Constitutional: Reports: No symptoms


Eyes: Reports: No symptoms


Ears, Nose, Mouth, Throat: Reports: No symptoms


Respiratory: Reports: No symptoms


Cardiac: Reports: Chest pain


GI: Reports: No symptoms


: Reports: No symptoms


Musculoskeletal: Reports: No symptoms


Skin: Reports: No symptoms


Neurological: Reports: No symptoms


Endocrine: Reports: No symptoms


Hematologic/Lymphatic: Reports: No symptoms


All Other Systems: Reviewed and Negative





Past Medical History





- Past Medical History


Previously Healthy: Yes


Endocrine: Reports: None


Cardiovascular: Reports: Hypertension


Respiratory: Reports: None


Hematological: Reports: Anemia


Gastrointestinal: Reports: GERD


Genitourinary: Reports: None


Neuro/Psych: Reports: TIA


Musculoskeletal: Reports: Arthritis


Cancer: Reports: None


Last Menstrual Period: n/a





- Surgical History


General Surgical History: Reports: Pacemaker (defibrilltor. )





- Family History


Family History: Reports: Heart





- Social History


Smoking Status: Former smoker


Hx Substance Use: No


Alcohol Screening: None





Physical Exam





- Physical Exam


Appearance: Well-appearing, No pain distress, Well-nourished


Eyes: KAYLA, EOMI, Conjunctiva clear


ENT: Ears normal, Nose normal, Oropharynx normal


Respiratory: Airway patent, Breath sounds clear, Breath sounds equal, 

Respirations nonlabored


Cardiovascular: RRR, Pulses normal, No rub, No murmur


GI/: Soft, Nontender, No masses, Bowel sounds normal, No Organomegaly


Musculoskeletal: Normal strength, ROM intact, No edema, No calf tenderness


Skin: Warm, Dry, Normal color


Neurological: Sensation intact, Motor intact, Reflexes intact, Cranial nerves 

intact, Alert, Oriented


Psychiatric: Affect appropriate, Mood appropriate





Interpretation





- Cardiac Monitor


Rate: Normal


Rhythm: Sinus





- EKG Interpretation


Rate: Normal


Rhythm: Sinus


Ectopy: PVCs (MULTIFOCAL)


Axis: Left





Physician Notification





- Case Discussed


Physician Notified: PMD


Time of Notification: 18:28 (TRANSFER )


Physician Notified: TANISHA 


Time of Notification: 18:29 (TRANSFER )





Critical Care Note





- Critical Care Note


Total Time (mins): 0





Course





- Course


Hematology/Chemistry: 


 09/07/18 17:30





 09/07/18 17:30


Orders, Labs, Meds: 


Lab Review











  09/07/18 09/07/18





  17:30 17:30


 


WBC  5.54 


 


RBC  3.92 L 


 


Hgb  10.6 L 


 


Hct  31.6 L 


 


MCV  80.6 L 


 


MCH  27.0 


 


MCHC  33.5 


 


RDW Coeff of Nori  19.0 H 


 


Plt Count  233 


 


Immature Gran % (Auto)  0.4 


 


Neut % (Auto)  82.3 


 


Lymph % (Auto)  8.3 L 


 


Mono % (Auto)  8.3 


 


Eos % (Auto)  0.2 


 


Baso % (Auto)  0.5 


 


Immature Gran # (Auto)  0.0 


 


Neut # (Auto)  4.6 


 


Lymph # (Auto)  0.5 L 


 


Mono # (Auto)  0.5 


 


Eos # (Auto)  0.0 


 


Baso # (Auto)  0.0 


 


Sodium   135 L


 


Potassium   3.2 L


 


Chloride   98


 


Carbon Dioxide   26


 


Anion Gap   14.2


 


BUN   31 H


 


Creatinine   1.42 H


 


Estimated GFR (MDRD)   44.00


 


BUN/Creatinine Ratio   21.83


 


Glucose   111 H


 


Calcium   9.0


 


Total Bilirubin   2.1 H


 


AST   32


 


ALT   20


 


Alkaline Phosphatase   123


 


Total Creatine Kinase   54


 


Troponin I   0.047 H


 


Total Protein   7.6


 


Albumin   4.1


 


Globulin   3.5


 


Albumin/Globulin Ratio   1.17


 


Amylase   42


 


Lipase   51








Orders











 Category Date Time Status


 


 EKG-(ED ONLY) Stat CARDIO  09/07/18 17:23 Completed


 


 EKG-(ED ONLY) Stat CARDIO  09/07/18 18:17 Ordered


 


 NPO REMINDER: IMAGING ONCE CARE  09/07/18 17:44 Active


 


 AMYLASE Stat LAB  09/07/18 17:30 Completed


 


 CBC W/ AUTO DIFF Stat LAB  09/07/18 17:30 Completed


 


 COMPREHENSIVE METABOLIC PANEL Stat LAB  09/07/18 17:30 Completed


 


 CREATINE KINASE Stat LAB  09/07/18 17:30 Completed


 


 LIPASE Stat LAB  09/07/18 17:30 Completed


 


 TROPONIN I Stat LAB  09/07/18 17:30 Completed


 


 URINALYSIS C & S IF INDICATED Stat LAB  09/07/18 17:23 Uncollected


 


 CT ABDOMEN/PELVIS WO CONTRAST Stat RADS  09/07/18 17:23 Taken


 


 CT CHEST W/O CONTRAST Stat RADS  09/07/18 17:42 Taken


 


 CTA ANGIO CHEST Stat RADS  09/07/18 17:44 Ordered











Vital Signs: 


 











  Temp Pulse Resp BP Pulse Ox


 


 09/07/18 16:22  97.6 F  83  16  100/67  94 L














GENNY Risk Score


GENNY Risk Score: 


Risk Score      Odds of death by 30D


      0                 0.1 (0.1-0.2)


      1                 0.3 (0.2-0.3)


      2                 0.4 (0.3-0.5)


      3                 0.7 (0.6-0.9)


      4                 1.2 (1.0-1.5)


      5                 2.2 (1.9-2.6)


      6                 3.0 (2.5-3.6)


      7                 4.8 (3.8-6.1)








Departure





- Departure


Time of Disposition: 18:32 (NO ACUTE EVENT WHILE IN THE ED  SPOKE TO PMD AND 

TANISHA FLORES AND TRANSFERED THE PT )


Disposition: TSF SHORT-TRM HOSP


Discharge Problem: 


 Nausea





Chest pain


Qualifiers:


 Chest pain type: unspecified Qualified Code(s): R07.9 - Chest pain, unspecified





Instructions:  Angina (ED)


Condition: Good


Pt referred to PMD for follow-up: Yes (TANISHA )


IPMP verified?: No


Allergies/Adverse Reactions: 


Allergies





aspirin Adverse Reaction (Verified 09/07/18 17:12)


 


cholecalciferol (vitamin D3) [From Vitamin D3] Adverse Reaction (Verified 09/07/ 18 17:12)


 


ciprofloxacin Adverse Reaction (Verified 09/07/18 17:12)


 


codeine Adverse Reaction (Verified 09/07/18 17:12)


 


dexamethasone [From Decadron] Adverse Reaction (Verified 09/07/18 17:12)


 


dexamethasone sod phosphate [From Decadron] Adverse Reaction (Verified 09/07/18 

17:12)


 


ergocalciferol (vitamin D2) [From Vitamin D2] Adverse Reaction (Verified 09/07/ 18 17:12)


 constipation


fluticasone propionate [From Advair Diskus] Adverse Reaction (Verified 09/07/18 

17:12)


 


hydrochlorothiazide Adverse Reaction (Verified 09/07/18 17:12)


 angioedema


salmeterol xinafoate [From Advair Diskus] Adverse Reaction (Verified 09/07/18 17

:12)


 


spironolactone Adverse Reaction (Verified 09/07/18 17:12)


 


Sulfa (Sulfonamide Antibiotics) Adverse Reaction (Verified 09/07/18 17:12)


 








Home Medications: 


Ambulatory Orders





Clopidogrel Bisulfate [Plavix] 75 mg PO DAILY 09/01/15 


Furosemide [Lasix] 20 mg PO BID 09/01/15 


Omega-3 Fatty Acids/Fish Oil [Fish Oil 1,000 mg Capsule] 1 each PO DAILY 09/01/

15 


Pravastatin Sodium [Pravachol] 20 mg PO DAILY 09/01/15 


Metolazone 2.5 mg PO DAILY PRN 10/11/16 


Prednisone 2.5 mg PO EVERY OTHER DAY 10/11/16 


Albuterol Sulfate [Proair Hfa] 2 puff INH Q4-6H PRN 01/17/17 


L.acidoph,Paracasei, B.lactis [Probiotic] 1 cap PO DAILY 01/17/17 


Furosemide [Lasix] 10 mg PO MOWEFR 01/18/17 


Potassium Chloride [K-Tab ER] 20 meq PO DAILY #1 tablet.er 01/18/17 


Sotalol HCl [Betapace] 40 mg PO BID 01/18/17 


Allopurinol 100 mg PO DAILY 04/23/18

## 2018-09-07 NOTE — CT
EXAM: Noncontrast CT of the abdomen and pelvis 

  

HISTORY: Pain 

  

COMPARISON: 04/23/2018 

  

TECHNIQUE: Axial noncontrast CT of the abdomen pelvis with sagittal and coronal reformats. 

  

FINDINGS: 

The heart is enlarged.  There is bibasilar interlobular septal thickening. 

  

Noncontrast technique limits evaluation of abdominal viscera. Hepatic and splenic calcified granuloma
s are noted.  The gallbladder has been removed.  The unenhanced adrenals and right kidney appear unre
markable.  There is a small left renal nonobstructing calculus.  The pancreas is mildly atrophic. 

  

No abnormal small bowel dilation is seen.  Diverticulosis is seen without evidence of diverticulitis.
 No free air is seen.  There is mild free pelvic fluid.  Calcified atherosclerotic plaque of the aort
a and some of its branches is seen. There is bilateral L5 spondylolysis with unchanged grade 1 poster
ior listhesis at L4-5 and grade 1 anterolisthesis at L5-S1.  There is also mild grade 1 anterolisthes
is at L2-3 and L3-4.  Multilevel degenerative disc disease and facet arthropathy is again seen. There
 is a tiny fat containing umbilical hernia. 

  

IMPRESSION: 

Mild nonspecific free pelvic fluid. 

  

No other evidence of an acute intra-abdominal process. 

  

Diverticulosis. 

  

Left renal nonobstructing calculus. 

  

Other chronic findings as described above. 

  

Noncontrast exam.

## 2018-09-07 NOTE — CT
Exam:  CT scan of the thorax without contrast. 

  

Date:  09/07/2018. 

  

Comparison:  None. 

  

HISTORY:  Vomiting and coughing. Pain between the shoulder blades. 

  

TECHNIQUE:  Helical scan of the thorax was performed without contrast. 

  

FINDINGS:  A ventricular pacemaker is implanted in the soft tissues over the left thorax.  The axilla
ry regions are normal.  There are subcentimeter mediastinal lymph nodes with granulomatous calcificat
ions.  The caliber of the thoracic aorta is normal.  There is borderline four-chamber cardiac enlarge
ment with a small pericardial effusion.  Granulomatous calcifications are present in the spleen.  The
 spleen and upper liver have a uniform attenuation.  A cholecystectomy is noted. 

  

Minor multilevel degenerative changes are present in the thoracic spine. No acute osseous abnormaliti
es are observed. 

  

Evaluation at lung window settings demonstrates a 0.8 cm noncalcified nodule in the posterior segment
 right upper lobe on image 27.  The 0.3 cm noncalcified nodules also present in the posterior basilar
 segment right lower lobe on image 43.  No pleural fluid or dense consolidation is present.  The trac
heobronchial tree is patent. 

  

Impression:  No acute intrathoracic findings. 

  

There is a 0.8 cm noncalcified nodule in the posterior segment right upper lobe.  A follow-up chest C
T in 3 months would be recommended to document stability. 

  

Cardiomegaly with a small pericardial effusion.  Echocardiography would be suggested if it has not be
en previously performed. 

  

Ventricular pacemaker. 

  

Old granulomatous disease. 

  

Cholecystectomy.

## 2018-09-10 PROBLEM — N17.9 AKI (ACUTE KIDNEY INJURY) (HCC): Status: ACTIVE | Noted: 2018-01-01

## 2018-09-10 NOTE — TELEPHONE ENCOUNTER
"Daughter states she does not know which emergency room to take her mother to. States when she goes to [place] they transfer her due to her cardiac enzymes. She wants to know if can just come to [place]? Told her yes.     Reason for Disposition  • [1] SEVERE vomiting (e.g., 6 or more times/day) AND [2] present > 8 hours    Additional Information  • Negative: Shock suspected (e.g., cold/pale/clammy skin, too weak to stand, low BP, rapid pulse)  • Negative: Difficult to awaken or acting confused (e.g., disoriented, slurred speech)  • Negative: Sounds like a life-threatening emergency to the triager  • Negative: Vomiting occurs only while coughing  • Negative: [1] Pregnant < 20 Weeks AND [2] nausea/vomiting began in early pregnancy (i.e., 4-8 weeks pregnant)  • Negative: Chest pain  • Negative: Headache is main symptom  • Negative: Vomiting (or Nausea) in a cancer patient who is currently (or recently) receiving chemotherapy or radiation therapy, or cancer patient who has metastatic or end-stage cancer and is receiving palliative care  • Negative: [1] Vomiting AND [2] contains red blood or black (\"coffee ground\") material  (Exception: few red streaks in vomit that only happened once)  • Negative: Severe pain in one eye  • Negative: Recent head injury (within last 3 days)  • Negative: Recent abdominal injury (within last 3 days)  • Negative: [1] Insulin-dependent diabetes (Type I) AND [2] glucose > 400 mg/dl (22 mmol/l)    Answer Assessment - Initial Assessment Questions  1. VOMITING SEVERITY: \"How many times have you vomited in the past 24 hours?\"      - MILD:  1 - 2 times/day     - MODERATE: 3 - 5 times/day, decreased oral intake without significant weight loss or symptoms of dehydration     - SEVERE: 6 or more times/day, vomits everything or nearly everything, with significant weight loss, symptoms of dehydration       Vomiting 6 or more times per day despite use of zofran every 8 hours  2. ONSET: \"When did the " "vomiting begin?\"       9/7/18  3. FLUIDS: \"What fluids or food have you vomited up today?\" \"Have you been able to keep any fluids down?\"      She is drinking but not keeping fluids down. In past 2 days reports voided \"1 cup\"  4. ABDOMINAL PAIN: \"Are your having any abdominal pain?\" If yes : \"How bad is it and what does it feel like?\" (e.g., crampy, dull, intermittent, constant)       No  5. DIARRHEA: \"Is there any diarrhea?\" If so, ask: \"How many times today?\"       No diarrhea  6. CONTACTS: \"Is there anyone else in the family with the same symptoms?\"       No  7. CAUSE: \"What do you think is causing your vomiting?\"      Patient has large amount of edema, even to her eyes. Weight gain 9 lbs in 3 days.   8. HYDRATION STATUS: \"Any signs of dehydration?\" (e.g., dry mouth [not only dry lips], too weak to stand) \"When did you last urinate?\"      Pt has voided very little in 2 days, daughter states volume is 1 cup  9. OTHER SYMPTOMS: \"Do you have any other symptoms?\" (e.g., fever, headache, vertigo, vomiting blood or coffee grounds, recent head injury)      UTI, elevated troponin recently  10. PREGNANCY: \"Is there any chance you are pregnant?\" \"When was your last menstrual period?\"        NA    Protocols used: VOMITING-ADULT-AH      "

## 2018-09-12 PROBLEM — I50.43 ACUTE ON CHRONIC SYSTOLIC AND DIASTOLIC HEART FAILURE, NYHA CLASS 3 (HCC): Status: ACTIVE | Noted: 2018-01-01

## 2018-09-12 PROBLEM — I25.10 CORONARY ARTERY DISEASE INVOLVING NATIVE CORONARY ARTERY OF NATIVE HEART WITHOUT ANGINA PECTORIS: Status: ACTIVE | Noted: 2018-01-01

## 2018-09-12 PROBLEM — R11.2 NAUSEA AND VOMITING: Status: ACTIVE | Noted: 2018-01-01

## 2018-09-14 NOTE — ANESTHESIA PREPROCEDURE EVALUATION
Anesthesia Evaluation     Patient summary reviewed   no history of anesthetic complications:  NPO Solid Status: > 8 hours             Airway   Mallampati: II  TM distance: >3 FB  Neck ROM: full  Dental    (+) lower dentures and upper dentures    Pulmonary    (-) asthma, sleep apnea, not a smoker  Cardiovascular   Exercise tolerance: poor (<4 METS)    ECG reviewed    (+) pacemaker ([employer]) ICD, hypertension, CAD, CHF (ef 20%), pericardial effusion,       Neuro/Psych  (-) seizures, CVA  GI/Hepatic/Renal/Endo    (+) obesity,   renal disease ARF and CRI,   (-) liver disease, diabetes    Musculoskeletal     Abdominal    Substance History      OB/GYN          Other                        Anesthesia Plan    ASA 4     MAC     intravenous induction   Anesthetic plan, all risks, benefits, and alternatives have been provided, discussed and informed consent has been obtained with: patient.

## 2018-09-14 NOTE — ANESTHESIA POSTPROCEDURE EVALUATION
"Patient: Evelin Leach    Procedure Summary     Date:  09/14/18 Room / Location:  [facility] OR  /  PAD HYBRID OR 12    Anesthesia Start:  0856 Anesthesia Stop:  0944    Procedure:  HEMODIALYSIS CATHETER INSERTION (Right Neck) Diagnosis:       BEE (acute kidney injury) (CMS/HCC)      (BEE (acute kidney injury) (CMS/HCC) [N17.9])    Surgeon:  Dimitri Jacobo MD Provider:  Wilber Bryant CRNA    Anesthesia Type:  MAC ASA Status:  4          Anesthesia Type: MAC  Last vitals  BP   107/61 (09/14/18 1232)   Temp   98.2 °F (36.8 °C) (09/14/18 1232)   Pulse   78 (09/14/18 1232)   Resp   16 (09/14/18 1232)     SpO2   98 % (09/14/18 1300)     Post Anesthesia Care and Evaluation    PONV Status: none  Comments: Patient d/c from PACU prior to anes eval based on Win score.  Please see RN notes for details of d/c criteria.    Blood pressure 107/61, pulse 78, temperature 98.2 °F (36.8 °C), temperature source Oral, resp. rate 16, height 160 cm (63\"), weight 88.2 kg (194 lb 6.4 oz), SpO2 98 %.          "

## 2018-09-22 NOTE — OUTREACH NOTE
Prep Survey      Responses   Facility patient discharged from?  [facility]   Is patient eligible?  Yes   Discharge diagnosis  BEE   Does the patient have one of the following disease processes/diagnoses(primary or secondary)?  Other   Does the patient have Home health ordered?  Yes   Is there a DME ordered?  No   Comments regarding appointments  office to call   Prep survey completed?  Yes          Cynthia Pastrana RN

## 2018-09-25 NOTE — OUTREACH NOTE
Medical Week 1 Survey      Responses   Facility patient discharged from?  [facility]   Does the patient have one of the following disease processes/diagnoses(primary or secondary)?  Other   Is there a successful TCM telephone encounter documented?  No   Week 1 attempt successful?  Yes   Call start time  1506   Call end time  1514   Is patient permission given to speak with other caregiver?  Yes   List who call center can speak with  Anyone that answers.    Meds reviewed with patient/caregiver?  Yes   Is the patient having any side effects they believe may be caused by any medication additions or changes?  No   Does the patient have all medications ordered at discharge?  Yes   Is the patient taking all medications as directed (includes completed medication regime)?  Yes   Does the patient have a primary care provider?   Yes   Does the patient have an appointment with their PCP within 7 days of discharge?  Yes   Comments regarding PCP  Michael Rojas    Has the patient kept scheduled appointments due by today?  Yes   What is the Home health agency?   [facility]    Has home health visited the patient within 72 hours of discharge?  Yes   Has all DME been delivered?  Yes   Psychosocial issues?  No   Did the patient receive a copy of their discharge instructions?  Yes   Nursing interventions  Reviewed instructions with patient   What is the patient's perception of their health status since discharge?  Improving   Is the patient/caregiver able to teach back signs and symptoms related to disease process for when to call PCP?  Yes   Is the patient/caregiver able to teach back signs and symptoms related to disease process for when to call 911?  Yes   Is the patient/caregiver able to teach back the hierarchy of who to call/visit for symptoms/problems? PCP, Specialist, Home health nurse, Urgent Care, ED, 911  Yes   Week 1 call completed?  Yes          Radha Vigil RN

## 2018-10-02 NOTE — OUTREACH NOTE
Medical Week 2 Survey      Responses   Facility patient discharged from?  [place]   Does the patient have one of the following disease processes/diagnoses(primary or secondary)?  Other   Week 2 attempt successful?  No   Unsuccessful attempts  Attempt 1          Vianey Ro RN

## 2018-10-03 NOTE — OUTREACH NOTE
Medical Week 2 Survey      Responses   Facility patient discharged from?  [place]   Does the patient have one of the following disease processes/diagnoses(primary or secondary)?  Other   Week 2 attempt successful?  Yes   Call start time  1018   Rescheduled  Rescheduled-pt requested   Call end time  1019          Evelyn Wang, RN

## 2018-10-05 NOTE — OUTREACH NOTE
Medical Week 2 Survey      Responses   Facility patient discharged from?  [facility]   Does the patient have one of the following disease processes/diagnoses(primary or secondary)?  Other   Week 2 attempt successful?  Yes   Call start time  1142   Discharge diagnosis  BEE   Call end time  1150   Is patient permission given to speak with other caregiver?  Yes   List who call center can speak with  Anyone that answers.    Meds reviewed with patient/caregiver?  Yes   Is the patient having any side effects they believe may be caused by any medication additions or changes?  No   Does the patient have all medications ordered at discharge?  Yes   Is the patient taking all medications as directed (includes completed medication regime)?  Yes   Does the patient have a primary care provider?   Yes   Does the patient have an appointment with their PCP within 7 days of discharge?  Yes   Comments regarding PCP  Michael Rojas    Has the patient kept scheduled appointments due by today?  Yes   Home health comments  has help at home to help clean   Psychosocial issues?  No   Did the patient receive a copy of their discharge instructions?  Yes   Nursing interventions  Reviewed instructions with patient   What is the patient's perception of their health status since discharge?  New symptoms unrelated to diagnosis [shortness of breath with exertion,  instructed to call PCP, states it is improved after dialysis]   Is the patient/caregiver able to teach back signs and symptoms related to disease process for when to call PCP?  Yes   Is the patient/caregiver able to teach back signs and symptoms related to disease process for when to call 911?  Yes   Is the patient/caregiver able to teach back the hierarchy of who to call/visit for symptoms/problems? PCP, Specialist, Home health nurse, Urgent Care, ED, 911  Yes   Week 2 Call Completed?  Yes          Yeni Pryor RN

## 2018-10-08 ENCOUNTER — HOSPITAL ENCOUNTER (OUTPATIENT)
Dept: HOSPITAL 58 - CAR | Age: 73
Discharge: HOME | End: 2018-10-08
Attending: INTERNAL MEDICINE

## 2018-10-08 VITALS — BODY MASS INDEX: 29.8 KG/M2

## 2018-10-08 DIAGNOSIS — R06.02: Primary | ICD-10-CM

## 2018-10-08 PROCEDURE — 94761 N-INVAS EAR/PLS OXIMETRY MLT: CPT

## 2018-10-12 NOTE — OUTREACH NOTE
Medical Week 3 Survey      Responses   Facility patient discharged from?  [place]   Does the patient have one of the following disease processes/diagnoses(primary or secondary)?  Other   Week 3 attempt successful?  Yes   Call start time  1513   Call end time  1516   Discharge diagnosis  BEE   Is patient permission given to speak with other caregiver?  Yes   Person spoke with today (if not patient) and relationship  Juliann-daughter   Is the patient taking all medications as directed (includes completed medication regime)?  Yes   Does the patient have a primary care provider?   Yes   Has the patient kept scheduled appointments due by today?  Yes   What is the Home health agency?   [facility]    What DME was ordered?  Is now on O2   Psychosocial issues?  No   What is the patient's perception of their health status since discharge?  Improving   Week 3 Call Completed?  Yes          Josefa Grubbs RN

## 2018-10-15 NOTE — PROGRESS NOTES
Subjective:     Encounter Date:10/16/2018      Patient ID: Evelin Leach is a 73 y.o. female.    Chief Complaint:  CHF f/u  73-year-old female previously followed by Dr. Rojas in [place] for nonischemic dilated cardiomyopathy, status post ICD implantation in [place], whom I met during a hospitalization from 4/23/18-5/4/18.  It was initially thought that she had acute cholecystitis, and I was asked to see her for preoperative consultation.  After reviewing her history and gathering medical records, it became clear that she had significant left ventricular systolic dysfunction and the treatment for presumed acute cholecystitis with intravenous fluids caused her heart failure to decompensate, requiring admission to the CCU.  She did have a [place] catheter placed and briefly required inotropic therapy.  She improved and was initiated on afterload reduction therapy with Isordil and hydralazine, both slowly and safely titrated upward to goal doses as established by the A-HeFT trial.  She was discharged on May 4 with prescriptions for Isordil 40 mg 3 times a day and hydralazine 75 mg 3 times a day.  Also, she had previously been treated with sotalol but had very frequent ventricular ectopy and NSVT; ultimately the sotalol was discontinued during the aformentioned hospital admission, and she was started on amiodarone with good response.  She was not requiring any diuretics at the time of discharge.  She saw Dr Rojas 5/11 for worsened leg swelling and was rx'ed 20mg daily and was told to reduce isordil from 40mg tid to 20mg tid.  She reported no noticeable response to lasix 20mg when I saw her here in my office on 5/15/18 for follow-up, at which time she also reported not having any orthopnea nor worsening of her respiratory status.  At that visit, I did switch her back to trial-directed goal dose of long-acting nitrate, at 120 mg of Imdur daily.      She was last seen here on 6/19/18, at which point I attempted  to start Toprol XL 25mg daily but she ended up being admitted 6/26-6/28/18 for nausea. It was unclear whether symptoms were due to BB or from gallstones.  She was discharged home and had been doing well, with her goal/ideal weight being ~173-174#. At times she has fluctuated up to 180-183  (accompanied by worsened leg swelling, orthopnea, and SINHG).  Additional Lasix doses had been working, but recently her weight around 183#, but has not been responding to extra lasix. Associated with mild worsening of dyspnea, orthopnea, and leg swelling.  Associated with increased cough as well. Has been taking up to Lasix 40mg TID, without improvement.     She was then admitted from 9/10 until 9/21/18, with symptoms of volume overload and progressive renal failure.  She ultimately had a tunneled catheter placed was initiated on dialysis.  She cannot tolerate her outpatient heart failure medications due to hypotension during this admission, but ultimately was restarted on lowest dose hydralazine prior to discharge.  Since then, she has continued receiving dialysis 3 days a week, but has not been able to get hydralazine at all due to blood pressure.  Dialysis she has lost significant amount of fluid weight since initiating dialysis, and as a result does feel somewhat improved from a respiratory standpoint she has no other associated chest pain or palpitations.          The following portions of the patient's history were reviewed and updated as appropriate: allergies, current medications, past family history, past medical history, past social history, past surgical history and problem list.    Review of Systems   Constitution: Positive for weakness. Negative for malaise/fatigue.   Cardiovascular: Positive for dyspnea on exertion and leg swelling. Negative for chest pain, claudication, near-syncope, orthopnea, palpitations, paroxysmal nocturnal dyspnea and syncope.   Respiratory: Negative for shortness of breath.     Hematologic/Lymphatic: Does not bruise/bleed easily.        Objective:      Vitals:    10/16/18 1422   BP: (!) 84/60   Pulse: 79     Physical Exam   Constitutional: She is oriented to person, place, and time. She appears well-developed and well-nourished.   Neck: No JVD present.   Cardiovascular: Normal rate, regular rhythm, normal heart sounds and intact distal pulses.   Occasional extrasystoles are present.   No murmur heard.  Pulmonary/Chest: Effort normal and breath sounds normal.   Musculoskeletal: She exhibits edema (trace BLE edema).   Neurological: She is alert and oriented to person, place, and time.   Skin: Skin is warm and dry.       Lab Review:         ECG 12 Lead  Date/Time: 10/16/2018 10:38 PM  Performed by: MARCOS LONGO  Authorized by: MARCOS LONGO   Comparison: compared with previous ECG from 9/7/2018  Rhythm: sinus rhythm  Ectopy: frequent PVCs  BPM: 79  Conduction: non-specific intraventricular conduction delay  Clinical impression: abnormal ECG          Results for orders placed during the hospital encounter of 04/23/18   Adult Transthoracic Echo Complete W/ Cont if Necessary Per Protocol    Narrative · Severe dilated cardiomyopathy (LVEF <20% with severe global   hypokinesis).  · Left ventricular diastolic dysfunction is noted (grade II w/high LAP)   consistent with pseudonormalization.  · Mild aortic valve stenosis is present.  · The left ventricular cavity is severely dilated.  · Mild mitral valve regurgitation is present  · Left atrial cavity size is severely dilated.  · Estimated right ventricular systolic pressure from tricuspid   regurgitation is moderately elevated (45-55 mmHg).  · There is a small (<1cm) circumferential pericardial effusion.          Assessment/Plan:       Problem List Items Addressed This Visit        Cardiovascular and Mediastinum    Essential hypertension (Chronic)    Current Assessment & Plan     Blood pressure now low, not allowing for any CHF medications.          Dilated cardiomyopathy (CMS/HCC) - Primary (Chronic)    Overview     EF <20% on echo 4/23/18         Current Assessment & Plan     Non-ischemic dilated cardiomyopathy, NYHA 3.  Unable to tolerate any medical therapies due to symptomatic hypotension.  Volume status being better managed now with dialysis.  Continue close assessment, and I did advise the patient and her family that, should her blood pressure start to rise a little bit such that she could tolerate reintroduction of any of her previous heart failure medications, to please contact me we would do so.  Otherwise, will reassess in 6 months.         Ventricular tachycardia (CMS/HCC)    Current Assessment & Plan     No recent recurrences; did renew prescription for amiodarone today.             F/u 6 months, unless symptoms worsen in meantime    Dayton Baker MD  10/16/2018  10:41 PM

## 2018-10-24 NOTE — PATIENT INSTRUCTIONS

## 2018-10-24 NOTE — PROGRESS NOTES
10/24/2018      No referring provider defined for this encounter.    Evelin Leach  1945    Chief Complaint   Patient presents with   • Post-op     1 month permacath placement.  Pt states that she is doing well       Dear No ref. provider found:      HPI  I had the pleasure of seeing your patient Evelin Leach in the office today.  Thank you kindly for this consultation.  As you recall, Evelin Leach is a 73 y.o.  female who you are currently following for acute on chronic renal failure.  She was admitted to [facility] just over one month ago and required the initiation of hemodialysis.  As such I placed a right internal jugular vein permacath on 9/14.  She tolerated the procedure well and has been tolerating dialysis since then.  She reports she undergoes dialysis and [facility] on Mondays, Wednesdays, and Fridays.  She presents today for postprocedure follow-up.  As of now she is unsure of dialysis will be permanent or if there is a chance of her not needing further dialysis.  She otherwise reports feeling well and has had no issues with the catheter..    Past Medical History:   Diagnosis Date   • Arthritis    • Back pain    • Cataract     left eye   • CHF (congestive heart failure) (CMS/HCC)    • Enlarged heart    • Gallstones    • GERD (gastroesophageal reflux disease)    • Hyperlipidemia    • Hypertension    • Myocardial infarction (CMS/HCC)    • PONV (postoperative nausea and vomiting)    • Stroke (CMS/HCC)    • TIA (transient ischemic attack)        Past Surgical History:   Procedure Laterality Date   • CARDIAC CATHETERIZATION     • CARDIAC CATHETERIZATION N/A 4/26/2018    Procedure: Right Heart Cath, possible leave-in SG. Case at 130 please;  Surgeon: Dayton Baker MD;  Location: [facility] CATH INVASIVE LOCATION;  Service: Cardiovascular   • CARDIAC PACEMAKER PLACEMENT     • CARDIAC PACEMAKER PLACEMENT      [employer] scientific yrs ago   • CHOLECYSTECTOMY WITH INTRAOPERATIVE CHOLANGIOGRAM N/A 7/24/2018     Procedure: CHOLECYSTECTOMY LAPAROSCOPIC;  Surgeon: Joanna Munoz MD;  Location:  [facility] OR;  Service: General   • INSERTION HEMODIALYSIS CATHETER Right 9/14/2018    Procedure: HEMODIALYSIS CATHETER INSERTION;  Surgeon: Dimitri Jacobo MD;  Location:  [facility] HYBRID OR 12;  Service: Vascular       Family History   Problem Relation Age of Onset   • Heart failure Mother    • Cancer Father        Social History     Social History   • Marital status: Legally      Spouse name: N/A   • Number of children: N/A   • Years of education: N/A     Occupational History   • Not on file.     Social History Main Topics   • Smoking status: Former Smoker   • Smokeless tobacco: Never Used   • Alcohol use No   • Drug use: No   • Sexual activity: Defer     Other Topics Concern   • Not on file     Social History Narrative   • No narrative on file       Allergies   Allergen Reactions   • Aspirin Nausea And Vomiting     Had history of  Ulcers in past   • Ciprofloxacin Hcl Rash   • Sulfa Antibiotics Rash   • Advair Diskus [Fluticasone-Salmeterol] Unknown (See Comments)     Unknown   • Ergocalciferol Unknown (See Comments)     Unknown   • Hydrochlorothiazide Unknown (See Comments)     Unknown   • Spironolactone Unknown (See Comments)     Unknown   • Vitamin D Analogs Unknown (See Comments)     Unknown       Prior to Admission medications    Medication Sig Start Date End Date Taking? Authorizing Provider   albuterol (PROVENTIL HFA;VENTOLIN HFA) 108 (90 Base) MCG/ACT inhaler Inhale 2 puffs Every 4 (Four) Hours As Needed for Wheezing or Shortness of Air.   Yes Sam Gibson MD   albuterol (PROVENTIL) (2.5 MG/3ML) 0.083% nebulizer solution Take 2.5 mg by nebulization Every 4 (Four) Hours As Needed for Wheezing.   Yes Sam Gibson MD   amiodarone (PACERONE) 200 MG tablet Take 1 tablet by mouth Daily. 10/16/18  Yes Dayton Baker MD   AMOXICILLIN PO Take  by mouth 3 (Three) Times a Day.   Yes Sam Gibson  "MD   famotidine (PEPCID) 20 MG tablet Take 1 tablet by mouth Every Night. 5/4/18  Yes Micha Loving MD   hydrALAZINE (APRESOLINE) 10 MG tablet Take 1 tablet by mouth Every 8 (Eight) Hours. 9/21/18  Yes Brice Davis APRN   HYDROcodone-acetaminophen (NORCO) 7.5-325 MG per tablet Take 1 tablet by mouth Every 4 (Four) Hours As Needed for Moderate Pain . 7/9/18  Yes Idris Carballo Jr., MD   O2 (OXYGEN) Inhale 2 L/min As Needed.   Yes Provider, MD Sam   ondansetron ODT (ZOFRAN-ODT) 4 MG disintegrating tablet Take 1 tablet by mouth Every 8 (Eight) Hours As Needed for Nausea or Vomiting. 9/8/18  Yes Ivory Baker MD   potassium chloride (K-DUR) 10 MEQ CR tablet Take 20 mEq by mouth 2 (Two) Times a Day.   Yes Provider, Historical, MD       Review of Systems   Constitutional: Negative.  Negative for activity change, appetite change and chills.   HENT: Negative.    Eyes: Negative.    Respiratory: Negative.  Negative for chest tightness and shortness of breath.    Cardiovascular: Negative.  Negative for chest pain, palpitations and leg swelling.   Gastrointestinal: Negative.  Negative for abdominal pain.   Endocrine: Negative.    Genitourinary: Negative.    Musculoskeletal: Negative.    Skin: Negative.    Allergic/Immunologic: Negative.    Neurological: Negative.    Hematological: Negative.    Psychiatric/Behavioral: Negative.        /64 (BP Location: Right arm, Patient Position: Sitting, Cuff Size: Adult)   Pulse 85   Ht 160 cm (63\")   Wt 76.7 kg (169 lb)   SpO2 98%   BMI 29.94 kg/m²   Physical Exam   Constitutional: She is oriented to person, place, and time. She appears well-developed and well-nourished.   HENT:   Head: Normocephalic and atraumatic.   Eyes: Pupils are equal, round, and reactive to light. EOM are normal.   Neck: Normal range of motion. Neck supple. No JVD present.   Right internal jugular vein permacath is in place.  The chest wall exit site of the catheter is " clean/dry/intact.  Dressing is in place.  There is no erythema to the surrounding skin.  There is no tenderness.   Cardiovascular: Normal rate and regular rhythm.    Pulmonary/Chest: Effort normal.   Abdominal: Soft. She exhibits no distension and no mass. There is no tenderness.   Musculoskeletal: Normal range of motion.   Neurological: She is alert and oriented to person, place, and time.   Skin: Skin is warm and dry. Capillary refill takes less than 2 seconds.   Psychiatric: She has a normal mood and affect. Her behavior is normal. Judgment and thought content normal.   Vitals reviewed.      No results found.    Patient Active Problem List   Diagnosis   • Essential hypertension   • Mixed hyperlipidemia   • Dilated cardiomyopathy (CMS/[facility])   • Ventricular tachycardia (CMS/[facility])   • Acute kidney injury (CMS/[facility])   • Gastroesophageal reflux disease without esophagitis   • Acute gout of left foot   • Acute gout of right foot   • Acute UTI (urinary tract infection)   • Cholelithiasis   • Hypokalemia   • Pain   • BEE (acute kidney injury) (CMS/[facility])   • Acute on chronic systolic and diastolic heart failure, NYHA class 3 (CMS/[facility])   • Nausea and vomiting         ICD-10-CM ICD-9-CM   1. CKD (chronic kidney disease) requiring chronic dialysis (CMS/[facility]) N18.6 585.6    Z99.2 V45.11   2. Vascular dialysis catheter in place (CMS/[facility]) Z99.2 V45.11           Plan: After thoroughly evaluating Evelin Leach, I believe the best course of action is to remain conservative from a vascular standpoint.  At this point she continue to use the permacath for hemodialysis as needed as it is functioning well.  I will result to the nephrology team regarding her ultimate long-term dialysis plans.  If she will require indefinite hemodialysis then I will start the process of fistula creation so that she could have long-term durable access.  Otherwise I will see her back in the office in 3 months.  The patient can continue taking their current  medication regimen as previously planned.  This was all discussed in full with complete understanding.    Thank you for allowing me to participate in the care of your patient.  Please do not hesitate with any questions or concerns.  I will keep you aware of any further encounters with Evelin Leach.        Sincerely yours,         Dimitri Jacobo MD

## 2018-11-21 PROBLEM — N28.89 HYPERTENSION SECONDARY TO OTHER RENAL DISORDERS: Status: ACTIVE | Noted: 2018-01-01

## 2018-11-21 PROBLEM — Z99.2 STAGE 5 CHRONIC KIDNEY DISEASE ON CHRONIC DIALYSIS (HCC): Status: ACTIVE | Noted: 2018-01-01

## 2018-11-21 PROBLEM — N18.6 STAGE 5 CHRONIC KIDNEY DISEASE ON CHRONIC DIALYSIS (HCC): Status: ACTIVE | Noted: 2018-01-01

## 2018-11-21 PROBLEM — R55 SYNCOPE: Status: ACTIVE | Noted: 2018-01-01

## 2018-11-21 PROBLEM — N18.30 STAGE 3 CHRONIC KIDNEY DISEASE (HCC): Status: RESOLVED | Noted: 2018-01-01 | Resolved: 2018-01-01

## 2018-11-21 PROBLEM — Z95.810 PRESENCE OF SINGLE CHAMBER AUTOMATIC CARDIOVERTER/DEFIBRILLATOR (AICD): Status: ACTIVE | Noted: 2018-01-01

## 2018-11-21 PROBLEM — N18.30 STAGE 3 CHRONIC KIDNEY DISEASE (HCC): Status: ACTIVE | Noted: 2018-01-01

## 2018-11-21 PROBLEM — I15.1 HYPERTENSION SECONDARY TO OTHER RENAL DISORDERS: Status: ACTIVE | Noted: 2018-01-01

## 2018-11-21 NOTE — TELEPHONE ENCOUNTER
Patient daughter calling in stating that she has had some irregular heart beats and getting very lightheaded today feeling like she is going to pass out.     I advised her to go to the ER.     Patient daughter verbalized understanding.

## 2018-11-23 NOTE — OUTREACH NOTE
Prep Survey      Responses   Facility patient discharged from?  [facility]   Is patient eligible?  Yes   Discharge diagnosis  Syncope, CKD V on chronic dialysis, Acute on chronic systolic and diastolic heart failure, VT, Dilated cardiomyopathy, essential HTN, Mixed HLD, UTI, Hypokalmeia   Does the patient have one of the following disease processes/diagnoses(primary or secondary)?  CHF   Does the patient have Home health ordered?  No   Is there a DME ordered?  No   Comments regarding appointments  Pt to schedule follow up appointments.    Prep survey completed?  Yes          Josefa Bedoya RN

## 2018-11-26 NOTE — OUTREACH NOTE
CHF Week 1 Survey      Responses   Facility patient discharged from?  [facility]   Does the patient have one of the following disease processes/diagnoses(primary or secondary)?  CHF   Is there a successful TCM telephone encounter documented?  No   CHF Week 1 attempt successful?  Yes   Call start time  1701   Call end time  1712   Discharge diagnosis  Syncope, CKD V on chronic dialysis, Acute on chronic systolic and diastolic heart failure, VT, Dilated cardiomyopathy, essential HTN, Mixed HLD, UTI, Hypokalmeia   Is patient permission given to speak with other caregiver?  Yes   List who call center can speak with  Uldimitri, daughter   Person spoke with today (if not patient) and relationship  Uldimitri, Daughter   Meds reviewed with patient/caregiver?  Yes   Is the patient having any side effects they believe may be caused by any medication additions or changes?  No   Does the patient have all medications ordered at discharge?  Yes   Is the patient taking all medications as directed (includes completed medication regime)?  Yes   Does the patient have a primary care provider?   Yes   Does the patient have an appointment with their PCP within 7 days of discharge?  Greater than 7 days   Comments regarding PCP  Michael Rojas PCP. Patient seeing Friday 11/30/18.    What is preventing the patient from scheduling follow up appointments within 7 days of discharge?  Earlier appointment not available   Nursing Interventions  Verified appointment date/time/provider   Has the patient kept scheduled appointments due by today?  N/A   Comments  Cardiology appt with DR Baker 12/5/18 130pm.    Has home health visited the patient within 72 hours of discharge?  N/A   What DME was ordered?  On home O2.    Psychosocial issues?  No   Comments  Daughter states that patients b/p is up and down. They are keeping log to take in to Dr gaby.    Did the patient receive a copy of their discharge instructions?  Yes   Nursing interventions  Reviewed  instructions with patient   What is the patient's perception of their health status since discharge?  Improving   Nursing interventions  Nurse provided patient education   Is the patient weighing daily?  Yes   Does the patient have scales?  Yes   Daily weight interventions  Education provided on importance of daily weight   Is the patient able to teach back Heart Failure diet management?  Yes   Is the patient able to teach back Heart Failure Zones?  Yes   Is the patient able to teach back signs and symptoms of worsening condition? (i.e. weight gain, shortness of air, etc.)  Yes   Is the patient/caregiver able to teach back the hierarchy of who to call/visit for symptoms/problems? PCP, Specialist, Home health nurse, Urgent Care, ED, 911  Yes    CHF Week 1 call completed?  Yes          Josefa Collado RN

## 2018-12-03 NOTE — OUTREACH NOTE
CHF Week 2 Survey      Responses   Facility patient discharged from?  [facility]   Does the patient have one of the following disease processes/diagnoses(primary or secondary)?  CHF   Week 2 attempt successful?  Yes   Call start time  1201   Call end time  1210   Discharge diagnosis  Syncope, CKD V on chronic dialysis, Acute on chronic systolic and diastolic heart failure, VT, Dilated cardiomyopathy, essential HTN, Mixed HLD, UTI, Hypokalmeia   List who call center can speak with  Vern, daughter   Person spoke with today (if not patient) and relationship  Vern, Daughter   Meds reviewed with patient/caregiver?  Yes   Is the patient having any side effects they believe may be caused by any medication additions or changes?  No   Does the patient have all medications ordered at discharge?  Yes   Comments regarding appointments  Was in the ED yesterday on 12/2/2018 for palpitations and has a cardiology appt on 12/05/2018   Does the patient have a primary care provider?   Yes   Does the patient have an appointment with their PCP within 7 days of discharge?  Yes   Comments regarding PCP  Michael Rojas PCP. Seen on 11/30/2018   Has the patient kept scheduled appointments due by today?  Yes   Has home health visited the patient within 72 hours of discharge?  N/A   What DME was ordered?  On home O2.    Psychosocial issues?  No   Comments  Daughter helps her mother with her care. Patient was seen in the ED yesterday for palpitations. She feels better today, no further palpitations,. No SOB, No chest pain.    Did the patient receive a copy of their discharge instructions?  Yes   Nursing interventions  Reviewed instructions with patient   What is the patient's perception of their health status since discharge?  Improving   Nursing interventions  Nurse provided patient education   Is the patient weighing daily?  Yes   Does the patient have scales?  Yes   Daily weight interventions  Education provided on importance of daily  weight   Is the patient able to teach back Heart Failure diet management?  Yes   Is the patient able to teach back Heart Failure Zones?  Yes   Is the patient able to teach back signs and symptoms of worsening condition? (i.e. weight gain, shortness of air, etc.)  Yes   Is the patient/caregiver able to teach back the hierarchy of who to call/visit for symptoms/problems? PCP, Specialist, Home health nurse, Urgent Care, ED, 911  Yes   CHF Week 2 call completed?  Yes          Kam Hoffman RN

## 2018-12-03 NOTE — ED PROVIDER NOTES
Subjective   Patient c/o dizziness today that is worse than her usual that she has had for long time.  Checked her BP and it was okay but her heart would race at times but pulse rate she tells me is in 90s.  Was in hospital recently and had amiodarone increased from one tablet per day to 2 tablets per day.  She is a dialysis patient and had it yesterday.        History provided by:  Patient and relative   used: No    Dizziness   Quality:  Lightheadedness and imbalance  Severity:  Moderate  Onset quality:  Gradual  Duration:  1 day  Timing:  Intermittent  Progression:  Worsening  Chronicity:  Recurrent  Context: not when bending over, not with bowel movement, not with ear pain, not with eye movement, not with head movement, not with inactivity, not with loss of consciousness, not with medication, not with physical activity, not when standing up and not when urinating    Relieved by:  Nothing  Worsened by:  Nothing  Ineffective treatments:  None tried  Associated symptoms: palpitations    Associated symptoms: no blood in stool, no chest pain, no diarrhea, no headaches, no hearing loss, no nausea, no shortness of breath, no syncope, no tinnitus, no vision changes, no vomiting and no weakness    Risk factors: no anemia, no heart disease, no hx of stroke, no hx of vertigo, no Meniere's disease, no multiple medications and no new medications        Review of Systems   Constitutional: Negative.    HENT: Negative.  Negative for hearing loss and tinnitus.    Respiratory: Negative.  Negative for shortness of breath.    Cardiovascular: Positive for palpitations. Negative for chest pain and syncope.   Gastrointestinal: Negative.  Negative for blood in stool, diarrhea, nausea and vomiting.   Genitourinary: Negative.    Musculoskeletal: Negative.    Skin: Negative.    Neurological: Positive for dizziness. Negative for weakness and headaches.   Hematological: Negative.    Psychiatric/Behavioral: Negative.     All other systems reviewed and are negative.      Past Medical History:   Diagnosis Date   • Arthritis    • Back pain    • Cataract     left eye   • CHF (congestive heart failure) (CMS/Formerly [facility])    • Enlarged heart    • Gallstones    • GERD (gastroesophageal reflux disease)    • Hyperlipidemia    • Hypertension    • Kidney failure    • Myocardial infarction (CMS/Formerly [facility])    • PONV (postoperative nausea and vomiting)    • Stage 3 chronic kidney disease (CMS/Formerly [facility]) 11/21/2018   • Stroke (CMS/Formerly [facility])    • TIA (transient ischemic attack)        Allergies   Allergen Reactions   • Aspirin Nausea And Vomiting     Had history of  Ulcers in past   • Ciprofloxacin Hcl Rash   • Sulfa Antibiotics Rash   • Advair Diskus [Fluticasone-Salmeterol] Unknown (See Comments)     Unknown   • Ergocalciferol Unknown (See Comments)     Unknown   • Hydrochlorothiazide Unknown (See Comments)     Unknown   • Spironolactone Unknown (See Comments)     Unknown   • Vitamin D Analogs Unknown (See Comments)     Unknown   • Latex, Natural Rubber Rash       Past Surgical History:   Procedure Laterality Date   • CARDIAC CATHETERIZATION     • CARDIAC PACEMAKER PLACEMENT     • CARDIAC PACEMAKER PLACEMENT      [place] [facility] yrs ago       Family History   Problem Relation Age of Onset   • Heart failure Mother    • Cancer Father        Social History     Socioeconomic History   • Marital status: Legally      Spouse name: Not on file   • Number of children: Not on file   • Years of education: Not on file   • Highest education level: Not on file   Tobacco Use   • Smoking status: Former Smoker   • Smokeless tobacco: Never Used   Substance and Sexual Activity   • Alcohol use: No   • Drug use: No   • Sexual activity: Defer       Prior to Admission medications    Medication Sig Start Date End Date Taking? Authorizing Provider   albuterol (PROVENTIL HFA;VENTOLIN HFA) 108 (90 Base) MCG/ACT inhaler Inhale 2 puffs Every 4 (Four) Hours As Needed for Wheezing or  Shortness of Air.    Provider, MD Sam   amiodarone (PACERONE) 200 MG tablet Take 1 tablet by mouth 2 (Two) Times a Day. 11/22/18   Dayton Baker MD   famotidine (PEPCID) 20 MG tablet Take 1 tablet by mouth Every Night.  Patient taking differently: Take 20 mg by mouth At Night As Needed. 5/4/18   Micha Loving MD   ondansetron ODT (ZOFRAN-ODT) 4 MG disintegrating tablet Take 1 tablet by mouth Every 8 (Eight) Hours As Needed for Nausea or Vomiting. 9/8/18   Ivory Baker MD       Medications   sodium chloride 0.9 % flush 10 mL (not administered)       Vitals:    12/02/18 2247   BP: 127/82   Pulse: 78   Resp: 16   Temp:    SpO2: 99%         Objective   Physical Exam   Constitutional: She is oriented to person, place, and time. She appears well-developed and well-nourished.   HENT:   Head: Normocephalic and atraumatic.   Neck: Normal range of motion. Neck supple.   Cardiovascular: Normal rate and regular rhythm.   Pulmonary/Chest: Effort normal and breath sounds normal.   Abdominal: Soft. Bowel sounds are normal.   Musculoskeletal: Normal range of motion. She exhibits edema.   Neurological: She is alert and oriented to person, place, and time.   Skin: Skin is warm and dry.   Psychiatric: She has a normal mood and affect. Her behavior is normal.   Nursing note and vitals reviewed.      Procedures         Lab Results (last 24 hours)     Procedure Component Value Units Date/Time    CBC & Differential [[id number]] Collected:  12/02/18 2213    Specimen:  Blood Updated:  12/02/18 2220    Narrative:       The following orders were created for panel order CBC & Differential.  Procedure                               Abnormality         Status                     ---------                               -----------         ------                     CBC Auto Differential[[id number]]        Abnormal            Final result                 Please view results for these tests on the individual orders.    Comprehensive  Metabolic Panel [[id number]]  (Abnormal) Collected:  12/02/18 2213    Specimen:  Blood Updated:  12/02/18 2235     Glucose 97 mg/dL      BUN 25 mg/dL      Creatinine 1.83 mg/dL      Sodium 136 mmol/L      Potassium 3.4 mmol/L      Chloride 89 mmol/L      CO2 33.0 mmol/L      Calcium 8.9 mg/dL      Total Protein 8.4 g/dL      Albumin 4.20 g/dL      ALT (SGPT) 27 U/L      AST (SGOT) 43 U/L      Alkaline Phosphatase 196 U/L      Total Bilirubin 1.2 mg/dL      eGFR Non African Amer 27 mL/min/1.73      Globulin 4.2 gm/dL      A/G Ratio 1.0 g/dL      BUN/Creatinine Ratio 13.7     Anion Gap 14.0 mmol/L     Narrative:       The MDRD GFR formula is only valid for adults with stable renal function between ages 18 and 70.    Magnesium [[id number]]  (Normal) Collected:  12/02/18 2213    Specimen:  Blood Updated:  12/02/18 2230     Magnesium 1.9 mg/dL     Troponin [[id number]]  (Abnormal) Collected:  12/02/18 2213    Specimen:  Blood Updated:  12/02/18 2242     Troponin I 0.037 ng/mL     CBC Auto Differential [[id number]]  (Abnormal) Collected:  12/02/18 2213    Specimen:  Blood Updated:  12/02/18 2220     WBC 6.91 10*3/mm3      RBC 4.79 10*6/mm3      Hemoglobin 13.3 g/dL      Hematocrit 40.9 %      MCV 85.4 fL      MCH 27.8 pg      MCHC 32.5 g/dL      RDW 23.2 %      RDW-SD 71.2 fl      MPV 9.0 fL      Platelets 223 10*3/mm3      Neutrophil % 80.8 %      Lymphocyte % 8.4 %      Monocyte % 8.2 %      Eosinophil % 0.7 %      Basophil % 0.6 %      Immature Grans % 1.3 %      Neutrophils, Absolute 5.58 10*3/mm3      Lymphocytes, Absolute 0.58 10*3/mm3      Monocytes, Absolute 0.57 10*3/mm3      Eosinophils, Absolute 0.05 10*3/mm3      Basophils, Absolute 0.04 10*3/mm3      Immature Grans, Absolute 0.09 10*3/mm3      nRBC 0.0 /100 WBC           No orders to display       ED Course  ED Course as of Dec 02 2304   Sun Dec 02, 2018   2303 Told patient that her testing looked okay right now with no signs of anything dangerous.  She is  discharged in stable condition.  [TR]      ED Course User Index  [TR] Idris Carballo Jr., MD          MDM  Number of Diagnoses or Management Options  Dizziness: established and worsening  Palpitations: new and requires workup     Amount and/or Complexity of Data Reviewed  Clinical lab tests: ordered and reviewed  Tests in the radiology section of CPT®: ordered and reviewed  Tests in the medicine section of CPT®: reviewed and ordered  Decide to obtain previous medical records or to obtain history from someone other than the patient: yes    Risk of Complications, Morbidity, and/or Mortality  Presenting problems: moderate  Diagnostic procedures: moderate  Management options: moderate    Patient Progress  Patient progress: stable      Final diagnoses:   Palpitations   Dizziness          Idris Carballo Jr., MD  12/02/18 3089

## 2018-12-05 NOTE — PROGRESS NOTES
Subjective:     Encounter Date:10/16/2018      Patient ID: Evelin Leach is a 73 y.o. female.    Chief Complaint:  CHF f/u  73-year-old female previously followed by Dr. Rojas in [place] for nonischemic dilated cardiomyopathy, status post ICD implantation in [place], whom I met during a hospitalization from 4/23/18-5/4/18.  It was initially thought that she had acute cholecystitis, and I was asked to see her for preoperative consultation.  After reviewing her history and gathering medical records, it became clear that she had significant left ventricular systolic dysfunction and the treatment for presumed acute cholecystitis with intravenous fluids caused her heart failure to decompensate, requiring admission to the CCU.  She did have a [place] catheter placed and briefly required inotropic therapy.  She improved and was initiated on afterload reduction therapy with Isordil and hydralazine, both slowly and safely titrated upward to goal doses as established by the A-HeFT trial.  She was discharged on May 4 with prescriptions for Isordil 40 mg 3 times a day and hydralazine 75 mg 3 times a day.  Also, she had previously been treated with sotalol but had very frequent ventricular ectopy and NSVT; ultimately the sotalol was discontinued during the aformentioned hospital admission, and she was started on amiodarone with good response.  She was not requiring any diuretics at the time of discharge.  She saw Dr Rojas 5/11 for worsened leg swelling and was rx'ed 20mg daily and was told to reduce isordil from 40mg tid to 20mg tid.  She reported no noticeable response to lasix 20mg when I saw her here in my office on 5/15/18 for follow-up, at which time she also reported not having any orthopnea nor worsening of her respiratory status.  At that visit, I did switch her back to trial-directed goal dose of long-acting nitrate, at 120 mg of Imdur daily.      On 6/19/18, she was doing well so I attempted to start Toprol  XL 25mg daily. She ended up being admitted 6/26-6/28/18 for nausea. It was unclear whether symptoms were due to BB or from gallstones.  She was discharged home and had been doing well, with her goal/ideal weight being ~173-174#. At times she has fluctuated up to 180-183  (accompanied by worsened leg swelling, orthopnea, and SINGH).  Additional Lasix doses had been working, but recently her weight around 183#, but has not been responding to extra lasix. Associated with mild worsening of dyspnea, orthopnea, and leg swelling.  Associated with increased cough as well. Has been taking up to Lasix 40mg TID, without improvement.     She was then admitted from 9/10 until 9/21/18, with symptoms of volume overload and progressive renal failure.  She ultimately had a tunneled catheter placed was initiated on dialysis.  She could not tolerate her outpatient heart failure medications due to hypotension during this admission, but ultimately was restarted on lowest dose hydralazine prior to discharge.  Since then, she has continued receiving dialysis 3 days a week, but has not been able to get hydralazine at all due to blood pressure. She has lost significant amount of fluid weight since initiating dialysis, and as a result does feel somewhat improved from a respiratory standpoint she has no other associated chest pain or palpitations.    She was readmitted briefly for dizziness and palpitations that were thought due to nonsustained ventricular tachycardia.  She was given IV amiodarone infusion is started by the emergency department on 11/21/18, and ectopy declined with subsequent improvement in her symptoms.  She was discharged home the following day.  She returns today for her first outpatient visit with me since that brief admission.  She reports that overall, her breathing and swelling have been relatively stable.  Her BP has been fluctuating, as high as 140s and low as 80s. When in 80s, is associated with  dizziness.        Congestive Heart Failure   Presents for follow-up visit. Associated symptoms include palpitations. Pertinent negatives include no chest pain, claudication, near-syncope or shortness of breath.   Dizziness   Associated symptoms include weakness. Pertinent negatives include no chest pain.   Palpitations    Associated symptoms include dizziness and weakness. Pertinent negatives include no chest pain, malaise/fatigue, near-syncope, shortness of breath or syncope.     The following portions of the patient's history were reviewed and updated as appropriate: allergies, current medications, past family history, past medical history, past social history, past surgical history and problem list.    Review of Systems   Constitution: Positive for weakness. Negative for malaise/fatigue.   Cardiovascular: Positive for dyspnea on exertion, leg swelling and palpitations. Negative for chest pain, claudication, near-syncope, orthopnea, paroxysmal nocturnal dyspnea and syncope.   Respiratory: Negative for shortness of breath.    Hematologic/Lymphatic: Does not bruise/bleed easily.   Neurological: Positive for dizziness.        Objective:      Vitals:    12/05/18 1316   BP: (!) 81/48   Pulse: 81   SpO2: 98%     Physical Exam   Constitutional: She is oriented to person, place, and time. She appears well-developed and well-nourished.   Neck: No JVD present.   Cardiovascular: Normal rate, regular rhythm, normal heart sounds and intact distal pulses.  Occasional extrasystoles are present.   No murmur heard.  Pulmonary/Chest: Effort normal and breath sounds normal.   Musculoskeletal: She exhibits edema (1+ BLE edema).   Neurological: She is alert and oriented to person, place, and time.   Skin: Skin is warm and dry.       Lab Review:       Procedures  Results for orders placed during the hospital encounter of 04/23/18   Adult Transthoracic Echo Complete W/ Cont if Necessary Per Protocol    Narrative · Severe dilated  cardiomyopathy (LVEF <20% with severe global   hypokinesis).  · Left ventricular diastolic dysfunction is noted (grade II w/high LAP)   consistent with pseudonormalization.  · Mild aortic valve stenosis is present.  · The left ventricular cavity is severely dilated.  · Mild mitral valve regurgitation is present  · Left atrial cavity size is severely dilated.  · Estimated right ventricular systolic pressure from tricuspid   regurgitation is moderately elevated (45-55 mmHg).  · There is a small (<1cm) circumferential pericardial effusion.          Assessment/Plan:       Problem List Items Addressed This Visit        Cardiovascular and Mediastinum    Dilated cardiomyopathy (CMS/HCC) - Primary (Chronic): Stable but very fragile     Overview     EF <20% on echo 4/23/18     Non-sustained ventricular tachycardia: Improved/stable           Genitourinary    End stage renal disease on dialysis (CMS/HCC) stable           Plan/recommendations: After having that the patient in April 2018, I have tried to place her on indicated medications for severe LV systolic dysfunction.  We initially had mild success with hydralazine and Isordil, but then she decompensated a month later when starting a beta blocker.  Subsequently, she has been initiated on dialysis, and since that time, has not been able to tolerate any traditional heart failure therapies (including hydralazine, Isordil, beta blocker, etc.).  Therefore, at this point, I am afraid we are extremely limited from a medical treatment standpoint.  Continue to control volume status by dialysis.  We will continue amiodarone to suppress ventricular arrhythmias.      F/u 3 months, unless symptoms worsen in meantime    Dayton Baker MD   12/07/18  2:41 PM

## 2018-12-11 NOTE — OUTREACH NOTE
CHF Week 3 Survey      Responses   Facility patient discharged from?  [facility]   Does the patient have one of the following disease processes/diagnoses(primary or secondary)?  CHF   Week 3 attempt successful?  No   Unsuccessful attempts  Attempt 1          Yuki Seals RN

## 2018-12-14 NOTE — OUTREACH NOTE
CHF Week 3 Survey      Responses   Facility patient discharged from?  [facility]   Does the patient have one of the following disease processes/diagnoses(primary or secondary)?  CHF   Week 3 attempt successful?  Yes   Call start time  1952   Call end time  1955   Discharge diagnosis  Syncope, CKD V on chronic dialysis, Acute on chronic systolic and diastolic heart failure, VT, Dilated cardiomyopathy, essential HTN, Mixed HLD, UTI, Hypokalmeia   Meds reviewed with patient/caregiver?  Yes   Is the patient having any side effects they believe may be caused by any medication additions or changes?  No   Does the patient have all medications ordered at discharge?  Yes   Is the patient taking all medications as directed (includes completed medication regime)?  Yes   Does the patient have a primary care provider?   Yes   Does the patient have an appointment with their PCP within 7 days of discharge?  Yes   Has the patient kept scheduled appointments due by today?  Yes   Comments  She is going back to [facility] tomorrow for pain medication for her back.   Has home health visited the patient within 72 hours of discharge?  N/A   What DME was ordered?  On home O2.    Psychosocial issues?  No   Did the patient receive a copy of their discharge instructions?  Yes   Nursing interventions  Reviewed instructions with patient   What is the patient's perception of their health status since discharge?  Improving   Nursing interventions  Nurse provided patient education   Is the patient weighing daily?  Yes   Does the patient have scales?  Yes   Is the patient able to teach back Heart Failure diet management?  Yes   Is the patient able to teach back Heart Failure Zones?  Yes   Is the patient able to teach back signs and symptoms of worsening condition? (i.e. weight gain, shortness of air, etc.)  Yes   Is the patient/caregiver able to teach back the hierarchy of who to call/visit for symptoms/problems? PCP, Specialist, Home health nurse, Urgent  "Care, ED, 911  Yes   Additional teach back comments  Her back is \"out\" and she is in a great deal of pain she says.   CHF Week 3 call completed?  Yes          Sheeba De La Torre RN  "

## 2018-12-20 NOTE — OUTREACH NOTE
CHF Week 4 Survey      Responses   Facility patient discharged from?  [facility]   Does the patient have one of the following disease processes/diagnoses(primary or secondary)?  CHF   Week 4 attempt successful?  Yes   Call start time  1034   Call end time  1039   Discharge diagnosis  Syncope, CKD V on chronic dialysis, Acute on chronic systolic and diastolic heart failure, VT, Dilated cardiomyopathy, essential HTN, Mixed HLD, UTI, Hypokalmeia   Person spoke with today (if not patient) and relationship  Vern, Daughter   Meds reviewed with patient/caregiver?  Yes   Is the patient taking all medications as directed (includes completed medication regime)?  Yes   Has the patient kept scheduled appointments due by today?  Yes   Is the patient still receiving Home Health Services?  Yes   What is the patient's perception of their health status since discharge?  New symptoms unrelated to diagnosis   Is the patient weighing daily?  No   Daily weight interventions  Education provided on importance of daily weight   Is the patient able to teach back Heart Failure Zones?  Yes   Additional teach back comments  Pt has recently been seen in ER and found to have 3 fractures in back. In a lot of pain. Will be seeing PCP for referral to surgeon. She is light headed at time. B/p gets low at time.  No dizziness.    Week 4 Call Completed?  Yes   Would the patient like one additional call?  No   Graduated  Yes          Kassidy Velasco RN

## 2018-12-20 NOTE — OUTREACH NOTE
CHF Week 4 Survey      Responses   Facility patient discharged from?  [facility]   Does the patient have one of the following disease processes/diagnoses(primary or secondary)?  CHF   Week 4 attempt successful?  Yes   Call start time  1034   Call end time  1039   Discharge diagnosis  Syncope, CKD V on chronic dialysis, Acute on chronic systolic and diastolic heart failure, VT, Dilated cardiomyopathy, essential HTN, Mixed HLD, UTI, Hypokalmeia   Person spoke with today (if not patient) and relationship  Vern, Daughter   Meds reviewed with patient/caregiver?  Yes   Is the patient taking all medications as directed (includes completed medication regime)?  Yes   Has the patient kept scheduled appointments due by today?  Yes   Is the patient still receiving Home Health Services?  Yes   What is the patient's perception of their health status since discharge?  New symptoms unrelated to diagnosis   Is the patient weighing daily?  No   Daily weight interventions  Education provided on importance of daily weight   Is the patient able to teach back Heart Failure Zones?  Yes   Additional teach back comments  Pt has recently been seen in ER and found to have 3 fractures in back. In a lot of pain. Will be seeing PCP for referral to surgeon. She is light headed at time. B/p gets low at time.  No dizziness.    Week 4 Call Completed?  Yes   Would the patient like one additional call?  No   Graduated  Yes          Kassidy Velasco RN

## 2018-12-24 NOTE — TELEPHONE ENCOUNTER
"Caller states that her mom is a patient of Dr. Baker who does not want her BP to be over 100/60, she is also a patient of Dr. Estrella who sees her in dialysis clinic and prescribed midodrine to raise her bp.  Daughter states that when she takes the midodrine her heart rate goes to 130.  Patient is having dialysis today and she is going to have Dr. Estrella contact Dr. Baker and come to an agreement on treatment.  Reason for Disposition  • Caller has NON-URGENT medication question about med that PCP prescribed and triager unable to answer question    Additional Information  • Negative: Drug overdose and nurse unable to answer question  • Negative: Caller requesting information not related to medicine  • Negative: Caller requesting a prescription for Strep throat and has a positive culture result  • Negative: Rash while taking a medication or within 3 days of stopping it  • Negative: Immunization reaction suspected  • Negative: [1] Asthma and [2] having symptoms of asthma (cough, wheezing, etc)  • Negative: MORE THAN A DOUBLE DOSE of a prescription or over-the-counter (OTC) drug  • Negative: [1] DOUBLE DOSE (an extra dose or lesser amount) of over-the-counter (OTC) drug AND [2] any symptoms (e.g., dizziness, nausea, pain, sleepiness)  • Negative: [1] DOUBLE DOSE (an extra dose or lesser amount) of prescription drug AND [2] any symptoms (e.g., dizziness, nausea, pain, sleepiness)  • Negative: Took another person's prescription drug  • Negative: [1] DOUBLE DOSE (an extra dose or lesser amount) of prescription drug AND [2] NO symptoms (Exception: a double dose of antibiotics)  • Negative: Diabetes drug error or overdose (e.g., insulin or extra dose)  • Negative: [1] Request for URGENT new prescription or refill of \"essential\" medication (i.e., likelihood of harm to patient if not taken) AND [2] triager unable to fill per unit policy  • Negative: [1] Prescription not at pharmacy AND [2] was prescribed today by PCP  • Negative: " "Pharmacy calling with prescription questions and triager unable to answer question  • Negative: Caller has URGENT medication question about med that PCP prescribed and triager unable to answer question    Answer Assessment - Initial Assessment Questions  1. SYMPTOMS: \"Do you have any symptoms?\"      no  2. SEVERITY: If symptoms are present, ask \"Are they mild, moderate or severe?\"      no    Protocols used: MEDICATION QUESTION CALL-ADULT-    "

## 2019-01-01 ENCOUNTER — HOSPITAL ENCOUNTER (INPATIENT)
Facility: HOSPITAL | Age: 74
LOS: 1 days | End: 2019-03-07
Attending: SURGERY | Admitting: SURGERY

## 2019-01-01 ENCOUNTER — HOSPITAL ENCOUNTER (EMERGENCY)
Facility: HOSPITAL | Age: 74
Discharge: HOME | End: 2019-01-19
Attending: EMERGENCY MEDICINE | Admitting: EMERGENCY MEDICINE

## 2019-01-01 ENCOUNTER — TELEPHONE (OUTPATIENT)
Dept: CARDIOLOGY | Facility: CLINIC | Age: 74
End: 2019-01-01

## 2019-01-01 ENCOUNTER — ANESTHESIA (OUTPATIENT)
Dept: PERIOP | Facility: HOSPITAL | Age: 74
End: 2019-01-01

## 2019-01-01 ENCOUNTER — PREP FOR SURGERY (OUTPATIENT)
Dept: OTHER | Facility: HOSPITAL | Age: 74
End: 2019-01-01

## 2019-01-01 ENCOUNTER — TELEPHONE (OUTPATIENT)
Dept: VASCULAR SURGERY | Facility: CLINIC | Age: 74
End: 2019-01-01

## 2019-01-01 ENCOUNTER — APPOINTMENT (OUTPATIENT)
Dept: GENERAL RADIOLOGY | Facility: HOSPITAL | Age: 74
End: 2019-01-01

## 2019-01-01 ENCOUNTER — HOSPITAL ENCOUNTER (OUTPATIENT)
Dept: ULTRASOUND IMAGING | Facility: HOSPITAL | Age: 74
Discharge: HOME | End: 2019-02-25
Admitting: SURGERY

## 2019-01-01 ENCOUNTER — APPOINTMENT (OUTPATIENT)
Dept: CARDIOLOGY | Facility: HOSPITAL | Age: 74
End: 2019-01-01

## 2019-01-01 ENCOUNTER — ANESTHESIA EVENT (OUTPATIENT)
Dept: PERIOP | Facility: HOSPITAL | Age: 74
End: 2019-01-01

## 2019-01-01 ENCOUNTER — HOSPITAL ENCOUNTER (OUTPATIENT)
Dept: GENERAL RADIOLOGY | Facility: HOSPITAL | Age: 74
Discharge: HOME | End: 2019-03-04
Admitting: SURGERY

## 2019-01-01 ENCOUNTER — OFFICE VISIT (OUTPATIENT)
Dept: VASCULAR SURGERY | Facility: CLINIC | Age: 74
End: 2019-01-01

## 2019-01-01 ENCOUNTER — HOSPITAL ENCOUNTER (EMERGENCY)
Facility: HOSPITAL | Age: 74
Discharge: HOME | End: 2019-01-07
Attending: EMERGENCY MEDICINE | Admitting: EMERGENCY MEDICINE

## 2019-01-01 ENCOUNTER — APPOINTMENT (OUTPATIENT)
Dept: PREADMISSION TESTING | Facility: HOSPITAL | Age: 74
End: 2019-01-01

## 2019-01-01 VITALS
SYSTOLIC BLOOD PRESSURE: 81 MMHG | WEIGHT: 168.87 LBS | OXYGEN SATURATION: 78 % | BODY MASS INDEX: 29.92 KG/M2 | DIASTOLIC BLOOD PRESSURE: 25 MMHG | TEMPERATURE: 97.6 F | RESPIRATION RATE: 16 BRPM

## 2019-01-01 VITALS
HEIGHT: 63 IN | DIASTOLIC BLOOD PRESSURE: 61 MMHG | SYSTOLIC BLOOD PRESSURE: 100 MMHG | WEIGHT: 166 LBS | BODY MASS INDEX: 29.41 KG/M2 | RESPIRATION RATE: 19 BRPM | TEMPERATURE: 97.6 F | HEART RATE: 77 BPM | OXYGEN SATURATION: 98 %

## 2019-01-01 VITALS
DIASTOLIC BLOOD PRESSURE: 84 MMHG | SYSTOLIC BLOOD PRESSURE: 130 MMHG | WEIGHT: 166 LBS | HEIGHT: 63 IN | OXYGEN SATURATION: 98 % | BODY MASS INDEX: 29.41 KG/M2 | HEART RATE: 84 BPM

## 2019-01-01 VITALS
SYSTOLIC BLOOD PRESSURE: 97 MMHG | HEIGHT: 63 IN | OXYGEN SATURATION: 100 % | BODY MASS INDEX: 29.41 KG/M2 | TEMPERATURE: 97.7 F | HEART RATE: 59 BPM | DIASTOLIC BLOOD PRESSURE: 51 MMHG | RESPIRATION RATE: 17 BRPM

## 2019-01-01 VITALS
OXYGEN SATURATION: 100 % | BODY MASS INDEX: 30.12 KG/M2 | HEART RATE: 71 BPM | HEIGHT: 63 IN | SYSTOLIC BLOOD PRESSURE: 105 MMHG | WEIGHT: 169.97 LBS | DIASTOLIC BLOOD PRESSURE: 62 MMHG

## 2019-01-01 VITALS
OXYGEN SATURATION: 98 % | DIASTOLIC BLOOD PRESSURE: 80 MMHG | SYSTOLIC BLOOD PRESSURE: 104 MMHG | BODY MASS INDEX: 29.41 KG/M2 | HEIGHT: 63 IN | WEIGHT: 166 LBS | HEART RATE: 74 BPM

## 2019-01-01 DIAGNOSIS — N18.6 END STAGE KIDNEY DISEASE (HCC): ICD-10-CM

## 2019-01-01 DIAGNOSIS — N18.6 END STAGE KIDNEY DISEASE (HCC): Primary | ICD-10-CM

## 2019-01-01 DIAGNOSIS — M54.50 ACUTE MIDLINE LOW BACK PAIN WITHOUT SCIATICA: ICD-10-CM

## 2019-01-01 DIAGNOSIS — Z01.810 PREOP CARDIOVASCULAR EXAM: ICD-10-CM

## 2019-01-01 DIAGNOSIS — Z99.2 END STAGE RENAL DISEASE ON DIALYSIS (HCC): Primary | ICD-10-CM

## 2019-01-01 DIAGNOSIS — Z99.2 END STAGE RENAL DISEASE ON DIALYSIS (HCC): ICD-10-CM

## 2019-01-01 DIAGNOSIS — K59.03 DRUG-INDUCED CONSTIPATION: Primary | ICD-10-CM

## 2019-01-01 DIAGNOSIS — N18.6 END STAGE RENAL DISEASE (HCC): Primary | ICD-10-CM

## 2019-01-01 DIAGNOSIS — N18.6 END STAGE RENAL DISEASE ON DIALYSIS (HCC): Primary | ICD-10-CM

## 2019-01-01 DIAGNOSIS — R60.1 GENERALIZED EDEMA: ICD-10-CM

## 2019-01-01 DIAGNOSIS — R11.2 NON-INTRACTABLE VOMITING WITH NAUSEA, UNSPECIFIED VOMITING TYPE: Primary | ICD-10-CM

## 2019-01-01 DIAGNOSIS — N18.6 END STAGE RENAL DISEASE ON DIALYSIS (HCC): ICD-10-CM

## 2019-01-01 LAB
A-A DO2: ABNORMAL MMHG
ALBUMIN SERPL-MCNC: 3 G/DL (ref 3.5–5)
ALBUMIN SERPL-MCNC: 4.2 G/DL (ref 3.5–5)
ALBUMIN/GLOB SERPL: 1 G/DL (ref 1.1–2.5)
ALBUMIN/GLOB SERPL: 1.1 G/DL (ref 1.1–2.5)
ALP SERPL-CCNC: 221 U/L (ref 24–120)
ALP SERPL-CCNC: 237 U/L (ref 24–120)
ALT SERPL W P-5'-P-CCNC: 21 U/L (ref 0–54)
ALT SERPL W P-5'-P-CCNC: 74 U/L (ref 0–54)
ANION GAP SERPL CALCULATED.3IONS-SCNC: 11 MMOL/L (ref 4–13)
ANION GAP SERPL CALCULATED.3IONS-SCNC: 14 MMOL/L (ref 4–13)
ANION GAP SERPL CALCULATED.3IONS-SCNC: 16 MMOL/L (ref 4–13)
APTT PPP: 135.7 SECONDS (ref 24.1–34.8)
ARTERIAL PATENCY WRIST A: ABNORMAL
ARTERIAL PATENCY WRIST A: ABNORMAL
AST SERPL-CCNC: 169 U/L (ref 7–45)
AST SERPL-CCNC: 36 U/L (ref 7–45)
ATMOSPHERIC PRESS: 759 MMHG
ATMOSPHERIC PRESS: 760 MMHG
BASE EXCESS BLDA CALC-SCNC: -10.9 MMOL/L (ref 0–2)
BASE EXCESS BLDA CALC-SCNC: -12.4 MMOL/L (ref 0–2)
BASOPHILS # BLD AUTO: 0.01 10*3/MM3 (ref 0–0.2)
BASOPHILS # BLD AUTO: 0.03 10*3/MM3 (ref 0–0.2)
BASOPHILS NFR BLD AUTO: 0.1 % (ref 0–2)
BASOPHILS NFR BLD AUTO: 0.5 % (ref 0–2)
BDY SITE: ABNORMAL
BDY SITE: ABNORMAL
BH CV ECHO MEAS - AO MAX PG (FULL): 2 MMHG
BH CV ECHO MEAS - AO MAX PG: 3.2 MMHG
BH CV ECHO MEAS - AO MEAN PG (FULL): 1 MMHG
BH CV ECHO MEAS - AO MEAN PG: 2 MMHG
BH CV ECHO MEAS - AO ROOT AREA (BSA CORRECTED): 1.3
BH CV ECHO MEAS - AO ROOT AREA: 4.5 CM^2
BH CV ECHO MEAS - AO ROOT DIAM: 2.4 CM
BH CV ECHO MEAS - AO V2 MAX: 88.9 CM/SEC
BH CV ECHO MEAS - AO V2 MEAN: 58.1 CM/SEC
BH CV ECHO MEAS - AO V2 VTI: 9.4 CM
BH CV ECHO MEAS - AVA(I,A): 2.5 CM^2
BH CV ECHO MEAS - AVA(I,D): 2.5 CM^2
BH CV ECHO MEAS - AVA(V,A): 2.3 CM^2
BH CV ECHO MEAS - AVA(V,D): 2.3 CM^2
BH CV ECHO MEAS - BSA(HAYCOCK): 1.9 M^2
BH CV ECHO MEAS - BSA: 1.8 M^2
BH CV ECHO MEAS - BZI_BMI: 29.8 KILOGRAMS/M^2
BH CV ECHO MEAS - BZI_METRIC_HEIGHT: 160 CM
BH CV ECHO MEAS - BZI_METRIC_WEIGHT: 76.2 KG
BH CV ECHO MEAS - EDV(CUBED): 272.1 ML
BH CV ECHO MEAS - EDV(MOD-SP4): 105 ML
BH CV ECHO MEAS - EDV(TEICH): 214.5 ML
BH CV ECHO MEAS - EF(CUBED): 27.5 %
BH CV ECHO MEAS - EF(MOD-SP4): 18 %
BH CV ECHO MEAS - EF(TEICH): 21.7 %
BH CV ECHO MEAS - ESV(CUBED): 197.1 ML
BH CV ECHO MEAS - ESV(MOD-SP4): 86.1 ML
BH CV ECHO MEAS - ESV(TEICH): 167.9 ML
BH CV ECHO MEAS - FS: 10.2 %
BH CV ECHO MEAS - IVS/LVPW: 1.1
BH CV ECHO MEAS - IVSD: 0.62 CM
BH CV ECHO MEAS - LA DIMENSION: 3.8 CM
BH CV ECHO MEAS - LA/AO: 1.6
BH CV ECHO MEAS - LAT PEAK E' VEL: 7.9 CM/SEC
BH CV ECHO MEAS - LV DIASTOLIC VOL/BSA (35-75): 58.5 ML/M^2
BH CV ECHO MEAS - LV MASS(C)D: 147.9 GRAMS
BH CV ECHO MEAS - LV MASS(C)DI: 82.3 GRAMS/M^2
BH CV ECHO MEAS - LV MAX PG: 1.1 MMHG
BH CV ECHO MEAS - LV MEAN PG: 1 MMHG
BH CV ECHO MEAS - LV SYSTOLIC VOL/BSA (12-30): 47.9 ML/M^2
BH CV ECHO MEAS - LV V1 MAX: 53.6 CM/SEC
BH CV ECHO MEAS - LV V1 MEAN: 30.7 CM/SEC
BH CV ECHO MEAS - LV V1 VTI: 6.1 CM
BH CV ECHO MEAS - LVIDD: 6.5 CM
BH CV ECHO MEAS - LVIDS: 5.8 CM
BH CV ECHO MEAS - LVLD AP4: 6.9 CM
BH CV ECHO MEAS - LVLS AP4: 6.9 CM
BH CV ECHO MEAS - LVOT AREA (M): 3.8 CM^2
BH CV ECHO MEAS - LVOT AREA: 3.8 CM^2
BH CV ECHO MEAS - LVOT DIAM: 2.2 CM
BH CV ECHO MEAS - LVPWD: 0.56 CM
BH CV ECHO MEAS - MED PEAK E' VEL: 3.26 CM/SEC
BH CV ECHO MEAS - MR MAX PG: 29.9 MMHG
BH CV ECHO MEAS - MR MAX VEL: 271 CM/SEC
BH CV ECHO MEAS - MR MEAN PG: 15 MMHG
BH CV ECHO MEAS - MR MEAN VEL: 177 CM/SEC
BH CV ECHO MEAS - MR VTI: 79 CM
BH CV ECHO MEAS - MV DEC TIME: 0.13 SEC
BH CV ECHO MEAS - MV E MAX VEL: 75.2 CM/SEC
BH CV ECHO MEAS - PI END-D VEL: 209 CM/SEC
BH CV ECHO MEAS - RAP SYSTOLE: 5 MMHG
BH CV ECHO MEAS - RVSP: 35.7 MMHG
BH CV ECHO MEAS - SI(AO): 23.7 ML/M^2
BH CV ECHO MEAS - SI(CUBED): 41.7 ML/M^2
BH CV ECHO MEAS - SI(LVOT): 12.9 ML/M^2
BH CV ECHO MEAS - SI(MOD-SP4): 10.5 ML/M^2
BH CV ECHO MEAS - SI(TEICH): 26 ML/M^2
BH CV ECHO MEAS - SV(AO): 42.6 ML
BH CV ECHO MEAS - SV(CUBED): 75 ML
BH CV ECHO MEAS - SV(LVOT): 23.2 ML
BH CV ECHO MEAS - SV(MOD-SP4): 18.9 ML
BH CV ECHO MEAS - SV(TEICH): 46.6 ML
BH CV ECHO MEAS - TR MAX VEL: 277 CM/SEC
BH CV ECHO MEASUREMENTS AVERAGE E/E' RATIO: 13.48
BILIRUB SERPL-MCNC: 0.7 MG/DL (ref 0.1–1)
BILIRUB SERPL-MCNC: 1.4 MG/DL (ref 0.1–1)
BODY TEMPERATURE: 37 C
BODY TEMPERATURE: 37 C
BUN BLD-MCNC: 28 MG/DL (ref 5–21)
BUN BLD-MCNC: 33 MG/DL (ref 5–21)
BUN BLD-MCNC: 41 MG/DL (ref 5–21)
BUN/CREAT SERPL: 10.7 (ref 7–25)
BUN/CREAT SERPL: 11.4 (ref 7–25)
BUN/CREAT SERPL: 9.6 (ref 7–25)
CALCIUM SPEC-SCNC: 8 MG/DL (ref 8.4–10.4)
CALCIUM SPEC-SCNC: 9.3 MG/DL (ref 8.4–10.4)
CALCIUM SPEC-SCNC: 9.6 MG/DL (ref 8.4–10.4)
CHLORIDE SERPL-SCNC: 88 MMOL/L (ref 98–110)
CHLORIDE SERPL-SCNC: 90 MMOL/L (ref 98–110)
CHLORIDE SERPL-SCNC: 98 MMOL/L (ref 98–110)
CK MB SERPL-CCNC: 3.79 NG/ML (ref 0–5)
CO2 SERPL-SCNC: 18 MMOL/L (ref 24–31)
CO2 SERPL-SCNC: 27 MMOL/L (ref 24–31)
CO2 SERPL-SCNC: 30 MMOL/L (ref 24–31)
COHGB MFR BLD: 0.5 % (ref 0–5)
CREAT BLD-MCNC: 2.93 MG/DL (ref 0.5–1.4)
CREAT BLD-MCNC: 3.08 MG/DL (ref 0.5–1.4)
CREAT BLD-MCNC: 3.59 MG/DL (ref 0.5–1.4)
DEPRECATED RDW RBC AUTO: 61.3 FL (ref 40–54)
DEPRECATED RDW RBC AUTO: 61.6 FL (ref 40–54)
DEPRECATED RDW RBC AUTO: 67.8 FL (ref 40–54)
EOSINOPHIL # BLD AUTO: 0.02 10*3/MM3 (ref 0–0.7)
EOSINOPHIL # BLD AUTO: 0.03 10*3/MM3 (ref 0–0.7)
EOSINOPHIL NFR BLD AUTO: 0.3 % (ref 0–4)
EOSINOPHIL NFR BLD AUTO: 0.5 % (ref 0–4)
ERYTHROCYTE [DISTWIDTH] IN BLOOD BY AUTOMATED COUNT: 17.2 % (ref 12–15)
ERYTHROCYTE [DISTWIDTH] IN BLOOD BY AUTOMATED COUNT: 17.6 % (ref 12–15)
ERYTHROCYTE [DISTWIDTH] IN BLOOD BY AUTOMATED COUNT: 21.2 % (ref 12–15)
GFR SERPL CREATININE-BSD FRML MDRD: 12 ML/MIN/1.73
GFR SERPL CREATININE-BSD FRML MDRD: 18 ML/MIN/1.73
GFR SERPL CREATININE-BSD FRML MDRD: 19 ML/MIN/1.73
GFR SERPL CREATININE-BSD FRML MDRD: ABNORMAL ML/MIN/1.73
GLOBULIN UR ELPH-MCNC: 2.8 GM/DL
GLOBULIN UR ELPH-MCNC: 4.2 GM/DL
GLUCOSE BLD-MCNC: 108 MG/DL (ref 70–100)
GLUCOSE BLD-MCNC: 184 MG/DL (ref 70–100)
GLUCOSE BLD-MCNC: 87 MG/DL (ref 70–100)
HCO3 BLDA-SCNC: 16.6 MMOL/L (ref 20–26)
HCO3 BLDA-SCNC: 21.9 MMOL/L (ref 20–26)
HCT VFR BLD AUTO: 43.5 % (ref 37–47)
HCT VFR BLD AUTO: 43.9 % (ref 37–47)
HCT VFR BLD AUTO: 45.3 % (ref 37–47)
HCT VFR BLD CALC: 49.7 % (ref 38–51)
HGB BLD-MCNC: 14 G/DL (ref 12–16)
HGB BLD-MCNC: 14.4 G/DL (ref 12–16)
HGB BLD-MCNC: 14.4 G/DL (ref 12–16)
HGB BLDA-MCNC: 16.2 G/DL (ref 12–16)
HOLD SPECIMEN: NORMAL
HOLD SPECIMEN: NORMAL
HOROWITZ INDEX BLD+IHG-RTO: 100 %
HOROWITZ INDEX BLD+IHG-RTO: 100 %
IMM GRANULOCYTES # BLD AUTO: 0.03 10*3/MM3 (ref 0–0.05)
IMM GRANULOCYTES # BLD AUTO: 0.05 10*3/MM3 (ref 0–0.03)
IMM GRANULOCYTES NFR BLD AUTO: 0.4 % (ref 0–5)
IMM GRANULOCYTES NFR BLD AUTO: 0.8 % (ref 0–5)
INR PPP: 0.98 (ref 0.91–1.09)
INR PPP: 1.25 (ref 0.91–1.09)
LEFT ATRIUM VOLUME INDEX: 25.1 ML/M2
LEFT ATRIUM VOLUME: 45.2 CM3
LV EF 2D ECHO EST: 20 %
LYMPHOCYTES # BLD AUTO: 0.36 10*3/MM3 (ref 0.72–4.86)
LYMPHOCYTES # BLD AUTO: 0.62 10*3/MM3 (ref 0.72–4.86)
LYMPHOCYTES NFR BLD AUTO: 10.1 % (ref 15–45)
LYMPHOCYTES NFR BLD AUTO: 5.2 % (ref 15–45)
Lab: ABNORMAL
MAXIMAL PREDICTED HEART RATE: 147 BPM
MCH RBC QN AUTO: 29.4 PG (ref 28–32)
MCH RBC QN AUTO: 30.5 PG (ref 28–32)
MCH RBC QN AUTO: 31 PG (ref 28–32)
MCHC RBC AUTO-ENTMCNC: 31.8 G/DL (ref 33–36)
MCHC RBC AUTO-ENTMCNC: 31.9 G/DL (ref 33–36)
MCHC RBC AUTO-ENTMCNC: 33.1 G/DL (ref 33–36)
MCV RBC AUTO: 88.8 FL (ref 82–98)
MCV RBC AUTO: 95.6 FL (ref 82–98)
MCV RBC AUTO: 97.4 FL (ref 82–98)
METHGB BLD QL: 1 % (ref 0–3)
MODALITY: ABNORMAL
MODALITY: ABNORMAL
MONOCYTES # BLD AUTO: 0.39 10*3/MM3 (ref 0.19–1.3)
MONOCYTES # BLD AUTO: 0.66 10*3/MM3 (ref 0.19–1.3)
MONOCYTES NFR BLD AUTO: 10.7 % (ref 4–12)
MONOCYTES NFR BLD AUTO: 5.6 % (ref 4–12)
NEUTROPHILS # BLD AUTO: 4.77 10*3/MM3 (ref 1.87–8.4)
NEUTROPHILS # BLD AUTO: 6.16 10*3/MM3 (ref 1.87–8.4)
NEUTROPHILS NFR BLD AUTO: 77.4 % (ref 39–78)
NEUTROPHILS NFR BLD AUTO: 88.4 % (ref 39–78)
NOTE: ABNORMAL
NOTIFIED BY: ABNORMAL
NOTIFIED BY: ABNORMAL
NOTIFIED WHO: ABNORMAL
NOTIFIED WHO: ABNORMAL
NRBC BLD AUTO-RTO: 0 /100 WBC (ref 0–0)
NRBC BLD AUTO-RTO: 0 /100 WBC (ref 0–0)
OXYHGB MFR BLDV: 88.7 % (ref 94–99)
PCO2 BLDA: 49 MM HG (ref 35–45)
PCO2 BLDA: 81.7 MM HG (ref 35–45)
PCO2 TEMP ADJ BLD: ABNORMAL MM HG (ref 35–45)
PEEP RESPIRATORY: 10 CM[H2O]
PH BLDA: 7.04 PH UNITS (ref 7.35–7.45)
PH BLDA: 7.14 PH UNITS (ref 7.35–7.45)
PH, TEMP CORRECTED: ABNORMAL PH UNITS (ref 7.35–7.45)
PLATELET # BLD AUTO: 112 10*3/MM3 (ref 130–400)
PLATELET # BLD AUTO: 206 10*3/MM3 (ref 130–400)
PLATELET # BLD AUTO: 249 10*3/MM3 (ref 130–400)
PMV BLD AUTO: 8.8 FL (ref 6–12)
PMV BLD AUTO: 9.3 FL (ref 6–12)
PMV BLD AUTO: 9.4 FL (ref 6–12)
PO2 BLDA: 62.1 MM HG (ref 83–108)
PO2 BLDA: 90.8 MM HG (ref 83–108)
PO2 TEMP ADJ BLD: ABNORMAL MM HG (ref 83–108)
POTASSIUM BLD-SCNC: 4.9 MMOL/L (ref 3.5–5.3)
POTASSIUM BLD-SCNC: 5 MMOL/L (ref 3.5–5.3)
POTASSIUM BLD-SCNC: 5.1 MMOL/L (ref 3.5–5.3)
POTASSIUM BLDA-SCNC: 4.8 MMOL/L (ref 3.5–5.2)
PROT SERPL-MCNC: 5.8 G/DL (ref 6.3–8.7)
PROT SERPL-MCNC: 8.4 G/DL (ref 6.3–8.7)
PROTHROMBIN TIME: 13.3 SECONDS (ref 11.9–14.6)
PROTHROMBIN TIME: 16.1 SECONDS (ref 11.9–14.6)
RBC # BLD AUTO: 4.59 10*6/MM3 (ref 4.2–5.4)
RBC # BLD AUTO: 4.65 10*6/MM3 (ref 4.2–5.4)
RBC # BLD AUTO: 4.9 10*6/MM3 (ref 4.2–5.4)
SAO2 % BLDCOA: 81.8 % (ref 94–99)
SAO2 % BLDCOA: 90.1 % (ref 94–99)
SET MECH RESP RATE: 16
SODIUM BLD-SCNC: 130 MMOL/L (ref 135–145)
SODIUM BLD-SCNC: 131 MMOL/L (ref 135–145)
SODIUM BLD-SCNC: 131 MMOL/L (ref 135–145)
SODIUM BLDA-SCNC: 130 MMOL/L (ref 136–145)
STRESS TARGET HR: 125 BPM
TROPONIN I SERPL-MCNC: 0.22 NG/ML (ref 0–0.03)
VENTILATOR MODE: ABNORMAL
VENTILATOR MODE: AC
VT ON VENT VENT: 500 ML
WBC NRBC COR # BLD: 13.02 10*3/MM3 (ref 4.8–10.8)
WBC NRBC COR # BLD: 6.16 10*3/MM3 (ref 4.8–10.8)
WBC NRBC COR # BLD: 6.97 10*3/MM3 (ref 4.8–10.8)
WHOLE BLOOD HOLD SPECIMEN: NORMAL
WHOLE BLOOD HOLD SPECIMEN: NORMAL

## 2019-01-01 PROCEDURE — 25010000002 HEPARIN (PORCINE) PER 1000 UNITS: Performed by: SURGERY

## 2019-01-01 PROCEDURE — 25010000002 AMIODARONE IN DEXTROSE 5% 360-4.14 MG/200ML-% SOLUTION: Performed by: SURGERY

## 2019-01-01 PROCEDURE — 82375 ASSAY CARBOXYHB QUANT: CPT

## 2019-01-01 PROCEDURE — 84484 ASSAY OF TROPONIN QUANT: CPT | Performed by: SURGERY

## 2019-01-01 PROCEDURE — 82805 BLOOD GASES W/O2 SATURATION: CPT

## 2019-01-01 PROCEDURE — 99283 EMERGENCY DEPT VISIT LOW MDM: CPT

## 2019-01-01 PROCEDURE — 93306 TTE W/DOPPLER COMPLETE: CPT | Performed by: INTERNAL MEDICINE

## 2019-01-01 PROCEDURE — 93005 ELECTROCARDIOGRAM TRACING: CPT

## 2019-01-01 PROCEDURE — 25010000003 EPINEPHRINE 30 MG/30ML SOLUTION 30 ML VIAL: Performed by: NURSE ANESTHETIST, CERTIFIED REGISTERED

## 2019-01-01 PROCEDURE — 05JY0ZZ INSPECTION OF UPPER VEIN, OPEN APPROACH: ICD-10-PCS | Performed by: SURGERY

## 2019-01-01 PROCEDURE — 85025 COMPLETE CBC W/AUTO DIFF WBC: CPT | Performed by: SURGERY

## 2019-01-01 PROCEDURE — 99214 OFFICE O/P EST MOD 30 MIN: CPT | Performed by: SURGERY

## 2019-01-01 PROCEDURE — 25010000002 PROPOFOL 10 MG/ML EMULSION: Performed by: NURSE ANESTHETIST, CERTIFIED REGISTERED

## 2019-01-01 PROCEDURE — 25010000002 FENTANYL CITRATE (PF) 100 MCG/2ML SOLUTION 50 ML VIAL: Performed by: SURGERY

## 2019-01-01 PROCEDURE — 85027 COMPLETE CBC AUTOMATED: CPT | Performed by: SURGERY

## 2019-01-01 PROCEDURE — 83050 HGB METHEMOGLOBIN QUAN: CPT

## 2019-01-01 PROCEDURE — 93306 TTE W/DOPPLER COMPLETE: CPT

## 2019-01-01 PROCEDURE — 85610 PROTHROMBIN TIME: CPT | Performed by: SURGERY

## 2019-01-01 PROCEDURE — 74018 RADEX ABDOMEN 1 VIEW: CPT

## 2019-01-01 PROCEDURE — 5A1935Z RESPIRATORY VENTILATION, LESS THAN 24 CONSECUTIVE HOURS: ICD-10-PCS | Performed by: SURGERY

## 2019-01-01 PROCEDURE — 94799 UNLISTED PULMONARY SVC/PX: CPT

## 2019-01-01 PROCEDURE — 80053 COMPREHEN METABOLIC PANEL: CPT | Performed by: EMERGENCY MEDICINE

## 2019-01-01 PROCEDURE — 0399T ADULT TRANSTHORACIC ECHO COMPLETE W/ CONT IF NECESSARY PER PROTOCOL: CPT | Performed by: INTERNAL MEDICINE

## 2019-01-01 PROCEDURE — 0399T HC MYOCARDL STRAIN IMAG QUAN ASSMT PER SESS: CPT

## 2019-01-01 PROCEDURE — 93010 ELECTROCARDIOGRAM REPORT: CPT | Performed by: INTERNAL MEDICINE

## 2019-01-01 PROCEDURE — 99284 EMERGENCY DEPT VISIT MOD MDM: CPT

## 2019-01-01 PROCEDURE — 25010000002 AMIODARONE IN DEXTROSE 5% 360-4.14 MG/200ML-% SOLUTION

## 2019-01-01 PROCEDURE — 36415 COLL VENOUS BLD VENIPUNCTURE: CPT

## 2019-01-01 PROCEDURE — 25010000002 FENTANYL CITRATE (PF) 100 MCG/2ML SOLUTION: Performed by: ANESTHESIOLOGY

## 2019-01-01 PROCEDURE — 94002 VENT MGMT INPAT INIT DAY: CPT

## 2019-01-01 PROCEDURE — 80053 COMPREHEN METABOLIC PANEL: CPT | Performed by: SURGERY

## 2019-01-01 PROCEDURE — 93970 EXTREMITY STUDY: CPT

## 2019-01-01 PROCEDURE — 93970 EXTREMITY STUDY: CPT | Performed by: SURGERY

## 2019-01-01 PROCEDURE — 25010000002 AMIODARONE PER 30 MG: Performed by: NURSE ANESTHETIST, CERTIFIED REGISTERED

## 2019-01-01 PROCEDURE — 25010000002 PHENYLEPHRINE PER 1 ML: Performed by: NURSE ANESTHETIST, CERTIFIED REGISTERED

## 2019-01-01 PROCEDURE — 99238 HOSP IP/OBS DSCHRG MGMT 30/<: CPT | Performed by: SURGERY

## 2019-01-01 PROCEDURE — 85025 COMPLETE CBC W/AUTO DIFF WBC: CPT | Performed by: EMERGENCY MEDICINE

## 2019-01-01 PROCEDURE — 85730 THROMBOPLASTIN TIME PARTIAL: CPT | Performed by: SURGERY

## 2019-01-01 PROCEDURE — 25010000002 EPINEPHRINE PER 0.1 MG: Performed by: SURGERY

## 2019-01-01 PROCEDURE — 25010000003 EPINEPHRINE 30 MG/30ML SOLUTION 30 ML VIAL: Performed by: SURGERY

## 2019-01-01 PROCEDURE — 82803 BLOOD GASES ANY COMBINATION: CPT

## 2019-01-01 PROCEDURE — 25010000002 EPINEPHRINE PER 0.1 MG: Performed by: NURSE ANESTHETIST, CERTIFIED REGISTERED

## 2019-01-01 PROCEDURE — 82553 CREATINE MB FRACTION: CPT | Performed by: SURGERY

## 2019-01-01 PROCEDURE — 25010000002 MIDAZOLAM PER 1 MG: Performed by: ANESTHESIOLOGY

## 2019-01-01 PROCEDURE — 0BH17EZ INSERTION OF ENDOTRACHEAL AIRWAY INTO TRACHEA, VIA NATURAL OR ARTIFICIAL OPENING: ICD-10-PCS | Performed by: SURGERY

## 2019-01-01 PROCEDURE — 71045 X-RAY EXAM CHEST 1 VIEW: CPT

## 2019-01-01 RX ORDER — BUPIVACAINE HYDROCHLORIDE 5 MG/ML
INJECTION, SOLUTION PERINEURAL AS NEEDED
Status: DISCONTINUED | OUTPATIENT
Start: 2019-01-01 | End: 2019-01-01 | Stop reason: HOSPADM

## 2019-01-01 RX ORDER — ONDANSETRON 4 MG/1
4 TABLET, ORALLY DISINTEGRATING ORAL ONCE
Status: COMPLETED | OUTPATIENT
Start: 2019-01-01 | End: 2019-01-01

## 2019-01-01 RX ORDER — IBUPROFEN 600 MG/1
600 TABLET ORAL ONCE AS NEEDED
Status: CANCELLED | OUTPATIENT
Start: 2019-01-01

## 2019-01-01 RX ORDER — MEPERIDINE HYDROCHLORIDE 25 MG/ML
12.5 INJECTION INTRAMUSCULAR; INTRAVENOUS; SUBCUTANEOUS
Status: CANCELLED | OUTPATIENT
Start: 2019-01-01 | End: 2019-03-08

## 2019-01-01 RX ORDER — CALCIUM CHLORIDE 100 MG/ML
INJECTION INTRAVENOUS; INTRAVENTRICULAR AS NEEDED
Status: DISCONTINUED | OUTPATIENT
Start: 2019-01-01 | End: 2019-01-01 | Stop reason: SURG

## 2019-01-01 RX ORDER — AMIODARONE HYDROCHLORIDE 50 MG/ML
INJECTION, SOLUTION INTRAVENOUS AS NEEDED
Status: DISCONTINUED | OUTPATIENT
Start: 2019-01-01 | End: 2019-01-01 | Stop reason: SURG

## 2019-01-01 RX ORDER — FENTANYL CITRATE 50 UG/ML
25 INJECTION, SOLUTION INTRAMUSCULAR; INTRAVENOUS AS NEEDED
Status: CANCELLED | OUTPATIENT
Start: 2019-01-01

## 2019-01-01 RX ORDER — PHENYLEPHRINE HCL IN 0.9% NACL 0.8MG/10ML
SYRINGE (ML) INTRAVENOUS AS NEEDED
Status: DISCONTINUED | OUTPATIENT
Start: 2019-01-01 | End: 2019-01-01 | Stop reason: SURG

## 2019-01-01 RX ORDER — NALOXONE HCL 0.4 MG/ML
0.4 VIAL (ML) INJECTION AS NEEDED
Status: CANCELLED | OUTPATIENT
Start: 2019-01-01

## 2019-01-01 RX ORDER — METOCLOPRAMIDE HYDROCHLORIDE 5 MG/ML
5 INJECTION INTRAMUSCULAR; INTRAVENOUS
Status: CANCELLED | OUTPATIENT
Start: 2019-01-01

## 2019-01-01 RX ORDER — FENTANYL CITRATE 50 UG/ML
25 INJECTION, SOLUTION INTRAMUSCULAR; INTRAVENOUS
Status: DISCONTINUED | OUTPATIENT
Start: 2019-01-01 | End: 2019-01-01 | Stop reason: HOSPADM

## 2019-01-01 RX ORDER — IPRATROPIUM BROMIDE AND ALBUTEROL SULFATE 2.5; .5 MG/3ML; MG/3ML
3 SOLUTION RESPIRATORY (INHALATION) ONCE AS NEEDED
Status: CANCELLED | OUTPATIENT
Start: 2019-01-01

## 2019-01-01 RX ORDER — EPINEPHRINE 1 MG/ML
INJECTION, SOLUTION, CONCENTRATE INTRAVENOUS AS NEEDED
Status: DISCONTINUED | OUTPATIENT
Start: 2019-01-01 | End: 2019-01-01 | Stop reason: SURG

## 2019-01-01 RX ORDER — LIDOCAINE HYDROCHLORIDE 20 MG/ML
INJECTION, SOLUTION INFILTRATION; PERINEURAL AS NEEDED
Status: DISCONTINUED | OUTPATIENT
Start: 2019-01-01 | End: 2019-01-01 | Stop reason: SURG

## 2019-01-01 RX ORDER — VASOPRESSIN 20 U/ML
INJECTION PARENTERAL AS NEEDED
Status: DISCONTINUED | OUTPATIENT
Start: 2019-01-01 | End: 2019-01-01 | Stop reason: SURG

## 2019-01-01 RX ORDER — MAGNESIUM CARB/ALUMINUM HYDROX 105-160MG
300 TABLET,CHEWABLE ORAL ONCE
Status: COMPLETED | OUTPATIENT
Start: 2019-01-01 | End: 2019-01-01

## 2019-01-01 RX ORDER — OXYCODONE HYDROCHLORIDE AND ACETAMINOPHEN 5; 325 MG/1; MG/1
1 TABLET ORAL EVERY 6 HOURS PRN
Qty: 20 TABLET | Refills: 0 | Status: SHIPPED | OUTPATIENT
Start: 2019-01-01 | End: 2019-01-01 | Stop reason: HOSPADM

## 2019-01-01 RX ORDER — PROMETHAZINE HYDROCHLORIDE 25 MG/1
25 TABLET ORAL ONCE
Status: DISCONTINUED | OUTPATIENT
Start: 2019-01-01 | End: 2019-01-01

## 2019-01-01 RX ORDER — LIDOCAINE HYDROCHLORIDE 40 MG/ML
SOLUTION TOPICAL AS NEEDED
Status: DISCONTINUED | OUTPATIENT
Start: 2019-01-01 | End: 2019-01-01 | Stop reason: SURG

## 2019-01-01 RX ORDER — ONDANSETRON 4 MG/1
4 TABLET, ORALLY DISINTEGRATING ORAL EVERY 4 HOURS PRN
Qty: 10 TABLET | Refills: 0 | Status: SHIPPED | OUTPATIENT
Start: 2019-01-01 | End: 2019-01-01 | Stop reason: HOSPADM

## 2019-01-01 RX ORDER — LIDOCAINE HYDROCHLORIDE 10 MG/ML
INJECTION, SOLUTION EPIDURAL; INFILTRATION; INTRACAUDAL; PERINEURAL AS NEEDED
Status: DISCONTINUED | OUTPATIENT
Start: 2019-01-01 | End: 2019-01-01 | Stop reason: HOSPADM

## 2019-01-01 RX ORDER — PROPOFOL 10 MG/ML
VIAL (ML) INTRAVENOUS AS NEEDED
Status: DISCONTINUED | OUTPATIENT
Start: 2019-01-01 | End: 2019-01-01 | Stop reason: SURG

## 2019-01-01 RX ORDER — HEPARIN SODIUM 5000 [USP'U]/ML
5000 INJECTION, SOLUTION INTRAVENOUS; SUBCUTANEOUS EVERY 8 HOURS SCHEDULED
Status: CANCELLED | OUTPATIENT
Start: 2019-03-08

## 2019-01-01 RX ORDER — LABETALOL HYDROCHLORIDE 5 MG/ML
5 INJECTION, SOLUTION INTRAVENOUS
Status: CANCELLED | OUTPATIENT
Start: 2019-01-01

## 2019-01-01 RX ORDER — MIDAZOLAM HYDROCHLORIDE 1 MG/ML
1 INJECTION INTRAMUSCULAR; INTRAVENOUS
Status: DISCONTINUED | OUTPATIENT
Start: 2019-01-01 | End: 2019-01-01 | Stop reason: HOSPADM

## 2019-01-01 RX ORDER — MIDAZOLAM HYDROCHLORIDE 1 MG/ML
2 INJECTION INTRAMUSCULAR; INTRAVENOUS
Status: DISCONTINUED | OUTPATIENT
Start: 2019-01-01 | End: 2019-01-01 | Stop reason: HOSPADM

## 2019-01-01 RX ORDER — OXYCODONE AND ACETAMINOPHEN 10; 325 MG/1; MG/1
1 TABLET ORAL ONCE AS NEEDED
Status: CANCELLED | OUTPATIENT
Start: 2019-01-01

## 2019-01-01 RX ORDER — OXYCODONE AND ACETAMINOPHEN 7.5; 325 MG/1; MG/1
2 TABLET ORAL EVERY 4 HOURS PRN
Status: CANCELLED | OUTPATIENT
Start: 2019-01-01 | End: 2019-03-17

## 2019-01-01 RX ORDER — SODIUM CHLORIDE, SODIUM LACTATE, POTASSIUM CHLORIDE, CALCIUM CHLORIDE 600; 310; 30; 20 MG/100ML; MG/100ML; MG/100ML; MG/100ML
9 INJECTION, SOLUTION INTRAVENOUS CONTINUOUS
Status: DISCONTINUED | OUTPATIENT
Start: 2019-01-01 | End: 2019-01-01 | Stop reason: HOSPADM

## 2019-01-01 RX ORDER — CISATRACURIUM BESYLATE 2 MG/ML
INJECTION, SOLUTION INTRAVENOUS AS NEEDED
Status: DISCONTINUED | OUTPATIENT
Start: 2019-01-01 | End: 2019-01-01 | Stop reason: SURG

## 2019-01-01 RX ORDER — SODIUM CHLORIDE 0.9 % (FLUSH) 0.9 %
1-10 SYRINGE (ML) INJECTION AS NEEDED
Status: DISCONTINUED | OUTPATIENT
Start: 2019-01-01 | End: 2019-01-01 | Stop reason: HOSPADM

## 2019-01-01 RX ORDER — SODIUM CHLORIDE 0.9 % (FLUSH) 0.9 %
3 SYRINGE (ML) INJECTION AS NEEDED
Status: DISCONTINUED | OUTPATIENT
Start: 2019-01-01 | End: 2019-01-01 | Stop reason: HOSPADM

## 2019-01-01 RX ORDER — DEXTROSE MONOHYDRATE 25 G/50ML
12.5 INJECTION, SOLUTION INTRAVENOUS AS NEEDED
Status: DISCONTINUED | OUTPATIENT
Start: 2019-01-01 | End: 2019-01-01 | Stop reason: HOSPADM

## 2019-01-01 RX ORDER — DOCUSATE SODIUM 100 MG/1
100 CAPSULE, LIQUID FILLED ORAL 2 TIMES DAILY
COMMUNITY
End: 2019-01-01 | Stop reason: HOSPADM

## 2019-01-01 RX ORDER — OXYCODONE HYDROCHLORIDE AND ACETAMINOPHEN 5; 325 MG/1; MG/1
1 TABLET ORAL ONCE
Status: COMPLETED | OUTPATIENT
Start: 2019-01-01 | End: 2019-01-01

## 2019-01-01 RX ORDER — ONDANSETRON 2 MG/ML
4 INJECTION INTRAMUSCULAR; INTRAVENOUS ONCE AS NEEDED
Status: CANCELLED | OUTPATIENT
Start: 2019-01-01

## 2019-01-01 RX ORDER — SODIUM CHLORIDE 9 MG/ML
500 INJECTION, SOLUTION INTRAVENOUS CONTINUOUS
Status: DISCONTINUED | OUTPATIENT
Start: 2019-01-01 | End: 2019-01-01 | Stop reason: HOSPADM

## 2019-01-01 RX ORDER — NYSTATIN 100000 [USP'U]/G
POWDER TOPICAL 3 TIMES DAILY
COMMUNITY
End: 2019-01-01 | Stop reason: HOSPADM

## 2019-01-01 RX ORDER — BUPIVACAINE HCL/0.9 % NACL/PF 0.1 %
2 PLASTIC BAG, INJECTION (ML) EPIDURAL ONCE
Status: COMPLETED | OUTPATIENT
Start: 2019-01-01 | End: 2019-01-01

## 2019-01-01 RX ORDER — BUPIVACAINE HCL/0.9 % NACL/PF 0.1 %
2 PLASTIC BAG, INJECTION (ML) EPIDURAL ONCE
Status: CANCELLED | OUTPATIENT
Start: 2019-01-01 | End: 2019-01-01

## 2019-01-01 RX ADMIN — EPINEPHRINE 0.02 MCG/KG/MIN: 1 INJECTION PARENTERAL at 09:02

## 2019-01-01 RX ADMIN — VASOPRESSIN 1 UNITS: 20 INJECTION INTRAVENOUS at 09:25

## 2019-01-01 RX ADMIN — CALCIUM CHLORIDE 1 G: 100 INJECTION, SOLUTION INTRAVENOUS; INTRAVENTRICULAR at 08:57

## 2019-01-01 RX ADMIN — MIDAZOLAM 1 MG: 1 INJECTION INTRAMUSCULAR; INTRAVENOUS at 07:20

## 2019-01-01 RX ADMIN — CISATRACURIUM BESYLATE 10 MG: 2 INJECTION INTRAVENOUS at 07:57

## 2019-01-01 RX ADMIN — EPINEPHRINE 1 MG: 1 INJECTION, SOLUTION INTRAMUSCULAR; SUBCUTANEOUS at 08:21

## 2019-01-01 RX ADMIN — FENTANYL CITRATE 0.5 MCG/KG/HR: 50 INJECTION INTRAVENOUS at 13:27

## 2019-01-01 RX ADMIN — EPINEPHRINE 1 MG: 1 INJECTION, SOLUTION INTRAMUSCULAR; SUBCUTANEOUS at 08:54

## 2019-01-01 RX ADMIN — SODIUM CHLORIDE 500 ML: 9 INJECTION, SOLUTION INTRAVENOUS at 06:25

## 2019-01-01 RX ADMIN — EPHEDRINE SULFATE 10 MG: 50 INJECTION INTRAMUSCULAR; INTRAVENOUS; SUBCUTANEOUS at 08:05

## 2019-01-01 RX ADMIN — EPINEPHRINE 1 MG: 1 INJECTION, SOLUTION INTRAMUSCULAR; SUBCUTANEOUS at 08:56

## 2019-01-01 RX ADMIN — LIDOCAINE HYDROCHLORIDE 1 EACH: 40 SOLUTION TOPICAL at 07:59

## 2019-01-01 RX ADMIN — OXYCODONE HYDROCHLORIDE AND ACETAMINOPHEN 1 TABLET: 5; 325 TABLET ORAL at 14:08

## 2019-01-01 RX ADMIN — Medication 160 MCG: at 09:10

## 2019-01-01 RX ADMIN — ONDANSETRON 4 MG: 4 TABLET, ORALLY DISINTEGRATING ORAL at 14:07

## 2019-01-01 RX ADMIN — Medication 2 G: at 08:00

## 2019-01-01 RX ADMIN — Medication 300 ML: at 14:11

## 2019-01-01 RX ADMIN — VASOPRESSIN 1 UNITS: 20 INJECTION INTRAVENOUS at 09:20

## 2019-01-01 RX ADMIN — AMIODARONE HYDROCHLORIDE 150 MG: 50 INJECTION, SOLUTION INTRAVENOUS at 09:16

## 2019-01-01 RX ADMIN — PHENYLEPHRINE HYDROCHLORIDE 0.5 MCG/KG/MIN: 10 INJECTION INTRAVENOUS at 09:10

## 2019-01-01 RX ADMIN — FENTANYL CITRATE 25 MCG: 50 INJECTION, SOLUTION INTRAMUSCULAR; INTRAVENOUS at 07:20

## 2019-01-01 RX ADMIN — EPINEPHRINE 0.3 MCG/KG/MIN: 1 INJECTION PARENTERAL at 11:54

## 2019-01-01 RX ADMIN — AMIODARONE HYDROCHLORIDE 1 MG/MIN: 1.8 INJECTION, SOLUTION INTRAVENOUS at 09:50

## 2019-01-01 RX ADMIN — LIDOCAINE HYDROCHLORIDE 50 MG: 20 INJECTION, SOLUTION INFILTRATION; PERINEURAL at 07:57

## 2019-01-01 RX ADMIN — VASOPRESSIN 1 UNITS: 20 INJECTION INTRAVENOUS at 09:13

## 2019-01-01 RX ADMIN — PROPOFOL 70 MG: 10 INJECTION, EMULSION INTRAVENOUS at 07:57

## 2019-01-01 RX ADMIN — EPINEPHRINE 0.3 MCG/KG/MIN: 1 INJECTION PARENTERAL at 09:47

## 2019-01-01 RX ADMIN — VASOPRESSIN 1 UNITS: 20 INJECTION INTRAVENOUS at 09:11

## 2019-01-01 RX ADMIN — EPINEPHRINE 1 MG: 1 INJECTION, SOLUTION INTRAMUSCULAR; SUBCUTANEOUS at 08:52

## 2019-01-04 NOTE — TELEPHONE ENCOUNTER
I called to remind the patient of her appt on Monday.  She told her daughter that she was busy and to ask what I wanted.  I told her that I was just calling to remind her of her appt with Dr. Jacobo on Monday.  She just said that she would tell her.

## 2019-01-04 NOTE — TELEPHONE ENCOUNTER
"Patient son came into office, I explained to him that I was not able to get a hold of his sister (pts daughter). He states they have been very busy with her dialysis. He states that at her last dialysis erick he took her to on 12/22/18, the nurse or NP there asked her if she was concerned about anything, and she states she was concerned about her BP she stated that her BP has been running 60-80 systolic over 20-30's diastolic. She was then prescribed midodrine 5 mg 1 tablet 3 x daily, and was given one at dialysis at 1 pm and the NP/Nurse told her son to give her another dose at 7 pm, so he did, states that her blood pressure was then running \"high\" for her 120/70-80, which is normal for most people. He states he was wondering about the medication and decided to look it up, and he states the first thing he read was to not to give to patients that have CHF or low kidney function, which is mother has both, so he states after he read that he stopped giving her the medication, until he spoke to you to see if she is okay to take it, or if she needed to stop it. Please advise. "

## 2019-01-05 NOTE — TELEPHONE ENCOUNTER
I agree that it's not advised to give midodrine to patients with low EF heart failure like Mrs. Leach.  If her BP is so low that she is symptomatic from it then she might need midodrine at those times - but the only real solution for her low BP is to change her dialysis.  She is no longer on any medications for her heart failure that might otherwise be lowering it; mainly because every time we've tried to introduce any of the usual and necessary heart failure medications they have made her BP go too low.

## 2019-01-07 NOTE — ED PROVIDER NOTES
Subjective   Patient says she was diagnosed with 3 fractures in her back 3 weeks ago.  Started on tramadol for this last week and it has made her sick and she has had some vomiting.  Had dialysis 2 days ago.  Home health nurse is worried that she may be dehydrated today and told her to come to ED.        History provided by:  Patient and relative   used: No    Vomiting   The primary symptoms include vomiting. The illness began 3 to 5 days ago. The problem has not changed since onset.  The illness is also significant for back pain. The illness does not include chills, anorexia, dysphagia, odynophagia, bloating, constipation, tenesmus or itching. Associated medical issues do not include inflammatory bowel disease, GERD, gallstones, liver disease, alcohol abuse, PUD, gastric bypass, bowel resection, irritable bowel syndrome, hemorrhoids or diverticulitis.       Review of Systems   Constitutional: Negative.  Negative for chills.   HENT: Negative.    Respiratory: Negative.    Cardiovascular: Negative.    Gastrointestinal: Positive for vomiting. Negative for anorexia, bloating, constipation and dysphagia.   Genitourinary: Negative.    Musculoskeletal: Positive for back pain.   Skin: Negative.  Negative for itching.   Neurological: Negative.    Hematological: Negative.    Psychiatric/Behavioral: Negative.    All other systems reviewed and are negative.      Past Medical History:   Diagnosis Date   • Arthritis    • Back pain    • Cataract     left eye   • CHF (congestive heart failure) (CMS/[facility])    • Enlarged heart    • Gallstones    • GERD (gastroesophageal reflux disease)    • Hyperlipidemia    • Hypertension    • Kidney failure    • Myocardial infarction (CMS/[facility])    • PONV (postoperative nausea and vomiting)    • Stage 3 chronic kidney disease (CMS/[facility]) 11/21/2018   • Stroke (CMS/[facility])    • TIA (transient ischemic attack)        Allergies   Allergen Reactions   • Aspirin Nausea And Vomiting     Had  history of  Ulcers in past   • Ciprofloxacin Hcl Rash   • Sulfa Antibiotics Rash   • Advair Diskus [Fluticasone-Salmeterol] Unknown (See Comments)     Unknown   • Ergocalciferol Unknown (See Comments)     Unknown   • Hydrochlorothiazide Unknown (See Comments)     Unknown   • Spironolactone Unknown (See Comments)     Unknown   • Vitamin D Analogs Unknown (See Comments)     Unknown   • Latex, Natural Rubber Rash       Past Surgical History:   Procedure Laterality Date   • CARDIAC CATHETERIZATION     • CARDIAC CATHETERIZATION N/A 4/26/2018    Procedure: Right Heart Cath, possible leave-in SG. Case at 130 please;  Surgeon: Dayton Baker MD;  Location: [facility] CATH INVASIVE LOCATION;  Service: Cardiovascular   • CARDIAC PACEMAKER PLACEMENT     • CARDIAC PACEMAKER PLACEMENT      [place] yrs ago   • CHOLECYSTECTOMY WITH INTRAOPERATIVE CHOLANGIOGRAM N/A 7/24/2018    Procedure: CHOLECYSTECTOMY LAPAROSCOPIC;  Surgeon: Joanna Munoz MD;  Location: [facility] OR;  Service: General   • INSERTION HEMODIALYSIS CATHETER Right 9/14/2018    Procedure: HEMODIALYSIS CATHETER INSERTION;  Surgeon: Dimitri Jacobo MD;  Location: [facility] HYBRID OR 12;  Service: Vascular       Family History   Problem Relation Age of Onset   • Heart failure Mother    • Cancer Father        Social History     Socioeconomic History   • Marital status: Legally      Spouse name: Not on file   • Number of children: Not on file   • Years of education: Not on file   • Highest education level: Not on file   Tobacco Use   • Smoking status: Former Smoker   • Smokeless tobacco: Never Used   Substance and Sexual Activity   • Alcohol use: No   • Drug use: No   • Sexual activity: Defer       Prior to Admission medications    Medication Sig Start Date End Date Taking? Authorizing Provider   albuterol (PROVENTIL HFA;VENTOLIN HFA) 108 (90 Base) MCG/ACT inhaler Inhale 2 puffs Every 4 (Four) Hours As Needed for Wheezing or Shortness of Air.     Provider, MD Sam   amiodarone (PACERONE) 200 MG tablet Take 1 tablet by mouth 2 (Two) Times a Day. 11/22/18   Dayton Baker MD   famotidine (PEPCID) 20 MG tablet Take 1 tablet by mouth Every Night.  Patient taking differently: Take 20 mg by mouth At Night As Needed. 5/4/18   Micha Loving MD   ondansetron ODT (ZOFRAN-ODT) 4 MG disintegrating tablet Take 1 tablet by mouth Every 8 (Eight) Hours As Needed for Nausea or Vomiting. 9/8/18   Ivory Baker MD       Medications   oxyCODONE-acetaminophen (PERCOCET) 5-325 MG per tablet 1 tablet (not administered)   ondansetron ODT (ZOFRAN-ODT) disintegrating tablet 4 mg (not administered)       Vitals:    01/07/19 1135   BP: 105/66   Pulse: 85   Resp: 20   Temp: 97.6 °F (36.4 °C)   SpO2: 96%         Objective   Physical Exam   Constitutional: She appears well-developed and well-nourished.   Cardiovascular: Normal rate and regular rhythm.   Pulmonary/Chest: Effort normal and breath sounds normal.   Abdominal: Soft. Bowel sounds are normal. She exhibits no distension. There is no tenderness.   Musculoskeletal: She exhibits tenderness.   Tender in lumbar area   Neurological: She is alert.   Psychiatric: She has a normal mood and affect. Her behavior is normal.   Nursing note and vitals reviewed.      Procedures         Lab Results (last 24 hours)     Procedure Component Value Units Date/Time    CBC & Differential [[id number]] Collected:  01/07/19 1333    Specimen:  Blood Updated:  01/07/19 1342    Narrative:       The following orders were created for panel order CBC & Differential.  Procedure                               Abnormality         Status                     ---------                               -----------         ------                     CBC Auto Differential[[id number]]        Abnormal            Final result                 Please view results for these tests on the individual orders.    Comprehensive Metabolic Panel [[id number]] Collected:   01/07/19 1333    Specimen:  Blood Updated:  01/07/19 1339    CBC Auto Differential [[id number]]  (Abnormal) Collected:  01/07/19 1333    Specimen:  Blood Updated:  01/07/19 1342     WBC 6.16 10*3/mm3      RBC 4.90 10*6/mm3      Hemoglobin 14.4 g/dL      Hematocrit 43.5 %      MCV 88.8 fL      MCH 29.4 pg      MCHC 33.1 g/dL      RDW 21.2 %      RDW-SD 67.8 fl      MPV 8.8 fL      Platelets 249 10*3/mm3      Neutrophil % 77.4 %      Lymphocyte % 10.1 %      Monocyte % 10.7 %      Eosinophil % 0.5 %      Basophil % 0.5 %      Immature Grans % 0.8 %      Neutrophils, Absolute 4.77 10*3/mm3      Lymphocytes, Absolute 0.62 10*3/mm3      Monocytes, Absolute 0.66 10*3/mm3      Eosinophils, Absolute 0.03 10*3/mm3      Basophils, Absolute 0.03 10*3/mm3      Immature Grans, Absolute 0.05 10*3/mm3      nRBC 0.0 /100 WBC           No orders to display       ED Course  ED Course as of Jan 07 1346 Mon Jan 07, 2019   1339 Talked with patient and her daughter at length.  Told them I would be glad to give IV fluids but her dehydration did seem to be mild based on her VS and with her h/o renal failure it might be better to not give fluids and just control symptoms and they agreed.  She does not know what pain meds will work for her but we will try something different.  [TR]      ED Course User Index  [TR] Idris Carballo Jr., MD          MDM  Number of Diagnoses or Management Options  Acute midline low back pain without sciatica: new and requires workup  Non-intractable vomiting with nausea, unspecified vomiting type: new and requires workup  Risk of Complications, Morbidity, and/or Mortality  Presenting problems: moderate  Diagnostic procedures: minimal  Management options: low    Patient Progress  Patient progress: stable      Final diagnoses:   Non-intractable vomiting with nausea, unspecified vomiting type   Acute midline low back pain without sciatica          Idris Carballo Jr., MD  01/07/19 1345

## 2019-01-07 NOTE — TELEPHONE ENCOUNTER
Patient daughter notified, states they are bringing her to the ER right now because she is getting dehydrated.

## 2019-01-19 NOTE — ED PROVIDER NOTES
"Subjective   Patient presents with a complaint of constipation.  She says she is not had a good bowel movement she thinks since Thursday.  She is on oxycodone for chronic back pain that is bothering her a lot.  She is also received started on dialysis.  She thinks the combination was to have made her constipated.  She tried an enema and got some small hard pieces of stool out but nothing more and still feels uncomfortable and wants relief.  She thinks he may have to \"pick it out\"        History provided by:  Patient   used: No    Constipation   Severity:  Severe  Time since last bowel movement:  2 days  Timing:  Constant  Progression:  Worsening  Chronicity:  New  Context: not dehydration, not dietary changes, not medication, not narcotics and not stress    Stool description:  None produced  Relieved by:  Nothing  Worsened by:  Nothing  Ineffective treatments:  Enemas and laxatives  Associated symptoms: abdominal pain and back pain    Associated symptoms: no anorexia, no diarrhea, no dysuria, no fever, no flatus, no hematochezia, no nausea, no urinary retention and no vomiting    Risk factors: no change in medication, no hx of abdominal surgery, no obesity, no recent antibiotic use, no recent illness and no recent surgery        Review of Systems   Constitutional: Negative.  Negative for fever.   HENT: Negative.    Respiratory: Negative.    Cardiovascular: Negative.    Gastrointestinal: Positive for abdominal pain and constipation. Negative for anorexia, diarrhea, flatus, hematochezia, nausea and vomiting.   Genitourinary: Negative.  Negative for dysuria.   Musculoskeletal: Positive for back pain.   Skin: Negative.    Neurological: Negative.    Hematological: Negative.    Psychiatric/Behavioral: Negative.    All other systems reviewed and are negative.      Past Medical History:   Diagnosis Date   • Arthritis    • Back pain    • Cataract     left eye   • CHF (congestive heart failure) (CMS/[facility])  "   • Enlarged heart    • Gallstones    • GERD (gastroesophageal reflux disease)    • Hyperlipidemia    • Hypertension    • Kidney failure    • Myocardial infarction (CMS/[facility])    • PONV (postoperative nausea and vomiting)    • Stage 3 chronic kidney disease (CMS/[facility]) 11/21/2018   • Stroke (CMS/[facility])    • TIA (transient ischemic attack)        Allergies   Allergen Reactions   • Aspirin Nausea And Vomiting     Had history of  Ulcers in past   • Ciprofloxacin Hcl Rash   • Sulfa Antibiotics Rash   • Advair Diskus [Fluticasone-Salmeterol] Unknown (See Comments)     Unknown   • Ergocalciferol Unknown (See Comments)     Unknown   • Hydrochlorothiazide Unknown (See Comments)     Unknown   • Spironolactone Unknown (See Comments)     Unknown   • Vitamin D Analogs Unknown (See Comments)     Unknown   • Latex, Natural Rubber Rash       Past Surgical History:   Procedure Laterality Date   • CARDIAC CATHETERIZATION     • CARDIAC CATHETERIZATION N/A 4/26/2018    Procedure: Right Heart Cath, possible leave-in SG. Case at 130 please;  Surgeon: Dayton Baker MD;  Location: [facility] CATH INVASIVE LOCATION;  Service: Cardiovascular   • CARDIAC PACEMAKER PLACEMENT     • CARDIAC PACEMAKER PLACEMENT      [place] [employer] yrs ago   • CHOLECYSTECTOMY WITH INTRAOPERATIVE CHOLANGIOGRAM N/A 7/24/2018    Procedure: CHOLECYSTECTOMY LAPAROSCOPIC;  Surgeon: Joanna Munoz MD;  Location: [facility] OR;  Service: General   • INSERTION HEMODIALYSIS CATHETER Right 9/14/2018    Procedure: HEMODIALYSIS CATHETER INSERTION;  Surgeon: Dimitri Jacobo MD;  Location: [facility] HYBRID OR 12;  Service: Vascular       Family History   Problem Relation Age of Onset   • Heart failure Mother    • Cancer Father        Social History     Socioeconomic History   • Marital status: Legally      Spouse name: Not on file   • Number of children: Not on file   • Years of education: Not on file   • Highest education level: Not on file   Tobacco Use   • Smoking  status: Former Smoker   • Smokeless tobacco: Never Used   Substance and Sexual Activity   • Alcohol use: No   • Drug use: No   • Sexual activity: Defer       Prior to Admission medications    Medication Sig Start Date End Date Taking? Authorizing Provider   albuterol (PROVENTIL HFA;VENTOLIN HFA) 108 (90 Base) MCG/ACT inhaler Inhale 2 puffs Every 4 (Four) Hours As Needed for Wheezing or Shortness of Air.    Provider, MD Sam   amiodarone (PACERONE) 200 MG tablet Take 1 tablet by mouth 2 (Two) Times a Day. 11/22/18   Dayton Baker MD   famotidine (PEPCID) 20 MG tablet Take 1 tablet by mouth Every Night.  Patient taking differently: Take 20 mg by mouth At Night As Needed. 5/4/18   Micha Loving MD   ondansetron ODT (ZOFRAN-ODT) 4 MG disintegrating tablet Take 1 tablet by mouth Every 4 (Four) Hours As Needed for Nausea or Vomiting. 1/7/19   Idris Carballo Jr., MD   oxyCODONE-acetaminophen (PERCOCET) 5-325 MG per tablet Take 1 tablet by mouth Every 6 (Six) Hours As Needed for Moderate Pain . 1/7/19   Idris Carballo Jr., MD       Medications   magnesium citrate solution 300 mL (300 mL Oral Given 1/19/19 1411)       Vitals:    01/19/19 1415   BP: 97/51   Pulse: 59   Resp: 17   Temp: 97.7 °F (36.5 °C)   SpO2: 100%         Objective   Physical Exam   Constitutional: She is oriented to person, place, and time. She appears well-developed and well-nourished.   HENT:   Head: Normocephalic and atraumatic.   Neck: Normal range of motion. Neck supple.   Cardiovascular: Normal rate and regular rhythm.   Pulmonary/Chest: Effort normal and breath sounds normal.   Abdominal: Soft. Bowel sounds are normal.   Musculoskeletal: Normal range of motion.   Neurological: She is alert and oriented to person, place, and time.   Psychiatric: She has a normal mood and affect. Her behavior is normal.   Nursing note and vitals reviewed.      Procedures         Lab Results (last 24 hours)     ** No results found for the last 24  hours. **          XR Abdomen KUB   Final Result   1. Nonspecific bowel gas pattern, limited assessment on single supine   view. Upright view or CT could be useful if there is clinical concern   for obstruction.   2. Moderate to large amount of colonic stool.   3. Cholecystectomy clips.   4. Degenerative changes in the spine. No acute bony finding.   This report was finalized on 01/19/2019 15:07 by Dr Ivory Murphy MD.          ED Course          MDM  Number of Diagnoses or Management Options  Drug-induced constipation: new and requires workup     Amount and/or Complexity of Data Reviewed  Tests in the radiology section of CPT®: ordered and reviewed    Risk of Complications, Morbidity, and/or Mortality  Presenting problems: moderate  Diagnostic procedures: moderate  Management options: moderate    Patient Progress  Patient progress: stable      Final diagnoses:   Drug-induced constipation          Idris Carballo Jr., MD  01/19/19 4609

## 2019-02-06 NOTE — TELEPHONE ENCOUNTER
"Patient's emergency contact called RN and left message on 2/5/29 indicating that patient was \"thinking about\" having her defibrillator turned off and needed to speak to someone about that.  RN called and spoke with patient on 2/6/2019.  Patient verbalized that she is thinking about having her defibrillator turned off because she no longer wants the pain of being shocked.  States that she has been shocked by device one time previously.  In regard to other issues at this time, patient verbalized that she is in constant pain with chronic back issues.  Patient also stated that, although she is currently continuing to receive dialysis, they are mentioning the need for a new fistula and she is unsure if she wants to undergo that procedure or continue dialysis.  RN provided emotional support and encouraged patient to contact Dr. Estrella's office to discuss possible discontinuation of dialysis.  Patient verbalized frustration at being unable to reach anyone at Dr. Estrella's office.  RN again encouraged her to try to contact the RN to discuss.      Patient was alert and oriented and understood the implications of stopping dialysis and turning of AICD (fatal arrhythmias) and that both could lead to death.  Patient stated that she has not officially made the decision but wants to know all of her options.  RN will check to see if [employer] rep can go to patient's location to turn off defibrillator or if patient would be required to come to [place] for clinic visit.  Patient does have 3/13/2019 appointment with Dr. Baker but is unsure if she can make that appointment s/t chronic pain issues.     Per [employer] device rep, Shelly Mijares, she would need order from MD to turn off device and would have to do so in the presence of another healthcare professional (ex: home health nurse).   RN spoke with patient who verified that she is receiving home dennys (usually once each week).  RN suggested that patient contact Dr. Rojas's " office (PCP) and/or ask home health RN if a hospice consult was possible to assist her with end of life decision making.  Patient verbalized understanding and will call RN when she has made the decision to turn off defibrillator.   RN will obtain an order from Dr. Baker to turn off defibrillator at which time patient decides to do so.

## 2019-02-06 NOTE — TELEPHONE ENCOUNTER
Yes I would do that. Sounds like you discussed with her everything that I would have, so if, fully understanding the implications, she decides to have it turned off, I would be ok with that.

## 2019-02-08 NOTE — TELEPHONE ENCOUNTER
Spoke to Ye Mcdonald NP at Dr Rojas office, stating that patient was in there this morning and wanting to turn defibrillator off due to wanting to go on hospice care.     I advised them of mai's note and your response, she states she will call the patient and just make sure that is what she wants to do, will go over the risks and what could happen, and will fax over a office note to us, so that you can read over before you place the order to turn the defibrillator off.     I will also include mai in this message.   Will wait for NP to call me back to discuss further about this issue and what patient decided she wants to do.

## 2019-02-11 NOTE — TELEPHONE ENCOUNTER
Patient daughter calling in stating that patient is wanting her defibrillator turned off and states she was told to call and make an appointment.   I advised her of Oneyda EDOUARD note, and advised Oneyda was not here today but hopefully tomorrow she will be in the office and I can find out what exactly to do.

## 2019-02-12 NOTE — TELEPHONE ENCOUNTER
Patient daughter calling in stating that she was told she needed an appt to get her mother defib turned off.     I called back advised her there is no appt necessary, but will need to the name and number for her home health, due to a nurse having to be there with Shelly our pacemaker rep to turn the device off.     Daughter stated that she is still undecided at this time, but will talk to her today and find out exactly what she wants to do and with get the name and number for us if she does decide to get the defib turned off.

## 2019-02-18 NOTE — TELEPHONE ENCOUNTER
I called the patient's daughter Vern, and gave her the appointment information on 2/25/19.  I explained the test was first arrive at 10:30 then come for the office visit.    She expressed understanding.

## 2019-02-18 NOTE — PATIENT INSTRUCTIONS

## 2019-02-18 NOTE — PROGRESS NOTES
02/18/2019      Michael Rojas MD  [address] [place] [address] [place] [address]        Evelin Leach  1945    Chief Complaint   Patient presents with   • Establish Care     Referred over by April from [facility] for possible Fistula. Prior permacath insertion.       Dear Michael Rojas MD:    HPI     I had the pleasure of seeing you patient in the office today for follow up.  As you recall, the patient is a 73 y.o. female who we are currently following for ESRD.  She is a past medical history including CHF, chronic back pain, hyperlipidemia, and hypertension.  I had previously seen her in a hospitalization in September of last year at which time she was found to have worsening renal function requiring dialysis.  As such a right internal jugular vein permacath was placed at that time.  She was since discharged and has been having outpatient dialysis.  It now appears that her renal disease is end-stage and she will will require permanent dialysis.  As such she returns today for evaluation for more permanent access creation.  She denies any prior hemodialysis access in the form of a fistula or graft.  Her only other complaint today is nausea as she was unable to go to her dialysis session on Saturday secondary to the weather.  She otherwise denies fevers or chills.  She has no other acute complaints.      Review of Systems   Constitutional: Positive for fatigue. Negative for activity change, appetite change, chills, diaphoresis and fever.   HENT: Negative.  Negative for congestion, sore throat and trouble swallowing.    Eyes: Negative.  Negative for visual disturbance.   Respiratory: Negative.  Negative for chest tightness and shortness of breath.    Cardiovascular: Positive for leg swelling. Negative for chest pain and palpitations.   Gastrointestinal: Positive for nausea. Negative for abdominal distention and abdominal pain.   Endocrine: Negative.  Negative for cold intolerance and heat intolerance.  "  Genitourinary: Negative.    Musculoskeletal: Positive for back pain.   Skin: Negative.    Allergic/Immunologic: Negative.    Neurological: Negative.    Hematological: Negative.    Psychiatric/Behavioral: Negative.        /84 (BP Location: Left arm, Patient Position: Sitting, Cuff Size: Adult)   Pulse 84   Ht 160 cm (63\")   Wt 75.3 kg (166 lb)   SpO2 98%   BMI 29.41 kg/m²   Physical Exam   Constitutional: She is oriented to person, place, and time. She appears well-developed and well-nourished.   HENT:   Head: Normocephalic and atraumatic.   Eyes: EOM are normal. Pupils are equal, round, and reactive to light.   Neck: Normal range of motion. Neck supple. No JVD present.   Right internal jugular vein permacath in place   Cardiovascular: Normal rate, regular rhythm and intact distal pulses.   Pulses:       Carotid pulses are 2+ on the right side, and 2+ on the left side.       Radial pulses are 2+ on the right side, and 2+ on the left side.   Pulmonary/Chest: Effort normal. No respiratory distress.   Abdominal: Soft. She exhibits no distension and no mass. There is no tenderness.   Musculoskeletal: Normal range of motion. She exhibits edema (Lower extremity edema to the level of the knee). She exhibits no tenderness or deformity.   Neurological: She is alert and oriented to person, place, and time. No sensory deficit. She exhibits normal muscle tone.   Skin: Skin is warm and dry. Capillary refill takes 2 to 3 seconds.   Psychiatric: She has a normal mood and affect. Her behavior is normal. Judgment and thought content normal.   Vitals reviewed.      DIAGNOSTIC DATA:    Xr Abdomen Kub    Result Date: 1/19/2019  Narrative: EXAM: XR ABDOMEN KUB- - 1/19/2019 11:25 AM CST  HISTORY: constipation   COMPARISON: 9/10/2018.  TECHNIQUE:  1 images.  Supine view of the abdomen.  FINDINGS:  See impression       Impression: 1. Nonspecific bowel gas pattern, limited assessment on single supine view. Upright view or CT " could be useful if there is clinical concern for obstruction. 2. Moderate to large amount of colonic stool. 3. Cholecystectomy clips. 4. Degenerative changes in the spine. No acute bony finding. This report was finalized on 01/19/2019 15:07 by Dr Ivory Murphy MD.      Patient Active Problem List   Diagnosis   • Essential hypertension   • Mixed hyperlipidemia   • Dilated cardiomyopathy (CMS/[facility])   • Ventricular tachycardia (CMS/HCC)   • Acute kidney injury (CMS/[facility])   • Gastroesophageal reflux disease without esophagitis   • Acute gout of left foot   • Acute gout of right foot   • Acute UTI (urinary tract infection)   • Cholelithiasis   • Hypokalemia   • Pain   • BEE (acute kidney injury) (CMS/[facility])   • Acute on chronic systolic and diastolic heart failure, NYHA class 3 (CMS/[facility])   • Nausea and vomiting   • Hypertension secondary to other renal disorders   • Near syncope   • End stage renal disease on dialysis (CMS/[facility])   • Presence of single chamber automatic cardioverter/defibrillator (AICD)         ICD-10-CM ICD-9-CM   1. End stage renal disease on dialysis (CMS/[facility]) N18.6 585.6    Z99.2 V45.11   2. Generalized edema  R60.1 782.3       Lab Frequency Next Occurrence   US Vein Mapping Bilateral Once 02/25/2019       PLAN: After thoroughly evaluating Evelin Leach, I believe the best course of action is to proceed with vein mapping of the bilateral upper extremities.  Patient is right-handed and so ideally we would create a left upper extremity arteriovenous fistula for her.  However on clinical exam her veins do not appear very robust.  As such I will planned to obtain the vein mapping and based on its findings will make further recommendations.  If she does not have adequate vein for creation of a fistula then I would recommend that we proceed with creation of a left arm arteriovenous graft.  I will plan to see her back in the office in 1 week following the vein mapping and we can further discuss the results and  my recommendations at that time.  The patient is to continue taking their medications as previously discussed.   I did discuss vascular risk factors as they pertain to the progression of vascular disease including controlling hypertension, and hyperlipidemia.. Patient's Body mass index is 29.41 kg/m². BMI is above normal parameters. Recommendations include: educational material. This was all discussed in full with complete understanding.  Thank you for allowing me to participate in the care of your patient.  Please do not hesitate to call with any questions or concerns.  We will keep you aware of any further encounters with Evelin Leach.      Sincerely Yours,      Dimitri Jacobo MD

## 2019-02-19 ENCOUNTER — HOSPITAL ENCOUNTER (EMERGENCY)
Dept: HOSPITAL 58 - ED | Age: 74
Discharge: HOME | End: 2019-02-19
Payer: COMMERCIAL

## 2019-02-19 VITALS — TEMPERATURE: 97.9 F | SYSTOLIC BLOOD PRESSURE: 103 MMHG | DIASTOLIC BLOOD PRESSURE: 70 MMHG

## 2019-02-19 VITALS — BODY MASS INDEX: 29.5 KG/M2

## 2019-02-19 DIAGNOSIS — Z86.73: ICD-10-CM

## 2019-02-19 DIAGNOSIS — I10: ICD-10-CM

## 2019-02-19 DIAGNOSIS — K59.00: Primary | ICD-10-CM

## 2019-02-19 DIAGNOSIS — Z95.0: ICD-10-CM

## 2019-02-19 PROCEDURE — 85025 COMPLETE CBC W/AUTO DIFF WBC: CPT

## 2019-02-19 PROCEDURE — 36415 COLL VENOUS BLD VENIPUNCTURE: CPT

## 2019-02-19 PROCEDURE — 99283 EMERGENCY DEPT VISIT LOW MDM: CPT

## 2019-02-19 NOTE — ED.PDOC
General


ED Provider: 


Dr. JARED BA





Chief Complaint: Constipation


Stated Complaint: COMPLAINS OF CONSTIPATION.  States has not had a BM in a 

week.  Has attempted to have a BM by taking ExLax, Stool Softners and even had 

an enema last evening without results.


Time Seen by Physician: 11:15


Mode of Arrival: Wheelchair


Information Source: Patient, Family


Exam Limitations: No limitations


Primary Care Provider: 


MARY WANG





Nursing and Triage Documentation Reviewed and Agree: Yes


Does patient meet sepsis criteria?: No


System Inflammatory Response Syndrome: Not Applicable


Sepsis Protocol: 


For patient's 13 years and over:





Temp is 96.8 and below  and greater


Pulse >90 BPM


Resp >20/minute


Acutely Altered Mental Status





Are patient's symptoms suggestive of a new infection, such as:


   -Pneumonia


   -Skin, Soft Tissue


   -Endocarditis


   -UTI


   -Bone, Joint Infection


   -Implantable Device


   -Acute Abdominal Infection


   -Wound Infection


   -Meningitis


   -Blood Stream Catheter Infection


   -Unknown








GI Complaint Exam





- Abdominal Pain Complaint/Exam


Onset: Gradual


Duration: 2-3 days


Symptoms Are: Still present


Timing: Intermittent


Initial Severity: Moderate


Current Severity: Moderate


Location of Pain: LLQ


Radiates To: Denies: Chest


Character: Reports: Cramping


Aggravating: Reports: Position


Alleviating: Reports: Rest, Position


Associated Signs and Symptoms: Reports: Constipation.  Denies: Diaphoresis, 

Fever, Cough, Chest pain, Dizziness, Back pain, Blood in stool, Dysuria, 

Urinary frequency, Decreased urine output, Decreased appetite, Vaginal bleeding

, Vaginal discharge, Nausea, Vomiting, Diarrhea, Sore throat, Decreased activity


Related History: Reports: Similar episode


Abdominal Findings: Present: None


Rectal Exam: Present: Other (Fecal impaction)


Differential Diagnoses: Constipation





Review of Systems





- Review Of Systems


Constitutional: Reports: No symptoms


Eyes: Reports: No symptoms


Ears, Nose, Mouth, Throat: Reports: No symptoms


Respiratory: Reports: No symptoms


Cardiac: Reports: No symptoms


GI: Reports: Abdominal pain, Constipated


: Reports: No symptoms


Musculoskeletal: Reports: No symptoms


Skin: Reports: No symptoms


Neurological: Reports: No symptoms


Endocrine: Reports: No symptoms


Hematologic/Lymphatic: Reports: No symptoms


All Other Systems: Reviewed and Negative





Past Medical History





- Past Medical History


Previously Healthy: Yes


Endocrine: Reports: None


Cardiovascular: Reports: Hypertension


Respiratory: Reports: None


Hematological: Reports: Anemia


Gastrointestinal: Reports: GERD, Other (chronic constipation )


Genitourinary: Reports: None, Other (chronic dialysis)


Neuro/Psych: Reports: TIA


Musculoskeletal: Reports: Arthritis


Cancer: Reports: None


Last Menstrual Period: NONE





- Surgical History


General Surgical History: Reports: Pacemaker (defibrilltor. )





- Family History


Family History: Reports: Heart





- Social History


Smoking Status: Former smoker


Hx Substance Use: No


Alcohol Screening: None





Physical Exam





- Physical Exam


Appearance: Ill-appearing, Obese


Ill-appearing: Mild


Pain Distress: Moderate


Eyes: KAYLA, EOMI, Conjunctiva clear


ENT: Ears normal, Nose normal, Oropharynx normal


Neck: Supple


Respiratory: Airway patent, Breath sounds clear, Breath sounds equal, 

Respirations nonlabored


Cardiovascular: RRR, Pulses normal, No rub, No murmur


GI/: Soft, Tender


Musculoskeletal: Normal strength


Skin: Warm, Dry, Normal color


Neurological: Sensation intact, Motor intact


Psychiatric: Affect appropriate, Mood appropriate





Procedures





- Additional Procedures


Additional Procedures: Other (Manual Removal Fecal Impaction )





Critical Care Note





- Critical Care Note


Total Time (mins): 60





Course





- Course


Hematology/Chemistry: 


 02/19/19 12:30





Orders, Labs, Meds: 


Lab Review











  02/19/19





  12:30


 


WBC  9.35


 


RBC  4.62


 


Hgb  14.1


 


Hct  41.4


 


MCV  89.6


 


MCH  30.5


 


MCHC  34.1


 


RDW Coeff of Nori  17.7 H


 


Plt Count  164


 


Immature Gran % (Auto)  0.3


 


Neut % (Auto)  90.5


 


Lymph % (Auto)  2.2 L


 


Mono % (Auto)  6.8


 


Eos % (Auto)  0.1


 


Baso % (Auto)  0.1


 


Immature Gran # (Auto)  0.0


 


Neut # (Auto)  8.5 H


 


Lymph # (Auto)  0.2 L


 


Mono # (Auto)  0.6


 


Eos # (Auto)  0.0


 


Baso # (Auto)  0.0








Orders











 Category Date Time Status


 


 ED ENEMA/RECTAL TUBE .ONCE EMERGENCY  02/19/19 11:35 Active


 


 CBC W/ AUTO DIFF Stat LAB  02/19/19 12:30 Completed


 


 KUB [ABDOMEN 1 VIEW] Stat RADS  02/19/19 11:37 Completed











Vital Signs: 


 











  Temp Pulse Resp BP Pulse Ox


 


 02/19/19 11:08  97.9 F  92 H  20  103/70  92 L














Departure





- Departure


Time of Disposition: 13:00


Disposition: HOME SELF-CARE


Discharge Problem: 


 Acute constipation





Instructions:  Constipation (ED), High Fiber Diet (ED)


Condition: Good


Pt referred to PMD for follow-up: Yes


IPMP verified?: No


Additional Instructions: 


Miralax daily





MOM 15 ml twice daily 


Stay well hydrated


Increase diet fiber/metamucil


Allergies/Adverse Reactions: 


Allergies





aspirin Adverse Reaction (Verified 02/19/19 11:13)


 


cholecalciferol (vitamin D3) [From Vitamin D3] Adverse Reaction (Verified 02/19/ 19 11:13)


 


ciprofloxacin Adverse Reaction (Verified 02/19/19 11:13)


 


codeine Adverse Reaction (Verified 02/19/19 11:13)


 


dexamethasone [From Decadron] Adverse Reaction (Verified 02/19/19 11:13)


 


dexamethasone sod phosphate [From Decadron] Adverse Reaction (Verified 02/19/19 

11:13)


 


ergocalciferol (vitamin D2) [From Vitamin D2] Adverse Reaction (Verified 02/19/ 19 11:13)


 constipation


fluticasone propionate [From Advair Diskus] Adverse Reaction (Verified 02/19/19 

11:13)


 


hydrochlorothiazide Adverse Reaction (Verified 02/19/19 11:13)


 angioedema


salmeterol xinafoate [From Advair Diskus] Adverse Reaction (Verified 02/19/19 11

:13)


 


spironolactone Adverse Reaction (Verified 02/19/19 11:13)


 


Sulfa (Sulfonamide Antibiotics) Adverse Reaction (Verified 02/19/19 11:13)


 








Home Medications: 


Ambulatory Orders





Amiodarone HCl 200 mg PO BID 12/19/18 








Disposition Discussed With: Patient, Family

## 2019-02-19 NOTE — DI
EXAM:  KUB 

  

HISTORY:  Constipation with abdominal cramping 

  

FINDINGS:  There appears to be slight excess fecal retention throughout most of the colon with sparin
g of the descending and rectosigmoid.  The rectum is clear.  Bowel gas pattern is within normal limit
s.  No suspicious calcifications.  Incidental note of incomplete fusion of the posterior elements of 
the upper sacral segment. 

  

  

IMPRESSION: 

1.  Findings consistent with the patient's history, the patient although the distal colon is clear an
d the bowel gas pattern remains within normal limits.

## 2019-02-22 NOTE — TELEPHONE ENCOUNTER
I tried to call the patient to remind her of her testing and appt with Dr. Jacobo, however, there was no answer/machine.

## 2019-02-22 NOTE — TELEPHONE ENCOUNTER
I have not heard anything back. I talked to patients daughter on 02/12/19, she stated then that patient was still undecided at this time, but would call back once she has made her mind up, to give us the number of home health nurse, and that way [facility] could place order to have it turned off. I still have not heard anything from patient or her daughter.

## 2019-02-25 NOTE — PATIENT INSTRUCTIONS

## 2019-02-25 NOTE — PROGRESS NOTES
02/25/2019      Michael Rojas MD  [address] [place] [address] [place] [address]        Evelin Leach  1945    Chief Complaint   Patient presents with   • F/u - needs AV fistula     Referred over by April from [facility] for possible Fistula. Prior permacath insertion.       Dear Michael Rojas MD:    HPI     I had the pleasure of seeing you patient in the office today for follow up.  As you recall, the patient is a 73 y.o. female who we are currently following for ESRD.  She has a past medical history including CHF, chronic back pain, hyperlipidemia, and hypertension.  I had previously seen her in a hospitalization in September of last year at which time she was found to have worsening renal function requiring dialysis.  As such a right internal jugular vein permacath was placed at that time.  She returns today after having undergone upper extremity vein mapping in preparation for access creation.  I did review her study today and her veins are not of adequate caliber for fistula creation.  As such I would recommend placement of a left upper extremity arteriovenous graft for her ultimate access.  She otherwise feels well today and is without acute complaint.    Review of Systems   Constitutional: Positive for fatigue. Negative for activity change, appetite change, chills, diaphoresis and fever.   HENT: Negative.  Negative for congestion, sore throat and trouble swallowing.    Eyes: Negative.  Negative for visual disturbance.   Respiratory: Negative.  Negative for chest tightness and shortness of breath.    Cardiovascular: Positive for leg swelling. Negative for chest pain and palpitations.   Gastrointestinal: Positive for nausea. Negative for abdominal distention and abdominal pain.   Endocrine: Negative.  Negative for cold intolerance and heat intolerance.   Genitourinary: Negative.    Musculoskeletal: Positive for back pain.   Skin: Negative.    Allergic/Immunologic: Negative.    Neurological: Negative.   "  Hematological: Negative.    Psychiatric/Behavioral: Negative.        /80   Pulse 74   Ht 160 cm (63\")   Wt 75.3 kg (166 lb)   SpO2 98%   BMI 29.41 kg/m²   Physical Exam   Constitutional: She is oriented to person, place, and time. She appears well-developed and well-nourished.   HENT:   Head: Normocephalic and atraumatic.   Eyes: EOM are normal. Pupils are equal, round, and reactive to light.   Neck: Normal range of motion. Neck supple. No JVD present.   Right internal jugular vein permacath in place   Cardiovascular: Normal rate, regular rhythm and intact distal pulses.   Pulses:       Carotid pulses are 2+ on the right side, and 2+ on the left side.       Radial pulses are 2+ on the right side, and 2+ on the left side.   Pulmonary/Chest: Effort normal. No respiratory distress.   Abdominal: Soft. She exhibits no distension and no mass. There is no tenderness.   Musculoskeletal: Normal range of motion. She exhibits edema (Lower extremity edema to the level of the knee). She exhibits no tenderness or deformity.   Neurological: She is alert and oriented to person, place, and time. No sensory deficit. She exhibits normal muscle tone.   Skin: Skin is warm and dry. Capillary refill takes 2 to 3 seconds.   Psychiatric: She has a normal mood and affect. Her behavior is normal. Judgment and thought content normal.   Vitals reviewed.      DIAGNOSTIC DATA:    No results found.    Patient Active Problem List   Diagnosis   • Essential hypertension   • Mixed hyperlipidemia   • Dilated cardiomyopathy (CMS/HCC)   • Ventricular tachycardia (CMS/HCC)   • Acute kidney injury (CMS/HCC)   • Gastroesophageal reflux disease without esophagitis   • Acute gout of left foot   • Acute gout of right foot   • Acute UTI (urinary tract infection)   • Cholelithiasis   • Hypokalemia   • Pain   • BEE (acute kidney injury) (CMS/HCC)   • Acute on chronic systolic and diastolic heart failure, NYHA class 3 (CMS/HCC)   • Nausea and vomiting "   • Hypertension secondary to other renal disorders   • Near syncope   • End stage renal disease on dialysis (CMS/[facility])   • Presence of single chamber automatic cardioverter/defibrillator (AICD)         ICD-10-CM ICD-9-CM   1. End stage renal disease (CMS/HCC) N18.6 585.6   2. Preop cardiovascular exam Z01.810 V72.81       Lab Frequency Next Occurrence   US Vein Mapping Bilateral Once 02/25/2019       PLAN: After thoroughly evaluating Evelin Leach, I believe the best course of action is to proceed with creation of a left upper extremity arterial venous graft.  I did review her upper extremity vein mapping today in the office and I do not feel that her veins will be of adequate caliber for successful fistula creation.  Given her past history of CHF I will make referral to cardiology for preoperative risk stratification as this procedure will likely need to be done under general anesthesia.  When she has been evaluated from a cardiac standpoint then I will plan to book her for the OR. Risks/benefits were explained at great length to the patient which include but are not limited to bleeding, infection of the graft, vessel damage, steal syndrome, and graft failure.  She understands and wishes to proceed.  The patient is to continue taking their medications as previously discussed.   I did discuss vascular risk factors as they pertain to the progression of vascular disease including controlling hypertension, and hyperlipidemia.. Patient's Body mass index is 29.41 kg/m². BMI is above normal parameters. Recommendations include: educational material. This was all discussed in full with complete understanding.  Thank you for allowing me to participate in the care of your patient.  Please do not hesitate to call with any questions or concerns.  We will keep you aware of any further encounters with Evelin Leach.      Sincerely Yours,      Dimitri Jacobo MD

## 2019-03-01 PROBLEM — N18.6 END STAGE KIDNEY DISEASE (HCC): Status: ACTIVE | Noted: 2019-01-01

## 2019-03-01 NOTE — TELEPHONE ENCOUNTER
Spoke with patient and advised of upcoming procedure.  Patient is scheduled for 3/7/2019 with an arrival time of 515 am.  Patient pre work is scheduled for 3/4/2019 with an arrival time of 145 pm.  Patient will also received dialysis same day.  Spoke with Evan for OP dialysis, confirming that patient was at [facility] and giving surgical date and time. Patient advised of location, time and prep.  Patient expressed understanding for all that was discussed. Gave patient all contact information for any questions or concerns she may have.

## 2019-03-01 NOTE — TELEPHONE ENCOUNTER
She's going to be high risk for any surgery, but unless her condition has rapidly deteriorated (excessive weight gain with worsened breathing) this risk is non-modifiable and the same as it was when the port was initially placed

## 2019-03-04 NOTE — TELEPHONE ENCOUNTER
Spoke with patient caregiver.  Advised of upcoming pre work and procedure.  Patient care giver advised of time, location and prep.  Patient care giver expressed understanding for all that was discused

## 2019-03-07 PROBLEM — I50.42 CHF (CONGESTIVE HEART FAILURE), NYHA CLASS III, CHRONIC, COMBINED (HCC): Status: ACTIVE | Noted: 2018-01-01

## 2019-03-07 PROBLEM — I46.9 CARDIOPULMONARY ARREST (HCC): Status: ACTIVE | Noted: 2019-01-01

## 2019-03-07 NOTE — ANESTHESIA PROCEDURE NOTES
Airway  Urgency: elective    Airway not difficult    General Information and Staff    Patient location during procedure: OR  CRNA: Solitario Pagan CRNA    Indications and Patient Condition  Indications for airway management: airway protection    Preoxygenated: yes  Mask difficulty assessment: 1 - vent by mask    Final Airway Details  Final airway type: endotracheal airway      Successful airway: ETT  Cuffed: yes   Successful intubation technique: direct laryngoscopy  Blade: River  Blade size: 2  ETT size (mm): 7.0  Cormack-Lehane Classification: grade I - full view of glottis  Placement verified by: chest auscultation and capnometry   Cuff volume (mL): 5  Measured from: lips  ETT to lips (cm): 21  Number of attempts at approach: 1

## 2019-03-07 NOTE — ANESTHESIA PREPROCEDURE EVALUATION
Anesthesia Evaluation     Patient summary reviewed   history of anesthetic complications: PONV  NPO Solid Status: > 8 hours             Airway   Mallampati: II  TM distance: >3 FB  Neck ROM: full  Dental    (+) upper dentures and lower dentures    Pulmonary    (-) asthma, sleep apnea, not a smoker  Cardiovascular     ECG reviewed    (+) pacemaker ([employer] ICD, hypertension, CHF (ef 20%), hyperlipidemia,   (-) angina, cardiac stents      Neuro/Psych  (+) CVA,     (-) seizures  GI/Hepatic/Renal/Endo    (+) obesity,  GERD,  renal disease (iHD last 3/5) ESRD and dialysis,   (-) liver disease, diabetes    Musculoskeletal     Abdominal    Substance History      OB/GYN          Other                        Anesthesia Plan    ASA 4     MAC   (High risk mace d/w patient)  intravenous induction   Anesthetic plan, all risks, benefits, and alternatives have been provided, discussed and informed consent has been obtained with: patient.

## 2019-03-07 NOTE — ANESTHESIA POSTPROCEDURE EVALUATION
Patient: Evelin Leach    Procedure Summary     Date:  03/07/19 Room / Location:  [facility] OR  / [facility] HYBRID OR 12    Anesthesia Start:  0755 Anesthesia Stop:      Procedure:  ATTEMPTED LEFT UPPER EXTREMITY ARTERIOVENOUS GRAFT FORMATION (Left Arm Upper) Diagnosis:       End stage kidney disease (CMS/HCC)      (End stage kidney disease (CMS/HCC) [N18.6])    Surgeon:  Dimitri Jacobo MD Provider:  Solitario Pagan CRNA    Anesthesia Type:  MAC ASA Status:  4          Anesthesia Type: MAC  Last vitals  BP   107/63 (03/07/19 0548)   Temp   97.6 °F (36.4 °C) (03/07/19 0548)   Pulse   66 (03/07/19 0725)   Resp   16 (03/07/19 0548)     SpO2   99 % (03/07/19 0725)     Post Anesthesia Care and Evaluation    Patient location during evaluation: ICU  Patient participation: complete - patient cannot participate  Level of consciousness: responsive to physical stimuli  Pain management: adequate  Airway patency: patent  PONV Status: none  Cardiovascular status: hemodynamically unstable  Respiratory status: intubated, ETT and ventilator  Hydration status: acceptable

## 2021-11-11 NOTE — ASSESSMENT & PLAN NOTE
Non-ischemic dilated cardiomyopathy, NYHA 3.  Unable to tolerate any medical therapies due to symptomatic hypotension.  Volume status being better managed now with dialysis.  Continue close assessment, and I did advise the patient and her family that, should her blood pressure start to rise a little bit such that she could tolerate reintroduction of any of her previous heart failure medications, to please contact me we would do so.  Otherwise, will reassess in 6 months.   Statement Selected

## (undated) DEVICE — ENDOPATH PNEUMONEEDLE INSUFFLATION NEEDLES WITH LUER LOCK CONNECTORS 120MM: Brand: ENDOPATH

## (undated) DEVICE — CVR PROB GEN PURP W ISOSILK 6X48

## (undated) DEVICE — PROXIMATE RH ROTATING HEAD SKIN STAPLERS (35 REGULAR) CONTAINS 35 STAINLESS STEEL STAPLES: Brand: PROXIMATE

## (undated) DEVICE — DRSNG SURESITE WNDW 4X4.5

## (undated) DEVICE — PAD LAP CHOLE: Brand: MEDLINE INDUSTRIES, INC.

## (undated) DEVICE — INTENDED FOR TISSUE SEPARATION, AND OTHER PROCEDURES THAT REQUIRE A SHARP SURGICAL BLADE TO PUNCTURE OR CUT.: Brand: BARD-PARKER ® STAINLESS STEEL BLADES

## (undated) DEVICE — GLV SURG SENSICARE W/ALOE PF LF 8 STRL

## (undated) DEVICE — BIOPATCH™ ANTIMICROBIAL DRESSING WITH CHLORHEXIDINE GLUCONATE IS A HYDROPHILLIC POLYURETHANE ABSORPTIVE FOAM WITH CHLORHEXIDINE GLUCONATE (CHG) WHICH INHIBITS BACTERIAL GROWTH UNDER THE DRESSING. THE DRESSING IS INTENDED TO BE USED TO ABSORB EXUDATE, COVER A WOUND CAUSED BY VASCULAR AND NONVASCULAR PERCUTANEOUS MEDICAL DEVICES DURING SURGERY, AS WELL AS REDUCE LOCAL INFECTION AND COLONIZATION OF MICROORGANISMS.: Brand: BIOPATCH

## (undated) DEVICE — PAD MAJOR VASCULAR: Brand: MEDLINE INDUSTRIES, INC.

## (undated) DEVICE — ELECTRD PAD DEFIB A/

## (undated) DEVICE — SUT VIC 0 UR6 27IN VCP603H

## (undated) DEVICE — SCANLAN® VASCU-STATT® II SINGLE-USE BULLDOG CLAMP W/FIRMER CLAMPING PRESS - MAXI ANGLED 45°(ORANGE), CLAMPING PRESSURE 165-175 G (2/STERILE PKG): Brand: SCANLAN® VASCU-STATT® II SINGLE-USE BULLDOG CLAMP W/FIRMER CLAMPING PRESS

## (undated) DEVICE — SYR LUERLOK 20CC

## (undated) DEVICE — SPNG GZ 2S 2X2 8PLY STRL PK/2

## (undated) DEVICE — SUT PROLN 7/0 BV1 24IN 4PK M8702

## (undated) DEVICE — SUT MNCRYL 4/0 PS2 27IN UD MCP426H

## (undated) DEVICE — SYR SLP TP 10ML DISP

## (undated) DEVICE — PAD MINOR UNIVERSAL: Brand: MEDLINE INDUSTRIES, INC.

## (undated) DEVICE — STPCK 3/WY HP M/RA W/OFF/HNDL 1050PSI STRL

## (undated) DEVICE — KT NDL GUIDE STRL 18GA

## (undated) DEVICE — GLV SURG SENSICARE W/ALOE PF LF 7.5 STRL

## (undated) DEVICE — Device

## (undated) DEVICE — ENDOPATH XCEL WITH OPTIVIEW TECHNOLOGY DILATING TIP TROCARS WITH STABILITY SLEEVES: Brand: ENDOPATH XCEL OPTIVIEW

## (undated) DEVICE — EQUISTREAM XK HEMODIALYSIS CATH W/STYLET, ST, AIRGUARD, 16 FR. 23CM: Brand: EQUISTREAM XK LONG-TERM HEMODIALYSIS CATHETER WITH PRELOADED STYLET

## (undated) DEVICE — RESERVOIR,SUCTION,100CC,SILICONE: Brand: MEDLINE

## (undated) DEVICE — APPL CHLORAPREP W/TINT 26ML ORNG

## (undated) DEVICE — SNAP KOVER: Brand: UNBRANDED

## (undated) DEVICE — SOLIDIFIER LIQUI LOC PLUS 2000CC

## (undated) DEVICE — PK TURNOVER RM ADV

## (undated) DEVICE — ENDOPATH XCEL DILATING TIP TROCARS WITH STABILITY SLEEVES: Brand: ENDOPATH XCEL

## (undated) DEVICE — SLV REPOSTNG W CATHLK HUB 60CM

## (undated) DEVICE — ENDOPOUCH RETRIEVER SPECIMEN RETRIEVAL BAGS: Brand: ENDOPOUCH RETRIEVER

## (undated) DEVICE — PROXIMATE RH ROTATING HEAD SKIN STAPLERS (35 WIDE) CONTAINS 35 STAINLESS STEEL STAPLES: Brand: PROXIMATE

## (undated) DEVICE — CANN VESL ACRN TP 4MM

## (undated) DEVICE — 3M™ STERI-STRIP™ REINFORCED ADHESIVE SKIN CLOSURES, R1547, 1/2 IN X 4 IN (12 MM X 100 MM), 6 STRIPS/ENVELOPE: Brand: 3M™ STERI-STRIP™

## (undated) DEVICE — 3M™ STERI-STRIP™ REINFORCED ADHESIVE SKIN CLOSURES, R1546, 1/4 IN X 4 IN (6 MM X 100 MM), 10 STRIPS/ENVELOPE: Brand: 3M™ STERI-STRIP™

## (undated) DEVICE — CANN CO2/O2 NASL A/

## (undated) DEVICE — ENDOPATH XCEL WITH OPTIVIEW TECHNOLOGY UNIVERSAL TROCAR STABILITY SLEEVES: Brand: ENDOPATH XCEL OPTIVIEW

## (undated) DEVICE — GLV SURG BIOGEL M LTX PF 7 1/2

## (undated) DEVICE — ST MIC/INTRO ACC SHRP/NDL TUNG/TP NITNL 5F 45CM 7CM

## (undated) DEVICE — LP VESL MINI BLU PK/2

## (undated) DEVICE — GEL ULTRASND HI VISC 20G PACKET STRL

## (undated) DEVICE — NDL HYPO PRECISIONGLIDE REG 25G 1 1/2

## (undated) DEVICE — SUT ETHLN 2/0 FS 18IN 664H

## (undated) DEVICE — LAPAROSCOPIC MONOPOLAR CORD: Brand: VALLEYLAB

## (undated) DEVICE — SOL IRR NACL 0.9PCT BT 1000ML

## (undated) DEVICE — PAD GRND REM POLYHESIVE A/ DISP

## (undated) DEVICE — CATH CHOLANG MIXTR ENDO 35G 5F 50CM

## (undated) DEVICE — 2, DISPOSABLE SUCTION/IRRIGATOR WITHOUT DISPOSABLE TIP: Brand: STRYKEFLOW

## (undated) DEVICE — DEV STBL SHEATH STATLOCK PSI

## (undated) DEVICE — PK CATH CARD 30

## (undated) DEVICE — MONOPOLAR METZENBAUM SCISSOR, MINI BLADE TIP, DISPOSABLE: Brand: MONOPOLAR METZENBAUM SCISSOR, MINI BLADE TIP, DISPOSABLE

## (undated) DEVICE — SWAN-GANZ POLYMER BLEND TRUE SIZE C-TIP CONTROLCATH TD: Brand: SWAN-GANZ CONTROLCATH TRUE SIZE

## (undated) DEVICE — TOWEL,OR,DSP,ST,BLUE,STD,4/PK,20PK/CS: Brand: MEDLINE

## (undated) DEVICE — GLV SURG TRIUMPH GREEN W/ALOE PF LTX 8 STRL

## (undated) DEVICE — SPNG GZ STRL 2S 4X4 12PLY

## (undated) DEVICE — SYR CONTRL LUERLOK 10CC

## (undated) DEVICE — BNDG ADHS CURAD FLX/FABRC 1X3IN STRL LF

## (undated) DEVICE — WIPE MEROCEL 3.625X3IN

## (undated) DEVICE — SUT PROLN 6/0 4/24IN BV1 MON BL M8805

## (undated) DEVICE — ANTIBACTERIAL UNDYED BRAIDED (POLYGLACTIN 910), SYNTHETIC ABSORBABLE SUTURE: Brand: COATED VICRYL

## (undated) DEVICE — Device: Brand: MEDEX

## (undated) DEVICE — SYR PRECISIONGLIDE LL 5CC 20X1 1/2IN